# Patient Record
Sex: FEMALE | Race: WHITE | NOT HISPANIC OR LATINO | Employment: FULL TIME | URBAN - METROPOLITAN AREA
[De-identification: names, ages, dates, MRNs, and addresses within clinical notes are randomized per-mention and may not be internally consistent; named-entity substitution may affect disease eponyms.]

---

## 2017-05-04 ENCOUNTER — OFFICE VISIT (OUTPATIENT)
Dept: OBSTETRICS AND GYNECOLOGY | Facility: CLINIC | Age: 37
End: 2017-05-04
Payer: COMMERCIAL

## 2017-05-04 VITALS
BODY MASS INDEX: 19.49 KG/M2 | HEIGHT: 66 IN | DIASTOLIC BLOOD PRESSURE: 70 MMHG | SYSTOLIC BLOOD PRESSURE: 118 MMHG | WEIGHT: 121.25 LBS

## 2017-05-04 DIAGNOSIS — E28.39 PREMATURE OVARIAN FAILURE: ICD-10-CM

## 2017-05-04 DIAGNOSIS — Z01.419 WELL WOMAN EXAM WITH ROUTINE GYNECOLOGICAL EXAM: Primary | ICD-10-CM

## 2017-05-04 PROCEDURE — 99999 PR PBB SHADOW E&M-EST. PATIENT-LVL II: CPT | Mod: PBBFAC,,, | Performed by: OBSTETRICS & GYNECOLOGY

## 2017-05-04 PROCEDURE — 99385 PREV VISIT NEW AGE 18-39: CPT | Mod: S$GLB,,, | Performed by: OBSTETRICS & GYNECOLOGY

## 2017-05-04 RX ORDER — MEDROXYPROGESTERONE ACETATE 10 MG/1
10 TABLET ORAL DAILY
Qty: 14 TABLET | Refills: 11 | Status: SHIPPED | OUTPATIENT
Start: 2017-05-04 | End: 2017-05-15 | Stop reason: CLARIF

## 2017-05-04 RX ORDER — ESTRADIOL 0.1 MG/D
FILM, EXTENDED RELEASE TRANSDERMAL
Refills: 2 | COMMUNITY
Start: 2017-04-25 | End: 2017-05-04 | Stop reason: SDUPTHER

## 2017-05-04 RX ORDER — ESTRADIOL 0.1 MG/D
1 FILM, EXTENDED RELEASE TRANSDERMAL
Qty: 8 PATCH | Refills: 11 | Status: SHIPPED | OUTPATIENT
Start: 2017-05-04 | End: 2018-05-29 | Stop reason: SDUPTHER

## 2017-05-04 NOTE — MR AVS SNAPSHOT
"    [facility] [address]  [address] [facility]  [address]  [facility] [address]  Phone: [phone]  Fax: [phone]                  Ashli Michaels   2017 3:00 PM   Office Visit    Description:  Female : 1980   Provider:  Shanon Romo MD   Department:  [facility] [address]           Reason for Visit     Well Woman                To Do List           Goals (5 Years of Data)     None      [facility]     [facility] -  Assistance  Unless otherwise directed by your provider, please contact [facility], our nurse care line that is available for  assistance.     Registered nurses in the [facility] provide: appointment scheduling, clinical advisement, health education, and other advisory services.  Call: [phone] (toll free)               Medications           Message regarding Medications     Verify the changes and/or additions to your medication regime listed below are the same as discussed with your clinician today.  If any of these changes or additions are incorrect, please notify your healthcare provider.        STOP taking these medications     estradiol (VIVELLE-DOT) 0.025 mg/24 hr Place 1 patch onto the skin twice a week.           Verify that the below list of medications is an accurate representation of the medications you are currently taking.  If none reported, the list may be blank. If incorrect, please contact your healthcare provider. Carry this list with you in case of emergency.           Current Medications     estradiol (VIVELLE-DOT) 0.1 mg/24 hr PTSW APLY ONE PATCH TOPICALLY TWICE A WEEK           Clinical Reference Information           Your Vitals Were     BP Height Weight Last Period BMI    118/70 5' 6" (1.676 m) 55 kg (121 lb 4.1 oz) 2017 19.57 kg/m2      Blood Pressure          Most Recent Value    BP  118/70      Allergies as of 2017     No Known Allergies      Immunizations Administered on Date of " Encounter - 5/4/2017     None      Language Assistance Services     ATTENTION: Language assistance services are available, free of charge. Please call [phone].      ATENCIÓN: Si habla bernie, tiene a mckinnon disposición servicios gratuitos de asistencia lingüística. Llame al [phone].     CHÚ Ý: N?u b?n nói Ti?ng Vi?t, có các d?ch v? h? tr? ngôn ng? mi?n phí dành cho b?n. G?i s? [phone].         [facility] [address] complies with applicable Federal civil rights laws and does not discriminate on the basis of race, color, national origin, age, disability, or sex.

## 2017-05-04 NOTE — PROGRESS NOTES
History & Physical  Gynecology      SUBJECTIVE:     Chief Complaint: Well Woman       History of Present Illness:  Annual Exam-Premenopausal  Patient presents for annual exam. The patient has no complaints today. Menstrual cycles are once a month with hormones- patient has POF and is taking estogen and progesterone.  The patient is sexually active. GYN screening history: last pap: approximate date  and was normal. The patient participates in regular exercise: yes.  The patient does not smoke.      Contraception: none    FH:  Breast cancer: none  Colon cancer: grandmother (death at 66)and great grandmother  Ovarian cancer: none    Review of patient's allergies indicates:  No Known Allergies    Past Medical History:   Diagnosis Date    Premature ovarian failure      No past surgical history on file.  OB History      Para Term  AB TAB SAB Ectopic Multiple Living    0 0 0 0 0 0 0 0 0 0        Family History   Problem Relation Age of Onset    Colon cancer Neg Hx     Hypertension Neg Hx      labor Neg Hx      Social History   Substance Use Topics    Smoking status: Never Smoker    Smokeless tobacco: None    Alcohol use Yes       Current Outpatient Prescriptions   Medication Sig    estradiol (VIVELLE-DOT) 0.1 mg/24 hr PTSW Place 1 patch onto the skin twice a week.    medroxyPROGESTERone (PROVERA) 10 MG tablet Take 1 tablet (10 mg total) by mouth once daily.     No current facility-administered medications for this visit.          Review of Systems:  Review of Systems   Constitutional: Negative for activity change, appetite change and fever.   Respiratory: Negative for shortness of breath.    Cardiovascular: Negative for chest pain.   Gastrointestinal: Negative for abdominal pain, constipation, diarrhea, nausea and vomiting.   Genitourinary: Negative for menorrhagia, menstrual problem, pelvic pain, vaginal bleeding, vaginal discharge and vaginal pain.   Neurological: Negative for numbness  and headaches.   Breast: Negative for breast pain and nipple discharge       OBJECTIVE:     Physical Exam:  Physical Exam   Constitutional: She is oriented to person, place, and time. She appears well-developed and well-nourished.   Neck: Normal range of motion. Neck supple. No tracheal deviation present. No thyromegaly present.   Cardiovascular: Normal rate, regular rhythm and normal heart sounds.    Pulmonary/Chest: Effort normal and breath sounds normal. Right breast exhibits no inverted nipple, no mass, no nipple discharge, no skin change and no tenderness. Left breast exhibits no inverted nipple, no mass, no nipple discharge, no skin change and no tenderness. Breasts are symmetrical.   Abdominal: Soft.   Genitourinary: Vagina normal and uterus normal. No labial fusion. There is no rash, tenderness, lesion or injury on the right labia. There is no rash, tenderness, lesion or injury on the left labia. Uterus is not deviated, not enlarged, not fixed and not tender. Cervix exhibits no motion tenderness, no discharge and no friability. Right adnexum displays no mass, no tenderness and no fullness. Left adnexum displays no mass, no tenderness and no fullness. No erythema, tenderness or bleeding in the vagina. No foreign body in the vagina. No signs of injury around the vagina. No vaginal discharge found.   Neurological: She is alert and oriented to person, place, and time.   Psychiatric: She has a normal mood and affect. Her behavior is normal. Judgment and thought content normal.   Nursing note and vitals reviewed.        ASSESSMENT:       ICD-10-CM ICD-9-CM    1. Well woman exam with routine gynecological exam Z01.419 V72.31    2. Premature ovarian failure E28.8 256.8 estradiol (VIVELLE-DOT) 0.1 mg/24 hr PTSW      medroxyPROGESTERone (PROVERA) 10 MG tablet          Plan:      Ashli was seen today for well woman.    Diagnoses and all orders for this visit:    Well woman exam with routine gynecological  exam    Premature ovarian failure  -     estradiol (VIVELLE-DOT) 0.1 mg/24 hr PTSW; Place 1 patch onto the skin twice a week.  -     medroxyPROGESTERone (PROVERA) 10 MG tablet; Take 1 tablet (10 mg total) by mouth once daily.        No orders of the defined types were placed in this encounter.      Well Woman:  - Pap smear due one year  - Birth control: patient declines; estradiol and provera refilled  - GC/CT:not required  - Mammogram: due age 39  - Smoking cessation: n/a  - Labs: none required   - Vaccines: none required  - Calcium and Vitamin D counseling; Exercise counseling    POF:  - referral to [facility] for fertility counseling    Return in about 1 year (around 5/4/2018) for annual or prn.    Shanon Romo

## 2017-05-08 ENCOUNTER — PATIENT MESSAGE (OUTPATIENT)
Dept: OBSTETRICS AND GYNECOLOGY | Facility: CLINIC | Age: 37
End: 2017-05-08

## 2017-05-14 ENCOUNTER — PATIENT MESSAGE (OUTPATIENT)
Dept: OBSTETRICS AND GYNECOLOGY | Facility: CLINIC | Age: 37
End: 2017-05-14

## 2017-05-14 DIAGNOSIS — E28.39 PREMATURE OVARIAN FAILURE: Primary | ICD-10-CM

## 2017-05-15 ENCOUNTER — TELEPHONE (OUTPATIENT)
Dept: OBSTETRICS AND GYNECOLOGY | Facility: CLINIC | Age: 37
End: 2017-05-15

## 2017-05-15 NOTE — TELEPHONE ENCOUNTER
Called and scheduled patient for her mammogram for Friday morning. Patient verbalized understanding.

## 2017-05-16 RX ORDER — PROGESTERONE 200 MG/1
200 CAPSULE ORAL NIGHTLY
Qty: 12 CAPSULE | Refills: 11 | Status: SHIPPED | OUTPATIENT
Start: 2017-05-16 | End: 2019-01-17

## 2017-05-19 ENCOUNTER — HOSPITAL ENCOUNTER (OUTPATIENT)
Dept: RADIOLOGY | Facility: OTHER | Age: 37
Discharge: HOME | End: 2017-05-19
Attending: OBSTETRICS & GYNECOLOGY
Payer: COMMERCIAL

## 2017-05-19 DIAGNOSIS — E28.39 PREMATURE OVARIAN FAILURE: ICD-10-CM

## 2017-05-19 DIAGNOSIS — Z12.31 VISIT FOR SCREENING MAMMOGRAM: ICD-10-CM

## 2017-05-19 PROCEDURE — 77067 SCR MAMMO BI INCL CAD: CPT | Mod: 26,,, | Performed by: RADIOLOGY

## 2017-05-19 PROCEDURE — 77067 SCR MAMMO BI INCL CAD: CPT | Mod: TC

## 2017-05-19 PROCEDURE — 77063 BREAST TOMOSYNTHESIS BI: CPT | Mod: 26,,, | Performed by: RADIOLOGY

## 2018-02-15 ENCOUNTER — OFFICE VISIT (OUTPATIENT)
Dept: URGENT CARE | Facility: CLINIC | Age: 38
End: 2018-02-15
Payer: COMMERCIAL

## 2018-02-15 VITALS
TEMPERATURE: 97 F | WEIGHT: 125 LBS | HEIGHT: 66 IN | SYSTOLIC BLOOD PRESSURE: 123 MMHG | BODY MASS INDEX: 20.09 KG/M2 | HEART RATE: 81 BPM | OXYGEN SATURATION: 98 % | DIASTOLIC BLOOD PRESSURE: 72 MMHG | RESPIRATION RATE: 19 BRPM

## 2018-02-15 DIAGNOSIS — J06.9 UPPER RESPIRATORY TRACT INFECTION, UNSPECIFIED TYPE: Primary | ICD-10-CM

## 2018-02-15 PROCEDURE — 3008F BODY MASS INDEX DOCD: CPT | Mod: S$GLB,,, | Performed by: FAMILY MEDICINE

## 2018-02-15 PROCEDURE — 99214 OFFICE O/P EST MOD 30 MIN: CPT | Mod: S$GLB,,, | Performed by: FAMILY MEDICINE

## 2018-02-15 RX ORDER — BENZONATATE 100 MG/1
100 CAPSULE ORAL EVERY 6 HOURS PRN
Qty: 30 CAPSULE | Refills: 1 | Status: SHIPPED | OUTPATIENT
Start: 2018-02-15 | End: 2019-01-17

## 2018-02-15 RX ORDER — CODEINE PHOSPHATE AND GUAIFENESIN 10; 100 MG/5ML; MG/5ML
5 SOLUTION ORAL 3 TIMES DAILY PRN
Qty: 60 ML | Refills: 0 | Status: SHIPPED | OUTPATIENT
Start: 2018-02-15 | End: 2018-02-25

## 2018-02-15 NOTE — PROGRESS NOTES
"Subjective:       Patient ID: Ashli Michaels is a 37 y.o. female.    Vitals:  height is 5' 6" (1.676 m) and weight is 56.7 kg (125 lb). Her tympanic temperature is 97.1 °F (36.2 °C). Her blood pressure is 123/72 and her pulse is 81. Her respiration is 19 and oxygen saturation is 98%.     Chief Complaint: Cough    Patient with cough x 5 days. Cough started on Saturday. Patient stating she developed a lot of chest congestion. Patient with difficulty breathing. Cough has not been productive until today.       Cough   This is a new problem. Episode onset: 5 days. The problem has been unchanged. The problem occurs every few minutes. Associated symptoms include headaches and shortness of breath. Pertinent negatives include no chest pain, chills, ear pain, eye redness, fever, myalgias, sore throat or wheezing. The symptoms are aggravated by lying down. Treatments tried: Mucinex/Nyquil. The treatment provided moderate relief.     Review of Systems   Constitution: Positive for malaise/fatigue. Negative for chills and fever.   HENT: Positive for hoarse voice. Negative for congestion, ear pain and sore throat.    Eyes: Negative for discharge and redness.   Cardiovascular: Negative for chest pain, dyspnea on exertion and leg swelling.   Respiratory: Positive for cough and shortness of breath. Negative for sputum production and wheezing.    Musculoskeletal: Negative for myalgias.   Gastrointestinal: Negative for abdominal pain and nausea.   Neurological: Positive for headaches.       Objective:      Physical Exam   Constitutional: She appears well-developed and well-nourished.   HENT:   Head: Normocephalic and atraumatic.   Mouth/Throat: Posterior oropharyngeal erythema present. No oropharyngeal exudate.   Eyes: EOM are normal. Pupils are equal, round, and reactive to light.   Neck: Normal range of motion. Neck supple.   Cardiovascular: Normal rate, regular rhythm and normal heart sounds.    Pulmonary/Chest: Effort normal and " breath sounds normal.   Nursing note and vitals reviewed.      Assessment:       1. Upper respiratory tract infection, unspecified type        Plan:         Upper respiratory tract infection, unspecified type  -     benzonatate (TESSALON PERLES) 100 MG capsule; Take 1 capsule (100 mg total) by mouth every 6 (six) hours as needed for Cough.  Dispense: 30 capsule; Refill: 1  -     guaifenesin-codeine 100-10 mg/5 ml (CHERATUSSIN AC)  mg/5 mL syrup; Take 5 mLs by mouth 3 (three) times daily as needed for Cough (MAINLY FOR NIGHT-TIME USE).  Dispense: 60 mL; Refill: 0

## 2018-02-15 NOTE — PATIENT INSTRUCTIONS
Viral Upper Respiratory Illness (Adult)  You have a viral upper respiratory illness (URI), which is another term for the common cold. This illness is contagious during the first few days. It is spread through the air by coughing and sneezing. It may also be spread by direct contact (touching the sick person and then touching your own eyes, nose, or mouth). Frequent handwashing will decrease risk of spread. Most viral illnesses go away within 7 to 10 days with rest and simple home remedies. Sometimes the illness may last for several weeks. Antibiotics will not kill a virus, and they are generally not prescribed for this condition.    Home care  · If symptoms are severe, rest at home for the first 2 to 3 days. When you resume activity, don't let yourself get too tired.  · Avoid being exposed to cigarette smoke (yours or others).  · You may use acetaminophen or ibuprofen to control pain and fever, unless another medicine was prescribed. (Note: If you have chronic liver or kidney disease, have ever had a stomach ulcer or gastrointestinal bleeding, or are taking blood-thinning medicines, talk with your healthcare provider before using these medicines.) Aspirin should never be given to anyone under 18 years of age who is ill with a viral infection or fever. It may cause severe liver or brain damage.  · Your appetite may be poor, so a light diet is fine. Avoid dehydration by drinking 6 to 8 glasses of fluids per day (water, soft drinks, juices, tea, or soup). Extra fluids will help loosen secretions in the nose and lungs.  · Over-the-counter cold medicines will not shorten the length of time youre sick, but they may be helpful for the following symptoms: cough, sore throat, and nasal and sinus congestion. (Note: Do not use decongestants if you have high blood pressure.)  Follow-up care  Follow up with your healthcare provider, or as advised.  When to seek medical advice  Call your healthcare provider right away if any  of these occur:  · Cough with lots of colored sputum (mucus)  · Severe headache; face, neck, or ear pain  · Difficulty swallowing due to throat pain  · Fever of 100.4°F (38°C)  Call 911, or get immediate medical care  Call emergency services right away if any of these occur:  · Chest pain, shortness of breath, wheezing, or difficulty breathing  · Coughing up blood  · Inability to swallow due to throat pain  Date Last Reviewed: 9/13/2015  © 2820-6649 [employer]. [address] [place] [address]. All rights reserved. This information is not intended as a substitute for professional medical care. Always follow your healthcare professional's instructions.

## 2018-05-29 ENCOUNTER — PATIENT MESSAGE (OUTPATIENT)
Dept: OBSTETRICS AND GYNECOLOGY | Facility: CLINIC | Age: 38
End: 2018-05-29

## 2018-05-29 DIAGNOSIS — E28.39 PREMATURE OVARIAN FAILURE: ICD-10-CM

## 2018-05-29 NOTE — TELEPHONE ENCOUNTER
----- Message from Aliyah Caballero sent at 5/29/2018  4:25 PM CDT -----  Contact: marcella  from [facility]   Pt is requesting a refill on the prescription estradiol (VIVELLE-DOT). Pt can be reached at [phone]. Marcella from [facility] can be reached at [phone].

## 2018-05-30 RX ORDER — ESTRADIOL 0.1 MG/D
1 FILM, EXTENDED RELEASE TRANSDERMAL
Qty: 8 PATCH | Refills: 0 | Status: SHIPPED | OUTPATIENT
Start: 2018-05-31 | End: 2019-01-17

## 2018-11-14 DIAGNOSIS — E28.39 PREMATURE OVARIAN INSUFFICIENCY: Primary | ICD-10-CM

## 2018-11-14 DIAGNOSIS — Z00.00 ROUTINE HEALTH MAINTENANCE: ICD-10-CM

## 2018-12-06 ENCOUNTER — TELEPHONE (OUTPATIENT)
Dept: OBSTETRICS AND GYNECOLOGY | Facility: CLINIC | Age: 38
End: 2018-12-06

## 2018-12-06 DIAGNOSIS — Z30.9 ENCOUNTER FOR CONTRACEPTIVE MANAGEMENT, UNSPECIFIED TYPE: Primary | ICD-10-CM

## 2018-12-06 NOTE — TELEPHONE ENCOUNTER
----- Message from Shanon Romo MD sent at 12/5/2018  9:57 PM CST -----  Hey,   This patient is coming in January and it looks like the fertility doctor wants her to have a mirena placed.  Would you call her and find out if this is what she wants so that we can get paperwork done/mirena ordered before her appointment?  Thanks!  Kiley    ----- Message -----  From: Lindsay Calloway MD  Sent: 11/14/2018   2:40 PM  To: Shanon Romo MD    Hi Shanon, I saw one of our patients today. She is 38 with POI. She reported struggling with HRT because she forgets to take her P4 and then ends up with irregular bleeding. I advised her to consider a Mirena with continuous vivelle patches. Her current dose is 0.1mg/day. She is seeing you in January. Would you be comfortable placing it for her? I also ordered her mammogram and a baseline DEXA.   Thanks!  Lindsay

## 2018-12-12 ENCOUNTER — TELEPHONE (OUTPATIENT)
Dept: PHARMACY | Facility: CLINIC | Age: 38
End: 2018-12-12

## 2018-12-12 NOTE — TELEPHONE ENCOUNTER
LVM to notify pt MIRENA is covered with $0.00 copay on pharmacy benefit. Need to offer pharmacist consultation and obtain consent to deliver to [facility]. Will follow up if no return call is received.

## 2018-12-17 NOTE — TELEPHONE ENCOUNTER
Pt returned call regarding IUD. HIPAA verified. Explained that she has a $0.00 copay for MIRENA on her pharmacy benefit but she may have a copay for her office visit. She declined pharmacist consultation. She has an appointment 01/10/18 at 8:30 am for IUD placement. Will reach out to staff to confirm delivery with [facility].

## 2019-01-17 ENCOUNTER — OFFICE VISIT (OUTPATIENT)
Dept: OBSTETRICS AND GYNECOLOGY | Facility: CLINIC | Age: 39
End: 2019-01-17
Payer: COMMERCIAL

## 2019-01-17 ENCOUNTER — PATIENT MESSAGE (OUTPATIENT)
Dept: OBSTETRICS AND GYNECOLOGY | Facility: CLINIC | Age: 39
End: 2019-01-17

## 2019-01-17 VITALS
DIASTOLIC BLOOD PRESSURE: 80 MMHG | SYSTOLIC BLOOD PRESSURE: 120 MMHG | BODY MASS INDEX: 21.11 KG/M2 | WEIGHT: 131.38 LBS | HEIGHT: 66 IN

## 2019-01-17 DIAGNOSIS — Z01.419 WELL WOMAN EXAM WITH ROUTINE GYNECOLOGICAL EXAM: Primary | ICD-10-CM

## 2019-01-17 DIAGNOSIS — E28.39 PREMATURE OVARIAN FAILURE: ICD-10-CM

## 2019-01-17 PROCEDURE — 58300 INSERTION OF IUD: ICD-10-PCS | Mod: S$GLB,,, | Performed by: OBSTETRICS & GYNECOLOGY

## 2019-01-17 PROCEDURE — 87624 HPV HI-RISK TYP POOLED RSLT: CPT

## 2019-01-17 PROCEDURE — 88175 CYTOPATH C/V AUTO FLUID REDO: CPT

## 2019-01-17 PROCEDURE — 99395 PR PREVENTIVE VISIT,EST,18-39: ICD-10-PCS | Mod: 25,S$GLB,, | Performed by: OBSTETRICS & GYNECOLOGY

## 2019-01-17 PROCEDURE — 99395 PREV VISIT EST AGE 18-39: CPT | Mod: 25,S$GLB,, | Performed by: OBSTETRICS & GYNECOLOGY

## 2019-01-17 PROCEDURE — 58300 INSERT INTRAUTERINE DEVICE: CPT | Mod: S$GLB,,, | Performed by: OBSTETRICS & GYNECOLOGY

## 2019-01-17 RX ORDER — ESTRADIOL 0.1 MG/D
1 FILM, EXTENDED RELEASE TRANSDERMAL
Qty: 8 PATCH | Refills: 11 | Status: SHIPPED | OUTPATIENT
Start: 2019-01-17 | End: 2020-01-28 | Stop reason: SDUPTHER

## 2019-01-17 NOTE — PROCEDURES
Insertion of IUD  Date/Time: 1/17/2019 11:09 AM  Performed by: Shanon Romo MD  Authorized by: Shanon Romo MD     Consent:     Consent obtained:  Written    Consent given by:  Patient    Procedure risks and benefits discussed: yes      Patient questions answered: yes      Patient agrees, verbalizes understanding, and wants to proceed: yes      Educational handouts given: yes      Instructions and paperwork completed: yes    Procedure:     Pelvic exam performed: yes      Negative urine pregnancy test: yes      Cervix cleaned and prepped: yes      Speculum placed in vagina: yes      Tenaculum applied to cervix: yes      Uterus sounded: yes      Uterus sound depth (cm):  6    IUD inserted with no complications: yes      IUD type:  Mirena    Strings trimmed: yes    Post-procedure:     Patient tolerated procedure well: yes      Patient will follow up after next period: yes

## 2019-01-17 NOTE — PROGRESS NOTES
History & Physical  Gynecology      SUBJECTIVE:     Chief Complaint: Well Woman       History of Present Illness:  Annual Exam-Premenopausal  Patient presents for annual exam. The patient has no complaints today. Menstrual cycles are absent- premature ovarian failure.  The patient is sexually active. GYN screening history: last pap: approximate date  and was normal. The patient participates in regular exercise: yes.  The patient does not smoke.      Contraception: none required    FH:  Breast cancer: none  Colon cancer: grandmother/great grandmother  Ovarian cancer: none    Review of patient's allergies indicates:   Allergen Reactions    Erythromycin Other (See Comments)     As a child; hives       Past Medical History:   Diagnosis Date    Premature ovarian failure      History reviewed. No pertinent surgical history.  OB History      Para Term  AB Living    0 0 0 0 0 0    SAB TAB Ectopic Multiple Live Births    0 0 0 0          Family History   Problem Relation Age of Onset    Colon cancer Neg Hx     Hypertension Neg Hx      labor Neg Hx      Social History     Tobacco Use    Smoking status: Never Smoker    Smokeless tobacco: Never Used   Substance Use Topics    Alcohol use: Yes    Drug use: No       Current Outpatient Medications   Medication Sig    estradiol (VIVELLE-DOT) 0.1 mg/24 hr PTSW Place 1 patch onto the skin twice a week.    levonorgestrel (MIRENA) 20 mcg/24 hr (5 years) IUD 1 Intra Uterine Device by Intrauterine route once. for 1 dose     No current facility-administered medications for this visit.          Review of Systems:  Review of Systems   Constitutional: Negative for activity change, appetite change and fever.   Respiratory: Negative for shortness of breath.    Cardiovascular: Negative for chest pain.   Gastrointestinal: Negative for abdominal pain, constipation, diarrhea, nausea and vomiting.   Genitourinary: Negative for menorrhagia, menstrual problem,  pelvic pain, vaginal bleeding, vaginal discharge and vaginal pain.   Neurological: Negative for numbness and headaches.   Breast: Negative for mastodynia and nipple discharge       OBJECTIVE:     Physical Exam:  Physical Exam   Constitutional: She is oriented to person, place, and time. She appears well-developed and well-nourished.   Neck: Normal range of motion. Neck supple. No tracheal deviation present. No thyromegaly present.   Cardiovascular: Normal rate, regular rhythm and normal heart sounds.   Pulmonary/Chest: Effort normal and breath sounds normal. Right breast exhibits no inverted nipple, no mass, no nipple discharge, no skin change and no tenderness. Left breast exhibits no inverted nipple, no mass, no nipple discharge, no skin change and no tenderness. Breasts are symmetrical.   Abdominal: Soft.   Genitourinary: Vagina normal and uterus normal. No labial fusion. There is no rash, tenderness, lesion or injury on the right labia. There is no rash, tenderness, lesion or injury on the left labia. Uterus is not deviated, not enlarged, not fixed and not tender. Cervix exhibits no motion tenderness, no discharge and no friability. Right adnexum displays no mass, no tenderness and no fullness. Left adnexum displays no mass, no tenderness and no fullness. No erythema, tenderness or bleeding in the vagina. No foreign body in the vagina. No signs of injury around the vagina. No vaginal discharge found.   Genitourinary Comments: Urethra: normal appearing urethra with no masses, tenderness or lesions  Urethral meatus: normal size, anterior vaginal wall with no prolapse, no lesions   Neurological: She is alert and oriented to person, place, and time.   Psychiatric: She has a normal mood and affect. Her behavior is normal. Judgment and thought content normal.   Nursing note and vitals reviewed.      Chaperoned by: Samia    ASSESSMENT:       ICD-10-CM ICD-9-CM    1. Well woman exam with routine gynecological exam  Z01.419 V72.31 Liquid-based pap smear, screening      HPV High Risk Genotypes, PCR      Mammo Digital Screening Bilat w/ Bernabe   2. Premature ovarian failure E28.8 256.8 estradiol (VIVELLE-DOT) 0.1 mg/24 hr PTSW          Plan:      Ashli was seen today for well woman.    Diagnoses and all orders for this visit:    Well woman exam with routine gynecological exam  -     Liquid-based pap smear, screening  -     HPV High Risk Genotypes, PCR  -     Mammo Digital Screening Bilat w/ Bernabe; Future    Premature ovarian failure  -     estradiol (VIVELLE-DOT) 0.1 mg/24 hr PTSW; Place 1 patch onto the skin twice a week.        Orders Placed This Encounter   Procedures    HPV High Risk Genotypes, PCR    Mammo Digital Screening Bilat w/ Bernabe       Well Woman:  - Pap smear done today with hpv  - Birth control: none required, POI; mirena placed today and vivelle dot refilled  - GC/CT:n/a  - Mammogram: ordered for March  - Smoking cessation: n/a  - Labs: none required   - Vaccines: none required  - Exercise counseling    POI:  - mirena today, refilled vivelle dot  - patient will be having DXA scan    Follow up in one year for annual or prn.    Shanon Romo

## 2019-01-22 LAB
HPV HR 12 DNA CVX QL NAA+PROBE: NEGATIVE
HPV16 AG SPEC QL: NEGATIVE
HPV18 DNA SPEC QL NAA+PROBE: NEGATIVE

## 2019-02-14 ENCOUNTER — OFFICE VISIT (OUTPATIENT)
Dept: OBSTETRICS AND GYNECOLOGY | Facility: CLINIC | Age: 39
End: 2019-02-14
Payer: COMMERCIAL

## 2019-02-14 VITALS
WEIGHT: 130 LBS | DIASTOLIC BLOOD PRESSURE: 70 MMHG | SYSTOLIC BLOOD PRESSURE: 120 MMHG | BODY MASS INDEX: 20.89 KG/M2 | HEIGHT: 66 IN

## 2019-02-14 DIAGNOSIS — Z30.431 IUD CHECK UP: Primary | ICD-10-CM

## 2019-02-14 PROCEDURE — 3008F BODY MASS INDEX DOCD: CPT | Mod: CPTII,S$GLB,, | Performed by: OBSTETRICS & GYNECOLOGY

## 2019-02-14 PROCEDURE — 99213 OFFICE O/P EST LOW 20 MIN: CPT | Mod: S$GLB,,, | Performed by: OBSTETRICS & GYNECOLOGY

## 2019-02-14 PROCEDURE — 99213 PR OFFICE/OUTPT VISIT, EST, LEVL III, 20-29 MIN: ICD-10-PCS | Mod: S$GLB,,, | Performed by: OBSTETRICS & GYNECOLOGY

## 2019-02-14 PROCEDURE — 3008F PR BODY MASS INDEX (BMI) DOCUMENTED: ICD-10-PCS | Mod: CPTII,S$GLB,, | Performed by: OBSTETRICS & GYNECOLOGY

## 2019-06-17 ENCOUNTER — PATIENT MESSAGE (OUTPATIENT)
Dept: OBSTETRICS AND GYNECOLOGY | Facility: CLINIC | Age: 39
End: 2019-06-17

## 2019-07-10 ENCOUNTER — HOSPITAL ENCOUNTER (OUTPATIENT)
Dept: RADIOLOGY | Facility: OTHER | Age: 39
Discharge: HOME | End: 2019-07-10
Attending: OBSTETRICS & GYNECOLOGY

## 2019-07-16 DIAGNOSIS — Z12.39 SCREENING BREAST EXAMINATION: Primary | ICD-10-CM

## 2019-08-13 ENCOUNTER — OFFICE VISIT (OUTPATIENT)
Dept: OBSTETRICS AND GYNECOLOGY | Facility: CLINIC | Age: 39
End: 2019-08-13
Payer: COMMERCIAL

## 2019-08-13 VITALS
DIASTOLIC BLOOD PRESSURE: 64 MMHG | BODY MASS INDEX: 22.18 KG/M2 | SYSTOLIC BLOOD PRESSURE: 112 MMHG | HEIGHT: 66 IN | WEIGHT: 138 LBS

## 2019-08-13 DIAGNOSIS — T83.32XA INTRAUTERINE CONTRACEPTIVE DEVICE THREADS LOST, INITIAL ENCOUNTER: Primary | ICD-10-CM

## 2019-08-13 PROCEDURE — 99213 PR OFFICE/OUTPT VISIT, EST, LEVL III, 20-29 MIN: ICD-10-PCS | Mod: S$GLB,,, | Performed by: OBSTETRICS & GYNECOLOGY

## 2019-08-13 PROCEDURE — 99999 PR PBB SHADOW E&M-EST. PATIENT-LVL III: CPT | Mod: PBBFAC,,, | Performed by: OBSTETRICS & GYNECOLOGY

## 2019-08-13 PROCEDURE — 3008F BODY MASS INDEX DOCD: CPT | Mod: CPTII,S$GLB,, | Performed by: OBSTETRICS & GYNECOLOGY

## 2019-08-13 PROCEDURE — 3008F PR BODY MASS INDEX (BMI) DOCUMENTED: ICD-10-PCS | Mod: CPTII,S$GLB,, | Performed by: OBSTETRICS & GYNECOLOGY

## 2019-08-13 PROCEDURE — 99213 OFFICE O/P EST LOW 20 MIN: CPT | Mod: S$GLB,,, | Performed by: OBSTETRICS & GYNECOLOGY

## 2019-08-13 PROCEDURE — 99999 PR PBB SHADOW E&M-EST. PATIENT-LVL III: ICD-10-PCS | Mod: PBBFAC,,, | Performed by: OBSTETRICS & GYNECOLOGY

## 2019-08-13 NOTE — PROGRESS NOTES
Gynecology    SUBJECTIVE:     Chief Complaint: IUD Check (cramping in lower back)       History of Present Illness:  39 year old who presents for IUD check up.  Reports that she has been having some new onset left sided cramping as well as some back cramping.  It has slowly improved over time, but is still present.  Describes it as a pinching sensation.  She has no bleeding, but does not have bleeding even when the IUD is not present.    Review of Systems:  Review of Systems   Genitourinary: Positive for pelvic pain. Negative for menorrhagia, menstrual problem, vaginal bleeding, vaginal discharge, vaginal pain and vaginal odor.        OBJECTIVE:     Physical Exam:  Physical Exam   Constitutional: She is oriented to person, place, and time. She appears well-developed and well-nourished.   Pulmonary/Chest: Effort normal.   Abdominal: Soft.   Genitourinary: Vagina normal and uterus normal. No labial fusion. There is no rash, tenderness, lesion or injury on the right labia. There is no rash, tenderness, lesion or injury on the left labia. Uterus is not deviated, not enlarged, not fixed and not tender. Cervix exhibits no motion tenderness, no discharge and no friability. Right adnexum displays no mass, no tenderness and no fullness. Left adnexum displays no mass, no tenderness and no fullness. No erythema, tenderness or bleeding in the vagina. No foreign body in the vagina. No signs of injury around the vagina. No vaginal discharge found.   Genitourinary Comments: Urethra: normal appearing urethra with no masses, tenderness or lesions  Urethral meatus: normal size, anterior vaginal wall with no prolapse, no lesions  Strings NOT present on exam   Neurological: She is alert and oriented to person, place, and time.   Psychiatric: She has a normal mood and affect. Her behavior is normal. Judgment and thought content normal.   Nursing note and vitals reviewed.         ASSESSMENT:       ICD-10-CM ICD-9-CM    1. Intrauterine  contraceptive device threads lost, initial encounter T83.32XA 996.32 US Pelvis Limited_IUD Placement or Check          Plan:      Ashli was seen today for iud check.    Diagnoses and all orders for this visit:    Intrauterine contraceptive device threads lost, initial encounter  -     US Pelvis Limited_IUD Placement or Check; Future    - Pelvic ultrasound ordered for IUD placement    Orders Placed This Encounter   Procedures    US Pelvis Limited_IUD Placement or Check       Follow up for annual or prn.    Shanon Romo

## 2019-08-15 ENCOUNTER — HOSPITAL ENCOUNTER (OUTPATIENT)
Dept: RADIOLOGY | Facility: OTHER | Age: 39
Discharge: HOME | End: 2019-08-15
Attending: OBSTETRICS & GYNECOLOGY
Payer: COMMERCIAL

## 2019-08-15 DIAGNOSIS — T83.32XA INTRAUTERINE CONTRACEPTIVE DEVICE THREADS LOST, INITIAL ENCOUNTER: ICD-10-CM

## 2019-08-15 PROCEDURE — 76830 TRANSVAGINAL US NON-OB: CPT | Mod: TC

## 2019-08-15 PROCEDURE — 76830 TRANSVAGINAL US NON-OB: CPT | Mod: 26,,, | Performed by: RADIOLOGY

## 2019-08-15 PROCEDURE — 76856 US EXAM PELVIC COMPLETE: CPT | Mod: 26,,, | Performed by: RADIOLOGY

## 2019-08-15 PROCEDURE — 76830 US PELVIS COMP WITH TRANSVAG NON-OB (XPD): ICD-10-PCS | Mod: 26,,, | Performed by: RADIOLOGY

## 2019-08-15 PROCEDURE — 76856 US PELVIS COMP WITH TRANSVAG NON-OB (XPD): ICD-10-PCS | Mod: 26,,, | Performed by: RADIOLOGY

## 2019-08-16 ENCOUNTER — HOSPITAL ENCOUNTER (OUTPATIENT)
Dept: RADIOLOGY | Facility: OTHER | Age: 39
Discharge: HOME | End: 2019-08-16
Attending: OBSTETRICS & GYNECOLOGY
Payer: COMMERCIAL

## 2019-08-16 VITALS — WEIGHT: 138 LBS | BODY MASS INDEX: 22.18 KG/M2 | HEIGHT: 66 IN

## 2019-08-16 DIAGNOSIS — Z12.39 SCREENING BREAST EXAMINATION: ICD-10-CM

## 2019-08-16 PROCEDURE — 77067 SCR MAMMO BI INCL CAD: CPT | Mod: TC

## 2019-08-16 PROCEDURE — 77067 MAMMO DIGITAL SCREENING BILAT WITH TOMOSYNTHESIS_CAD: ICD-10-PCS | Mod: 26,,, | Performed by: INTERNAL MEDICINE

## 2019-08-16 PROCEDURE — 77067 SCR MAMMO BI INCL CAD: CPT | Mod: 26,,, | Performed by: INTERNAL MEDICINE

## 2019-08-16 PROCEDURE — 77063 MAMMO DIGITAL SCREENING BILAT WITH TOMOSYNTHESIS_CAD: ICD-10-PCS | Mod: 26,,, | Performed by: INTERNAL MEDICINE

## 2019-08-16 PROCEDURE — 77063 BREAST TOMOSYNTHESIS BI: CPT | Mod: 26,,, | Performed by: INTERNAL MEDICINE

## 2019-09-23 ENCOUNTER — HOSPITAL ENCOUNTER (OUTPATIENT)
Dept: RADIOLOGY | Facility: OTHER | Age: 39
Discharge: HOME | End: 2019-09-23
Attending: OBSTETRICS & GYNECOLOGY
Payer: COMMERCIAL

## 2019-09-23 DIAGNOSIS — E28.39 PREMATURE OVARIAN INSUFFICIENCY: ICD-10-CM

## 2019-09-23 PROCEDURE — 77080 DEXA BONE DENSITY SPINE HIP: ICD-10-PCS | Mod: 26,,, | Performed by: RADIOLOGY

## 2019-09-23 PROCEDURE — 77080 DXA BONE DENSITY AXIAL: CPT | Mod: 26,,, | Performed by: RADIOLOGY

## 2019-09-23 PROCEDURE — 77080 DXA BONE DENSITY AXIAL: CPT | Mod: TC

## 2020-01-28 DIAGNOSIS — E28.39 PREMATURE OVARIAN FAILURE: ICD-10-CM

## 2020-01-28 RX ORDER — ESTRADIOL 0.1 MG/D
1 FILM, EXTENDED RELEASE TRANSDERMAL
Qty: 8 PATCH | Refills: 6 | Status: SHIPPED | OUTPATIENT
Start: 2020-01-30 | End: 2020-12-04 | Stop reason: SDUPTHER

## 2020-02-05 ENCOUNTER — OFFICE VISIT (OUTPATIENT)
Dept: OBSTETRICS AND GYNECOLOGY | Facility: CLINIC | Age: 40
End: 2020-02-05
Payer: COMMERCIAL

## 2020-02-05 VITALS
WEIGHT: 130.06 LBS | HEIGHT: 66 IN | SYSTOLIC BLOOD PRESSURE: 100 MMHG | BODY MASS INDEX: 20.9 KG/M2 | DIASTOLIC BLOOD PRESSURE: 70 MMHG

## 2020-02-05 DIAGNOSIS — Z01.419 WELL WOMAN EXAM WITH ROUTINE GYNECOLOGICAL EXAM: Primary | ICD-10-CM

## 2020-02-05 DIAGNOSIS — Z12.39 SCREENING FOR BREAST CANCER: ICD-10-CM

## 2020-02-05 PROCEDURE — 99999 PR PBB SHADOW E&M-EST. PATIENT-LVL III: CPT | Mod: PBBFAC,,, | Performed by: OBSTETRICS & GYNECOLOGY

## 2020-02-05 PROCEDURE — 99396 PR PREVENTIVE VISIT,EST,40-64: ICD-10-PCS | Mod: S$GLB,,, | Performed by: OBSTETRICS & GYNECOLOGY

## 2020-02-05 PROCEDURE — 99396 PREV VISIT EST AGE 40-64: CPT | Mod: S$GLB,,, | Performed by: OBSTETRICS & GYNECOLOGY

## 2020-02-05 PROCEDURE — 99999 PR PBB SHADOW E&M-EST. PATIENT-LVL III: ICD-10-PCS | Mod: PBBFAC,,, | Performed by: OBSTETRICS & GYNECOLOGY

## 2020-02-05 NOTE — PROGRESS NOTES
History & Physical  Gynecology      SUBJECTIVE:     Chief Complaint: Gynecologic Exam (annual exam)       History of Present Illness:  Annual Exam-Premenopausal  Patient presents for annual exam. The patient has no complaints today. Menstrual cycles are absent- mirena.  The patient is sexually active. GYN screening history: last pap: approximate date 2019 pap/hpv and was normal. The patient participates in regular exercise: yes.  Smoking status:  not asked    Contraception: IUD    FH:  Breast cancer: none  Colon cancer: grandmother/great grandomther  Ovarian cancer: none  Primary peritoneal: maternal aunt    Review of patient's allergies indicates:   Allergen Reactions    Erythromycin Other (See Comments) and Rash     As a child; hives       Past Medical History:   Diagnosis Date    Premature ovarian failure      History reviewed. No pertinent surgical history.  OB History        0    Para   0    Term   0       0    AB   0    Living   0       SAB   0    TAB   0    Ectopic   0    Multiple   0    Live Births                   Family History   Problem Relation Age of Onset    Colon cancer Maternal Grandmother     Hypertension Neg Hx      labor Neg Hx      Social History     Tobacco Use    Smoking status: Never Smoker    Smokeless tobacco: Never Used   Substance Use Topics    Alcohol use: Yes    Drug use: No       Current Outpatient Medications   Medication Sig    estradiol (VIVELLE-DOT) 0.1 mg/24 hr PTSW Place 1 patch onto the skin twice a week.     Current Facility-Administered Medications   Medication    levonorgestrel 20 mcg/24 hr (5 years) IUD 1 Intra Uterine Device         Review of Systems:  Review of Systems   Constitutional: Negative for activity change, appetite change and fever.   Respiratory: Negative for shortness of breath.    Cardiovascular: Negative for chest pain.   Gastrointestinal: Negative for abdominal pain, constipation, diarrhea, nausea and vomiting.    Genitourinary: Negative for menorrhagia, menstrual problem, pelvic pain, vaginal bleeding, vaginal discharge and vaginal pain.   Integumentary:  Negative for nipple discharge.   Neurological: Negative for numbness and headaches.   Breast: Negative for mastodynia and nipple discharge       OBJECTIVE:     Physical Exam:  Physical Exam   Constitutional: She is oriented to person, place, and time. She appears well-developed and well-nourished.   Neck: Normal range of motion. Neck supple. No tracheal deviation present. No thyromegaly present.   Cardiovascular: Normal rate, regular rhythm and normal heart sounds.   Pulmonary/Chest: Effort normal and breath sounds normal. Right breast exhibits no inverted nipple, no mass, no nipple discharge, no skin change and no tenderness. Left breast exhibits no inverted nipple, no mass, no nipple discharge, no skin change and no tenderness. Breasts are symmetrical.   Abdominal: Soft.   Genitourinary: Vagina normal and uterus normal. No labial fusion. There is no rash, tenderness, lesion or injury on the right labia. There is no rash, tenderness, lesion or injury on the left labia. Uterus is not deviated, not enlarged, not fixed and not tender. Cervix exhibits no motion tenderness, no discharge and no friability. Right adnexum displays no mass, no tenderness and no fullness. Left adnexum displays no mass, no tenderness and no fullness. No erythema, tenderness or bleeding in the vagina. No foreign body in the vagina. No signs of injury around the vagina. No vaginal discharge found.   Genitourinary Comments: Urethra: normal appearing urethra with no masses, tenderness or lesions  Urethral meatus: normal size, anterior vaginal wall with no prolapse, no lesions   Neurological: She is alert and oriented to person, place, and time.   Psychiatric: She has a normal mood and affect. Her behavior is normal. Judgment and thought content normal.   Nursing note and vitals  reviewed.      Chaperoned by: Leonides    ASSESSMENT:       ICD-10-CM ICD-9-CM    1. Well woman exam with routine gynecological exam Z01.419 V72.31    2. Screening for breast cancer Z12.39 V76.10 Mammo Digital Screening Bilat w/ Bernabe          Plan:      Ashli was seen today for gynecologic exam.    Diagnoses and all orders for this visit:    Well woman exam with routine gynecological exam    Screening for breast cancer  -     Mammo Digital Screening Bilat w/ Bernabe; Future        Orders Placed This Encounter   Procedures    Mammo Digital Screening Bilat w/ Bernabe       Well Woman:  - Pap smear up to date  - Birth control: iud  - GC/CT:n/a  - Mammogram: ordered/due August  - Smoking cessation: n/a  - Labs: none required   - Vaccines: none required  - Exercise counseling      Follow up in one year for annual or prn.    Shanon Romo

## 2020-05-14 ENCOUNTER — OFFICE VISIT (OUTPATIENT)
Dept: ORTHOPEDICS | Facility: CLINIC | Age: 40
End: 2020-05-14
Payer: COMMERCIAL

## 2020-05-14 ENCOUNTER — HOSPITAL ENCOUNTER (OUTPATIENT)
Dept: RADIOLOGY | Facility: HOSPITAL | Age: 40
Discharge: HOME | End: 2020-05-14
Attending: ORTHOPAEDIC SURGERY
Payer: COMMERCIAL

## 2020-05-14 VITALS
RESPIRATION RATE: 18 BRPM | WEIGHT: 130 LBS | HEART RATE: 81 BPM | SYSTOLIC BLOOD PRESSURE: 95 MMHG | HEIGHT: 66 IN | DIASTOLIC BLOOD PRESSURE: 66 MMHG | BODY MASS INDEX: 20.89 KG/M2 | TEMPERATURE: 99 F

## 2020-05-14 DIAGNOSIS — M25.561 ACUTE PAIN OF RIGHT KNEE: ICD-10-CM

## 2020-05-14 DIAGNOSIS — M25.561 RIGHT KNEE PAIN, UNSPECIFIED CHRONICITY: Primary | ICD-10-CM

## 2020-05-14 DIAGNOSIS — M25.561 RIGHT KNEE PAIN, UNSPECIFIED CHRONICITY: ICD-10-CM

## 2020-05-14 PROCEDURE — 73560 X-RAY EXAM OF KNEE 1 OR 2: CPT | Mod: TC,LT

## 2020-05-14 PROCEDURE — 73560 XR KNEE ORTHO RIGHT: ICD-10-PCS | Mod: 26,LT,, | Performed by: RADIOLOGY

## 2020-05-14 PROCEDURE — 3008F BODY MASS INDEX DOCD: CPT | Mod: CPTII,S$GLB,, | Performed by: ORTHOPAEDIC SURGERY

## 2020-05-14 PROCEDURE — 99999 PR PBB SHADOW E&M-EST. PATIENT-LVL III: ICD-10-PCS | Mod: PBBFAC,,, | Performed by: ORTHOPAEDIC SURGERY

## 2020-05-14 PROCEDURE — 99203 OFFICE O/P NEW LOW 30 MIN: CPT | Mod: S$GLB,,, | Performed by: ORTHOPAEDIC SURGERY

## 2020-05-14 PROCEDURE — 99999 PR PBB SHADOW E&M-EST. PATIENT-LVL III: CPT | Mod: PBBFAC,,, | Performed by: ORTHOPAEDIC SURGERY

## 2020-05-14 PROCEDURE — 73560 X-RAY EXAM OF KNEE 1 OR 2: CPT | Mod: 26,LT,, | Performed by: RADIOLOGY

## 2020-05-14 PROCEDURE — 3008F PR BODY MASS INDEX (BMI) DOCUMENTED: ICD-10-PCS | Mod: CPTII,S$GLB,, | Performed by: ORTHOPAEDIC SURGERY

## 2020-05-14 PROCEDURE — 99203 PR OFFICE/OUTPT VISIT, NEW, LEVL III, 30-44 MIN: ICD-10-PCS | Mod: S$GLB,,, | Performed by: ORTHOPAEDIC SURGERY

## 2020-05-14 PROCEDURE — 73562 XR KNEE ORTHO RIGHT: ICD-10-PCS | Mod: 26,RT,, | Performed by: RADIOLOGY

## 2020-05-14 PROCEDURE — 73562 X-RAY EXAM OF KNEE 3: CPT | Mod: 26,RT,, | Performed by: RADIOLOGY

## 2020-05-14 NOTE — PROGRESS NOTES
CC:  Right knee pain - trampoline injury      HISTORY       HPI:  Ashli Michaels is a 40 y.o. female right knee injury 2 days ago while jumping on trampoline    2 day history of right knee pain.   4/10, dull achy throbbing all throughout knee.    Patient states she was jumping on trampoline with the kids when she landed awkwardly twisted her right knee and felt a pop.    Patient had immediate pain  Noted early swelling, initial difficulty bearing weight and with knee motion.  Patient states that over the next 2 days she had more difficulty flexing and extending the knee and developed some increased swelling.      Patient has been out ambulate, but feels the knee is unstable and has given out several times.  Patient taking Tylenol and ibuprofen for pain.      Patient denies previous injuries to this knee.  Patient states she runs and baseline for exercise.  Active with children.    No tobacco, diabetes, heart attack, stroke, blood clot, cancer, prior knee surgeries    ROS:  Constitutional: Denies fever/chills  Neurological: Denies numbness/tingling (any exceptions noted in orthopaedic exam)   Psychiatric/Behavioral: Denies change in normal mood  Eyes: Denies change in vision  Cardiovascular: Denies chest pain  Respiratory: Denies shortness of breath  Hematologic/Lymphatic: Denies easy bleeding/bruising   Skin: Denies new rash or skin lesions   Gastrointestinal: Denies nausea/vomitting/diarrhea, change in bowel habits, abdominal pain   Allergic/Immunologic: Denies adverse reactions to current medications  Musculoskeletal: see HPI    PAST MEDICAL HISTORY:   Past Medical History:   Diagnosis Date    Premature ovarian failure      PAST SURGICAL HISTORY: No past surgical history on file.  FAMILY HISTORY:   Family History   Problem Relation Age of Onset    Colon cancer Maternal Grandmother     Hypertension Neg Hx      labor Neg Hx      SOCIAL HISTORY:   Social History     Socioeconomic History    Marital  "status:      Spouse name: Not on file    Number of children: Not on file    Years of education: Not on file    Highest education level: Not on file   Occupational History    Not on file   Social Needs    Financial resource strain: Not on file    Food insecurity:     Worry: Not on file     Inability: Not on file    Transportation needs:     Medical: Not on file     Non-medical: Not on file   Tobacco Use    Smoking status: Never Smoker    Smokeless tobacco: Never Used   Substance and Sexual Activity    Alcohol use: Yes    Drug use: No    Sexual activity: Yes     Partners: Male     Birth control/protection: None   Lifestyle    Physical activity:     Days per week: Not on file     Minutes per session: Not on file    Stress: Not on file   Relationships    Social connections:     Talks on phone: Not on file     Gets together: Not on file     Attends Confucianism service: Not on file     Active member of club or organization: Not on file     Attends meetings of clubs or organizations: Not on file     Relationship status: Not on file   Other Topics Concern    Not on file   Social History Narrative    Not on file     MEDICATIONS:   Current Outpatient Medications:     estradiol (VIVELLE-DOT) 0.1 mg/24 hr PTSW, Place 1 patch onto the skin twice a week., Disp: 8 patch, Rfl: 6    Current Facility-Administered Medications:     levonorgestrel 20 mcg/24 hr (5 years) IUD 1 Intra Uterine Device, 1 Intra Uterine Device, Intrauterine, , Shanon Romo MD, 1 Intra Uterine Device at 01/17/19 1109  ALLERGIES:   Review of patient's allergies indicates:   Allergen Reactions    Erythromycin Other (See Comments) and Rash     As a child; hives         EXAM      VITAL SIGNS:   BP 95/66   Pulse 81   Temp 99 °F (37.2 °C) (Oral)   Resp 18   Ht 5' 6" (1.676 m)   Wt 59 kg (130 lb)   BMI 20.98 kg/m²       PE:  General:  no acute distress, appears stated age    Neuro: alert and oriented x3  Psych: normal " mood  Head: normocephalic, atraumatic.   Eyes: no scleral icterus  Mouth: moist mucous membranes  Cardiovascular: extremities warm and well perfused  Lungs: breathing comfortably, equal chest rise bilat  Skin: clean, dry, intact (any exceptions noted in below musculoskeletal exam)    Musculoskeletal:  RLE:  No gross deformities, wounds  Minimal knee effusion  No crepitus with knee range of motion  Knee range of motion 0-110 degrees flexion.  Some pain with end-stage flexion  No varus or valgus instability at full extension or 30° of flexion.  Positive anterior drawer, positive Lachman's  Positive pivot shift  Positive Kelton for pain  Patella tracks midline with no instability  Hip range of motion to 100° of flexion, internal rotation to 20° external rotation of 45°  Motor intact 5/5 hip flex, quad, Tib Ant, gastroc, EHL, FHL.  Sensation intact saphenous, sural, deep/superficial peroneal, tibial nerves  Palp DP/PT pulse, BCR    LLE:  No gross deformities, wounds  No crepitus, No TTP  Motor intact hip flex, quad, Tib Ant, gastroc, EHL, FHL.  Sensation intact saphenous, sural, deep/superficial peroneal, tibial nerves  Palp DP/PT pulse, BCR        XRAYS:  Right knee x-ray showing no fracture dislocation  (I independently reviewed and interpreted the above imaging)    MEDICAL DECISION MAKING       Encounter Diagnosis   Name Primary?    Right knee pain, unspecified chronicity Yes       Ashli Michaels is a 40 y.o. female 2 day history of right knee pain.  Exam concerning for possible lateral meniscus tear and possible ACL tear.    Counseled patient and medical diagnosis and treatment options    --MRI right knee to evaluate for possible soft tissue pathology of the right knee      --RICE, NSAIDs p.r.n.  --weight bearing:  As tolerated  --followup:  [facility]ral [facility]  --XR at next visit:  None  --discussed possibility of surgical and nonsurgical management pending MRI  results      =====================  Tonny Goodson MD  Orthopaedic Surgery

## 2020-05-15 ENCOUNTER — HOSPITAL ENCOUNTER (OUTPATIENT)
Dept: RADIOLOGY | Facility: HOSPITAL | Age: 40
Discharge: HOME | End: 2020-05-15
Attending: STUDENT IN AN ORGANIZED HEALTH CARE EDUCATION/TRAINING PROGRAM
Payer: COMMERCIAL

## 2020-05-15 DIAGNOSIS — M25.561 RIGHT KNEE PAIN, UNSPECIFIED CHRONICITY: ICD-10-CM

## 2020-05-15 PROCEDURE — 73721 MRI JNT OF LWR EXTRE W/O DYE: CPT | Mod: 26,RT,, | Performed by: RADIOLOGY

## 2020-05-15 PROCEDURE — 73721 MRI KNEE WITHOUT CONTRAST RIGHT: ICD-10-PCS | Mod: 26,RT,, | Performed by: RADIOLOGY

## 2020-05-15 PROCEDURE — 73721 MRI JNT OF LWR EXTRE W/O DYE: CPT | Mod: TC,RT

## 2020-05-18 ENCOUNTER — HOSPITAL ENCOUNTER (OUTPATIENT)
Dept: RADIOLOGY | Facility: HOSPITAL | Age: 40
Discharge: HOME | End: 2020-05-18
Attending: ORTHOPAEDIC SURGERY
Payer: COMMERCIAL

## 2020-05-18 ENCOUNTER — OFFICE VISIT (OUTPATIENT)
Dept: SPORTS MEDICINE | Facility: CLINIC | Age: 40
End: 2020-05-18
Payer: COMMERCIAL

## 2020-05-18 ENCOUNTER — PATIENT MESSAGE (OUTPATIENT)
Dept: SPORTS MEDICINE | Facility: CLINIC | Age: 40
End: 2020-05-18

## 2020-05-18 VITALS
HEART RATE: 91 BPM | HEIGHT: 66 IN | WEIGHT: 130 LBS | SYSTOLIC BLOOD PRESSURE: 117 MMHG | BODY MASS INDEX: 20.89 KG/M2 | DIASTOLIC BLOOD PRESSURE: 78 MMHG

## 2020-05-18 DIAGNOSIS — S83.241A ACUTE MEDIAL MENISCUS TEAR OF RIGHT KNEE, INITIAL ENCOUNTER: ICD-10-CM

## 2020-05-18 DIAGNOSIS — S83.511A RUPTURE OF ANTERIOR CRUCIATE LIGAMENT OF RIGHT KNEE, INITIAL ENCOUNTER: Primary | ICD-10-CM

## 2020-05-18 DIAGNOSIS — M25.561 RIGHT KNEE PAIN, UNSPECIFIED CHRONICITY: ICD-10-CM

## 2020-05-18 PROCEDURE — 73562 X-RAY EXAM OF KNEE 3: CPT | Mod: TC,LT

## 2020-05-18 PROCEDURE — 99999 PR PBB SHADOW E&M-EST. PATIENT-LVL III: CPT | Mod: PBBFAC,,, | Performed by: ORTHOPAEDIC SURGERY

## 2020-05-18 PROCEDURE — 3008F BODY MASS INDEX DOCD: CPT | Mod: CPTII,S$GLB,, | Performed by: ORTHOPAEDIC SURGERY

## 2020-05-18 PROCEDURE — 99999 PR PBB SHADOW E&M-EST. PATIENT-LVL III: ICD-10-PCS | Mod: PBBFAC,,, | Performed by: ORTHOPAEDIC SURGERY

## 2020-05-18 PROCEDURE — 3008F PR BODY MASS INDEX (BMI) DOCUMENTED: ICD-10-PCS | Mod: CPTII,S$GLB,, | Performed by: ORTHOPAEDIC SURGERY

## 2020-05-18 PROCEDURE — 99214 PR OFFICE/OUTPT VISIT, EST, LEVL IV, 30-39 MIN: ICD-10-PCS | Mod: S$GLB,,, | Performed by: ORTHOPAEDIC SURGERY

## 2020-05-18 PROCEDURE — 99214 OFFICE O/P EST MOD 30 MIN: CPT | Mod: S$GLB,,, | Performed by: ORTHOPAEDIC SURGERY

## 2020-05-18 PROCEDURE — 73564 XR KNEE ORTHO RIGHT WITH FLEXION: ICD-10-PCS | Mod: 26,RT,, | Performed by: RADIOLOGY

## 2020-05-18 PROCEDURE — 73562 XR KNEE ORTHO RIGHT WITH FLEXION: ICD-10-PCS | Mod: 26,LT,, | Performed by: RADIOLOGY

## 2020-05-18 PROCEDURE — 73564 X-RAY EXAM KNEE 4 OR MORE: CPT | Mod: 26,RT,, | Performed by: RADIOLOGY

## 2020-05-18 PROCEDURE — 73562 X-RAY EXAM OF KNEE 3: CPT | Mod: 26,LT,, | Performed by: RADIOLOGY

## 2020-05-18 NOTE — LETTER
May 18, 2020      Tonny Goodson MD  [address]  [facility] [address]           [facility]  [address]  [facility] [address]  Phone: [phone]          Patient: Ashli Michaels   MR Number: [id number]   YOB: 1980   Date of Visit: 5/18/2020       Dear Dr. Tonny Goodson:    Thank you for referring Ashli Michaels to me for evaluation. Attached you will find relevant portions of my assessment and plan of care.    If you have questions, please do not hesitate to call me. I look forward to following Ashli Michaels along with you.    Sincerely,    IRLANDA Corral MD    Enclosure  CC:  No Recipients    If you would like to receive this communication electronically, please contact [email or web address] or [phone] to request more information on [employer] Link access.    For providers and/or their staff who would like to refer a patient to [facility], please contact us through our one-stop-shop provider referral line, [facility], at [phone].    If you feel you have received this communication in error or would no longer like to receive these types of communications, please e-mail [email or web address]

## 2020-05-18 NOTE — PROGRESS NOTES
CC: Right knee pain    40 y.o. Female who presents as a new patient to me. 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 Complaint is right knee pain x 1 week. Reports feeling a pop and immediate pain along the lateral aspect of the knee after an awkward landing while jumping on the trampoline with her daughter. Pain localizes laterally. Presents with an antalgic gait. Reports swelling 24 hours after initial injury. Reports recurring instability and mechanical symptoms. Worse with weight bearing activities, difficulty with end range flexion and extension. Better with rest. Treatment thus far has included rest, activity modifications, oral medications. Saw Dr. Corey Goodson on Thursday following initial injury who then referred her to me. Here today to discuss diagnosis and treatment options.      Denies any pre-existing knee pain.    Does not play any recreational sports or have any future plans to do so.  Exercise activity include straight line running.    PMHx:  She takes hormone replacement therapy but no history of prior DVT/PE.  Negative for tobacco.   Negative for diabetes.     REVIEW OF SYSTEMS:   Constitution: Negative. Negative for chills, fever and night sweats.    Hematologic/Lymphatic: Negative for bleeding problem. Does not bruise/bleed easily.   Skin: Negative for dry skin, itching and rash.   Musculoskeletal: Negative for falls. Positive for right knee pain and muscle weakness.     All other review of symptoms were reviewed and found to be noncontributory.     PAST MEDICAL HISTORY:   Past Medical History:   Diagnosis Date    Premature ovarian failure      PAST SURGICAL HISTORY:   History reviewed. No pertinent surgical history.    FAMILY HISTORY:   Family History   Problem Relation Age of Onset    Colon cancer Maternal Grandmother     Hypertension Neg Hx      labor Neg Hx      SOCIAL HISTORY:   Social History  "    Socioeconomic History    Marital status:      Spouse name: Not on file    Number of children: Not on file    Years of education: Not on file    Highest education level: Not on file   Occupational History    Not on file   Social Needs    Financial resource strain: Not on file    Food insecurity:     Worry: Not on file     Inability: Not on file    Transportation needs:     Medical: Not on file     Non-medical: Not on file   Tobacco Use    Smoking status: Never Smoker    Smokeless tobacco: Never Used   Substance and Sexual Activity    Alcohol use: Yes    Drug use: No    Sexual activity: Yes     Partners: Male     Birth control/protection: None   Lifestyle    Physical activity:     Days per week: Not on file     Minutes per session: Not on file    Stress: Not on file   Relationships    Social connections:     Talks on phone: Not on file     Gets together: Not on file     Attends Evangelical service: Not on file     Active member of club or organization: Not on file     Attends meetings of clubs or organizations: Not on file     Relationship status: Not on file   Other Topics Concern    Not on file   Social History Narrative    Not on file     MEDICATIONS:     Current Outpatient Medications:     estradiol (VIVELLE-DOT) 0.1 mg/24 hr PTSW, Place 1 patch onto the skin twice a week., Disp: 8 patch, Rfl: 6    Current Facility-Administered Medications:     levonorgestrel 20 mcg/24 hr (5 years) IUD 1 Intra Uterine Device, 1 Intra Uterine Device, Intrauterine, , Shanon Romo MD, 1 Intra Uterine Device at 01/17/19 1109    ALLERGIES:   Review of patient's allergies indicates:   Allergen Reactions    Erythromycin Other (See Comments) and Rash     As a child; hives      PHYSICAL EXAMINATION:  /78   Pulse 91   Ht 5' 6" (1.676 m)   Wt 59 kg (130 lb)   BMI 20.98 kg/m²   General: Well-developed well-nourished 40 y.o. femalein no acute distress   Cardiovascular: Regular rhythm by " palpation of distal pulse, normal color and temperature, no concerning varicosities on symptomatic side   Lungs: No labored breathing or wheezing appreciated   Neuro: Alert and oriented ×3   Psychiatric: well oriented to person, place and time, demonstrates normal mood and affect   Skin: No rashes, lesions or ulcers, normal temperature, turgor, and texture on involved extremity    Ortho/SPM Exam  Examination of the right knee demonstrates intact extensor mechanism.  Trace swelling and effusion.  Able to contract her quad.  Guards with pain on attempted terminal hyperextension passively.  Hyperextends approximately 5° on the other side.  No hyperlaxity otherwise.  Flexion to 120° with ease.  Negative posterior drawer.  Stable to varus and valgus stress.  Able to bring the knee into the figure 4 position with a palpable, intact LCL.  Positive Lachman, grade 2B.  Guards with pivot shift testing.  No significant joint line tenderness, medial and lateral.  Physiologic neutral valgus standing alignment.    IMAGING:  X-rays including standing, weight bearing AP and flexion bilateral knees, RIGHT knee lateral and sunrise views ordered and images reviewed by me show:    Negative.     MRI reviewed by me and discussed with patient. Study shows:   1. Complete tear of ACL.  2. Vertical tear of posterior horn medial meniscus.  3. Possible grade 1 sprain of MCL.  4. Multifocal osseous contusion.  5. Moderate joint effusion    On my read:  The posterior horn medial meniscus tear does not clearly communicate with the femoral articular surface of the meniscus    ASSESSMENT:      ICD-10-CM ICD-9-CM   1. Rupture of anterior cruciate ligament of right knee, initial encounter S83.511A 844.2   2. Acute medial meniscus tear of right knee, initial encounter S83.241A 836.0   3. Right knee pain, unspecified chronicity M25.561 719.46     PLAN:     Findings discussed with the patient.  She has a complete ACL tear along with a medial meniscus  tear as well.  She reports subjective instability with day-to-day activities.  Exam findings correlate.  Treatment options discussed.  Surgical reconstruction is recommended given her subjective instability and concurrent meniscal pathology.  The details of such were discussed to include the expected postop rehab and recovery course.  In addition we had a discussion regarding graft options.  I do think allograft is an option in this case.  She would like to think things over with her family and will let us know how she would like to proceed.    Surgical plan is for right knee arthroscopic all inside ACL reconstruction with possible medial meniscus repair    RTC for preoperative appointment     All questions answered    Informed Consent:    The details of the surgical procedure were explained, including the location of probable incisions and a description of possible hardware and/or grafts to be used. Alternatives to both operative and non-operative options with associated risks and benefits were discussed. The patient understands the likely convalescence after surgery and, in particular, the expected postop rehab and recovery course. The outlined risks and potential complications of the proposed procedure include but are not limited to: infection, poor wound healing, scarring, deformity, stiffness, swelling, continued or recurrent pain, instability, hardware or prosthetic failure if implanted, symptomatic hardware requiring removal, weakness, neurovascular injury, numbness, chronic regional pain disorder, tissue nonhealing/irreparability/retear, subsequent contralateral limb injury or pathology, chondral injury, arthritis, fracture, blood clot formation, inability to return to previous level of activity, anesthetic or regional block complication up to death, need for additional procedure as indicated intraoperatively, and potential need for further surgery.    The patient was also informed and understands that the  risks of surgery are greater for patients with a current condition or history of heart disease, obesity, clotting disorders, recurrent infections, steroid use, current or past smoking, and factors such as sedentary lifestyle and noncompliance with medications, therapy or follow-up. The degree of the increased risk is hard to estimate with any degree of precision. If applicable, smoking cessation was discussed.     All questions were answered. The patient has verbalized understanding of these issues and wishes to proceed with the surgery as discussed.    Procedures

## 2020-05-19 ENCOUNTER — PATIENT MESSAGE (OUTPATIENT)
Dept: SPORTS MEDICINE | Facility: CLINIC | Age: 40
End: 2020-05-19

## 2020-05-20 ENCOUNTER — TELEPHONE (OUTPATIENT)
Dept: SPORTS MEDICINE | Facility: CLINIC | Age: 40
End: 2020-05-20

## 2020-05-20 DIAGNOSIS — S83.511A RUPTURE OF ANTERIOR CRUCIATE LIGAMENT OF RIGHT KNEE, INITIAL ENCOUNTER: Primary | ICD-10-CM

## 2020-05-20 DIAGNOSIS — Z01.818 PREOPERATIVE TESTING: Primary | ICD-10-CM

## 2020-05-20 DIAGNOSIS — S83.241A ACUTE MEDIAL MENISCUS TEAR OF RIGHT KNEE, INITIAL ENCOUNTER: ICD-10-CM

## 2020-05-20 NOTE — TELEPHONE ENCOUNTER
Spoke with the patient. Scheduled preop appointment with Darrian Hess 5/21 830 am. Will reach out to confirm surgery date by end of day today. Provided information for central pricing office.

## 2020-05-21 ENCOUNTER — OFFICE VISIT (OUTPATIENT)
Dept: SPORTS MEDICINE | Facility: CLINIC | Age: 40
End: 2020-05-21
Payer: COMMERCIAL

## 2020-05-21 ENCOUNTER — TELEPHONE (OUTPATIENT)
Dept: SPORTS MEDICINE | Facility: CLINIC | Age: 40
End: 2020-05-21

## 2020-05-21 VITALS
WEIGHT: 130 LBS | HEART RATE: 107 BPM | HEIGHT: 66 IN | BODY MASS INDEX: 20.89 KG/M2 | SYSTOLIC BLOOD PRESSURE: 102 MMHG | DIASTOLIC BLOOD PRESSURE: 63 MMHG

## 2020-05-21 DIAGNOSIS — S83.511A RUPTURE OF ANTERIOR CRUCIATE LIGAMENT OF RIGHT KNEE, INITIAL ENCOUNTER: Primary | ICD-10-CM

## 2020-05-21 DIAGNOSIS — S83.241A ACUTE MEDIAL MENISCUS TEAR OF RIGHT KNEE, INITIAL ENCOUNTER: ICD-10-CM

## 2020-05-21 DIAGNOSIS — S83.511A RIGHT ACL TEAR: ICD-10-CM

## 2020-05-21 PROCEDURE — 99999 PR PBB SHADOW E&M-EST. PATIENT-LVL III: ICD-10-PCS | Mod: PBBFAC,,, | Performed by: PHYSICIAN ASSISTANT

## 2020-05-21 PROCEDURE — 99999 PR PBB SHADOW E&M-EST. PATIENT-LVL III: CPT | Mod: PBBFAC,,, | Performed by: PHYSICIAN ASSISTANT

## 2020-05-21 PROCEDURE — 99499 NO LOS: ICD-10-PCS | Mod: S$GLB,,, | Performed by: PHYSICIAN ASSISTANT

## 2020-05-21 PROCEDURE — 99499 UNLISTED E&M SERVICE: CPT | Mod: S$GLB,,, | Performed by: PHYSICIAN ASSISTANT

## 2020-05-21 RX ORDER — ASPIRIN 81 MG/1
81 TABLET ORAL 2 TIMES DAILY
Qty: 28 TABLET | Refills: 0 | Status: SHIPPED | OUTPATIENT
Start: 2020-05-21 | End: 2021-01-27

## 2020-05-21 RX ORDER — SODIUM CHLORIDE 9 MG/ML
INJECTION, SOLUTION INTRAVENOUS CONTINUOUS
Status: CANCELLED | OUTPATIENT
Start: 2020-05-21

## 2020-05-21 RX ORDER — ONDANSETRON 4 MG/1
4 TABLET, FILM COATED ORAL EVERY 8 HOURS PRN
Qty: 21 TABLET | Refills: 0 | Status: SHIPPED | OUTPATIENT
Start: 2020-05-21 | End: 2020-05-29

## 2020-05-21 RX ORDER — OXYCODONE HYDROCHLORIDE 5 MG/1
5 TABLET ORAL EVERY 6 HOURS PRN
Qty: 28 TABLET | Refills: 0 | Status: SHIPPED | OUTPATIENT
Start: 2020-05-21 | End: 2020-05-28 | Stop reason: SDUPTHER

## 2020-05-21 RX ORDER — ROPIVACAINE/EPI/CLONIDINE/KET 2.46-0.005
SYRINGE (ML) INJECTION ONCE
Status: CANCELLED | OUTPATIENT
Start: 2020-05-21 | End: 2020-05-21

## 2020-05-21 NOTE — H&P
Ashli Michaels  is here for a completion of her perioperative paperwork. she  Is scheduled to undergo right knee arthroscopic all inside ACL reconstruction with possible medial meniscus repair on 2020.  She is a healthy individual and does not need clearance for this procedure.     Risks, indications and benefits of the surgical procedure were discussed with the patient. All questions with regard to surgery, rehab, expected return to functional activities, activities of daily living and recreational endeavors were answered to her satisfaction.    Discussed COVID-19 with the patient, they are aware of our current policies and procedures, were given the option of delaying surgery, and they elect to proceed.    Patient was informed and understands the risks of surgery are greater for patients with a current condition or hx of heart disease, obesity, clotting disorders, recurrent infections, steroid use, current or past smoking, and factors such as sedentary lifestyle and noncompliance with medications, therapy or f/u. The degree of the increased risk is hard to estimate w/ any degree of precision.    Once no other questions were asked, a brief history and physical exam was then performed.    PAST MEDICAL HISTORY:   Past Medical History:   Diagnosis Date    Premature ovarian failure      PAST SURGICAL HISTORY: History reviewed. No pertinent surgical history.  FAMILY HISTORY:   Family History   Problem Relation Age of Onset    Colon cancer Maternal Grandmother     Hypertension Neg Hx      labor Neg Hx      SOCIAL HISTORY:   Social History     Socioeconomic History    Marital status:      Spouse name: Not on file    Number of children: Not on file    Years of education: Not on file    Highest education level: Not on file   Occupational History    Not on file   Social Needs    Financial resource strain: Not on file    Food insecurity:     Worry: Not on file     Inability: Not on file     Transportation needs:     Medical: Not on file     Non-medical: Not on file   Tobacco Use    Smoking status: Never Smoker    Smokeless tobacco: Never Used   Substance and Sexual Activity    Alcohol use: Yes    Drug use: No    Sexual activity: Yes     Partners: Male     Birth control/protection: None   Lifestyle    Physical activity:     Days per week: Not on file     Minutes per session: Not on file    Stress: Not on file   Relationships    Social connections:     Talks on phone: Not on file     Gets together: Not on file     Attends Christian service: Not on file     Active member of club or organization: Not on file     Attends meetings of clubs or organizations: Not on file     Relationship status: Not on file   Other Topics Concern    Not on file   Social History Narrative    Not on file       MEDICATIONS:   Current Outpatient Medications:     estradiol (VIVELLE-DOT) 0.1 mg/24 hr PTSW, Place 1 patch onto the skin twice a week., Disp: 8 patch, Rfl: 6    aspirin (ECOTRIN) 81 MG EC tablet, Take 1 tablet (81 mg total) by mouth 2 (two) times daily. for 14 days, Disp: 28 tablet, Rfl: 0    ondansetron (ZOFRAN) 4 MG tablet, Take 1 tablet (4 mg total) by mouth every 8 (eight) hours as needed., Disp: 21 tablet, Rfl: 0    oxyCODONE (ROXICODONE) 5 MG immediate release tablet, Take 1 tablet (5 mg total) by mouth every 6 (six) hours as needed., Disp: 28 tablet, Rfl: 0    Current Facility-Administered Medications:     levonorgestrel 20 mcg/24 hr (5 years) IUD 1 Intra Uterine Device, 1 Intra Uterine Device, Intrauterine, , Shanon Romo MD, 1 Intra Uterine Device at 01/17/19 1109  ALLERGIES:   Review of patient's allergies indicates:   Allergen Reactions    Erythromycin Other (See Comments) and Rash     As a child; hives       Review of Systems   Constitution: Negative. Negative for chills, fever and night sweats.   HENT: Negative for congestion and headaches.    Eyes: Negative for blurred vision, left  vision loss and right vision loss.   Cardiovascular: Negative for chest pain and syncope.   Respiratory: Negative for cough and shortness of breath.    Endocrine: Negative for polydipsia, polyphagia and polyuria.   Hematologic/Lymphatic: Negative for bleeding problem. Does not bruise/bleed easily.   Skin: Negative for dry skin, itching and rash.   Musculoskeletal: Negative for falls and muscle weakness.   Gastrointestinal: Negative for abdominal pain and bowel incontinence.   Genitourinary: Negative for bladder incontinence and nocturia.   Neurological: Negative for disturbances in coordination, loss of balance and seizures.   Psychiatric/Behavioral: Negative for depression. The patient does not have insomnia.    Allergic/Immunologic: Negative for hives and persistent infections.     PHYSICAL EXAM:  GEN: A&Ox3, WD WN NAD  HEENT: WNL  CHEST: CTAB, no W/R/R  HEART: RRR, no M/R/G   ABD: Soft, NT ND, BS x4 QUADS  MS: Refer to previous note for detailed MS exam  NEURO: CN II-XII intact       The surgical consent was then reviewed with the patient, who agreed with all the contents of the consent form and it was signed.     PHYSICAL THERAPY:  She was also instructed regarding physical therapy and will begin on POD#1-3. She is doing physical therapy at [facility].    POST OP CARE: Instructions were reviewed including care of the wound and dressing after surgery and when she can shower.     PAIN MANAGEMENT: Ashli CARTAGENA Brando was instructed regarding the Polar ice unit that will be in place after surgery and her postoperative pain medications.     MEDICATION:  Roxicodone 5 mg 1-2 q 4 hours PRN for pain  Zofran 4 mg q 8 hours PRN for nausea and vomiting.  Aspirin 81mg BID x 2 weeks for DVT prophylaxis starting on the evening after surgery.      Post op meds to be delivered bedside prior to discharge. Deliver to family if patient is in surgery at 5pm.     Patient was instructed to purchase and take  Colace to counter possible GI side effects of taking opiates.     DVT prophylaxis was discussed with the patient today including risk factors for developing DVTs and history of DVTs. The patient was asked if any specific recommendations were given from the doctor/s that did pre-operative surgical clearance.      If the patient was previously taking 81mg baby aspirin, they were told to not take additional baby aspirin, using the above stated aspirin and to restart the 81mg aspirin daily after completion of the aspirin dose.      Patient was also told to buy over the counter Prilosec medication and take it once daily for GI protection as long as they are taking NSAIDs or Aspirin.     The patient was told that narcotic pain medications may make them drowsy and instructions were given to not sign legal documents, drive or operate heavy machinery, cars, or equipment while under the influence of narcotic medications.     Dr. Corral was present in clinic during this pre-op evaluation. The patient was offered the opportunity to ask Dr. Corral any further questions regarding the procedure which may not have been addressed during their previous informed consent discussion. The patient has declined to see Dr. Corral today.    As there were no other questions to be asked, she was given my business card along with Dr. Corral's business card if she has any questions or concerns prior to surgery or in the postop period.     I explained to following and the patient expressed understanding:  The patient is currently aware of the COVID19 pandemic and that proceeding with their surgical procedure could potentially increase exposure to coronavirus in the community. The patient understands that there is the possibility of delayed or cancelled appts or PT visits in the future. They understand that infection with the coronavirus could complicate their surgery recovery. They are aware of the current policies and procedures of [facility] and  the government regarding the pandemic and they were given the option of delaying my surgery. The patient elects to proceed with surgery at this time.      Patient denies having any symptoms such as cough, SOB, fever, loss of taste/smell.     The patient has been scheduled to undergo coronavirus testing on the day prior to surgery.      The patient was instructed to practice strict social distancing, hand washing/hygiene, respiratory hygiene, and cough etiquette from now until 6 weeks following surgery to reduce the risk of willa coronavirus.

## 2020-05-21 NOTE — H&P (VIEW-ONLY)
Ashli Michaels  is here for a completion of her perioperative paperwork. she  Is scheduled to undergo right knee arthroscopic all inside ACL reconstruction with possible medial meniscus repair on 2020.  She is a healthy individual and does not need clearance for this procedure.     Risks, indications and benefits of the surgical procedure were discussed with the patient. All questions with regard to surgery, rehab, expected return to functional activities, activities of daily living and recreational endeavors were answered to her satisfaction.    Discussed COVID-19 with the patient, they are aware of our current policies and procedures, were given the option of delaying surgery, and they elect to proceed.    Patient was informed and understands the risks of surgery are greater for patients with a current condition or hx of heart disease, obesity, clotting disorders, recurrent infections, steroid use, current or past smoking, and factors such as sedentary lifestyle and noncompliance with medications, therapy or f/u. The degree of the increased risk is hard to estimate w/ any degree of precision.    Once no other questions were asked, a brief history and physical exam was then performed.    PAST MEDICAL HISTORY:   Past Medical History:   Diagnosis Date    Premature ovarian failure      PAST SURGICAL HISTORY: History reviewed. No pertinent surgical history.  FAMILY HISTORY:   Family History   Problem Relation Age of Onset    Colon cancer Maternal Grandmother     Hypertension Neg Hx      labor Neg Hx      SOCIAL HISTORY:   Social History     Socioeconomic History    Marital status:      Spouse name: Not on file    Number of children: Not on file    Years of education: Not on file    Highest education level: Not on file   Occupational History    Not on file   Social Needs    Financial resource strain: Not on file    Food insecurity:     Worry: Not on file     Inability: Not on file     Transportation needs:     Medical: Not on file     Non-medical: Not on file   Tobacco Use    Smoking status: Never Smoker    Smokeless tobacco: Never Used   Substance and Sexual Activity    Alcohol use: Yes    Drug use: No    Sexual activity: Yes     Partners: Male     Birth control/protection: None   Lifestyle    Physical activity:     Days per week: Not on file     Minutes per session: Not on file    Stress: Not on file   Relationships    Social connections:     Talks on phone: Not on file     Gets together: Not on file     Attends Worship service: Not on file     Active member of club or organization: Not on file     Attends meetings of clubs or organizations: Not on file     Relationship status: Not on file   Other Topics Concern    Not on file   Social History Narrative    Not on file       MEDICATIONS:   Current Outpatient Medications:     estradiol (VIVELLE-DOT) 0.1 mg/24 hr PTSW, Place 1 patch onto the skin twice a week., Disp: 8 patch, Rfl: 6    aspirin (ECOTRIN) 81 MG EC tablet, Take 1 tablet (81 mg total) by mouth 2 (two) times daily. for 14 days, Disp: 28 tablet, Rfl: 0    ondansetron (ZOFRAN) 4 MG tablet, Take 1 tablet (4 mg total) by mouth every 8 (eight) hours as needed., Disp: 21 tablet, Rfl: 0    oxyCODONE (ROXICODONE) 5 MG immediate release tablet, Take 1 tablet (5 mg total) by mouth every 6 (six) hours as needed., Disp: 28 tablet, Rfl: 0    Current Facility-Administered Medications:     levonorgestrel 20 mcg/24 hr (5 years) IUD 1 Intra Uterine Device, 1 Intra Uterine Device, Intrauterine, , Shanon Romo MD, 1 Intra Uterine Device at 01/17/19 1109  ALLERGIES:   Review of patient's allergies indicates:   Allergen Reactions    Erythromycin Other (See Comments) and Rash     As a child; hives       Review of Systems   Constitution: Negative. Negative for chills, fever and night sweats.   HENT: Negative for congestion and headaches.    Eyes: Negative for blurred vision, left  vision loss and right vision loss.   Cardiovascular: Negative for chest pain and syncope.   Respiratory: Negative for cough and shortness of breath.    Endocrine: Negative for polydipsia, polyphagia and polyuria.   Hematologic/Lymphatic: Negative for bleeding problem. Does not bruise/bleed easily.   Skin: Negative for dry skin, itching and rash.   Musculoskeletal: Negative for falls and muscle weakness.   Gastrointestinal: Negative for abdominal pain and bowel incontinence.   Genitourinary: Negative for bladder incontinence and nocturia.   Neurological: Negative for disturbances in coordination, loss of balance and seizures.   Psychiatric/Behavioral: Negative for depression. The patient does not have insomnia.    Allergic/Immunologic: Negative for hives and persistent infections.     PHYSICAL EXAM:  GEN: A&Ox3, WD WN NAD  HEENT: WNL  CHEST: CTAB, no W/R/R  HEART: RRR, no M/R/G   ABD: Soft, NT ND, BS x4 QUADS  MS: Refer to previous note for detailed MS exam  NEURO: CN II-XII intact       The surgical consent was then reviewed with the patient, who agreed with all the contents of the consent form and it was signed.     PHYSICAL THERAPY:  She was also instructed regarding physical therapy and will begin on POD#1-3. She is doing physical therapy at [facility].    POST OP CARE: Instructions were reviewed including care of the wound and dressing after surgery and when she can shower.     PAIN MANAGEMENT: Ashli CARTAGENA Brando was instructed regarding the Polar ice unit that will be in place after surgery and her postoperative pain medications.     MEDICATION:  Roxicodone 5 mg 1-2 q 4 hours PRN for pain  Zofran 4 mg q 8 hours PRN for nausea and vomiting.  Aspirin 81mg BID x 2 weeks for DVT prophylaxis starting on the evening after surgery.      Post op meds to be delivered bedside prior to discharge. Deliver to family if patient is in surgery at 5pm.     Patient was instructed to purchase and take  Colace to counter possible GI side effects of taking opiates.     DVT prophylaxis was discussed with the patient today including risk factors for developing DVTs and history of DVTs. The patient was asked if any specific recommendations were given from the doctor/s that did pre-operative surgical clearance.      If the patient was previously taking 81mg baby aspirin, they were told to not take additional baby aspirin, using the above stated aspirin and to restart the 81mg aspirin daily after completion of the aspirin dose.      Patient was also told to buy over the counter Prilosec medication and take it once daily for GI protection as long as they are taking NSAIDs or Aspirin.     The patient was told that narcotic pain medications may make them drowsy and instructions were given to not sign legal documents, drive or operate heavy machinery, cars, or equipment while under the influence of narcotic medications.     Dr. Corral was present in clinic during this pre-op evaluation. The patient was offered the opportunity to ask Dr. Corral any further questions regarding the procedure which may not have been addressed during their previous informed consent discussion. The patient has declined to see Dr. Corral today.    As there were no other questions to be asked, she was given my business card along with Dr. Corral's business card if she has any questions or concerns prior to surgery or in the postop period.     I explained to following and the patient expressed understanding:  The patient is currently aware of the COVID19 pandemic and that proceeding with their surgical procedure could potentially increase exposure to coronavirus in the community. The patient understands that there is the possibility of delayed or cancelled appts or PT visits in the future. They understand that infection with the coronavirus could complicate their surgery recovery. They are aware of the current policies and procedures of [facility] and  the government regarding the pandemic and they were given the option of delaying my surgery. The patient elects to proceed with surgery at this time.      Patient denies having any symptoms such as cough, SOB, fever, loss of taste/smell.     The patient has been scheduled to undergo coronavirus testing on the day prior to surgery.      The patient was instructed to practice strict social distancing, hand washing/hygiene, respiratory hygiene, and cough etiquette from now until 6 weeks following surgery to reduce the risk of willa coronavirus.

## 2020-05-21 NOTE — TELEPHONE ENCOUNTER
Spoke with the patient. I was able to get through to the Authorization department she provided the number for. They have switched the procedure to urgent. Will follow up in 24 hours. Notified her of her COVID19 test appointment. Patient verbalized understanding to all.

## 2020-05-22 ENCOUNTER — ANESTHESIA EVENT (OUTPATIENT)
Dept: SURGERY | Facility: HOSPITAL | Age: 40
End: 2020-05-22
Payer: COMMERCIAL

## 2020-05-22 ENCOUNTER — TELEPHONE (OUTPATIENT)
Dept: SPORTS MEDICINE | Facility: CLINIC | Age: 40
End: 2020-05-22

## 2020-05-22 NOTE — TELEPHONE ENCOUNTER
Spoke with the patient. Notified of her 630AM arrival time on 5/25 to [facility], Jose KAUFMAN. Patient verbalized understanding.

## 2020-05-23 ENCOUNTER — LAB VISIT (OUTPATIENT)
Dept: INTERNAL MEDICINE | Facility: CLINIC | Age: 40
End: 2020-05-23
Payer: COMMERCIAL

## 2020-05-23 DIAGNOSIS — Z01.818 PREOPERATIVE TESTING: ICD-10-CM

## 2020-05-23 LAB — SARS-COV-2 RNA RESP QL NAA+PROBE: NOT DETECTED

## 2020-05-23 PROCEDURE — U0003 INFECTIOUS AGENT DETECTION BY NUCLEIC ACID (DNA OR RNA); SEVERE ACUTE RESPIRATORY SYNDROME CORONAVIRUS 2 (SARS-COV-2) (CORONAVIRUS DISEASE [COVID-19]), AMPLIFIED PROBE TECHNIQUE, MAKING USE OF HIGH THROUGHPUT TECHNOLOGIES AS DESCRIBED BY CMS-2020-01-R: HCPCS

## 2020-05-23 NOTE — ANESTHESIA PAT ROS NOTE
05/23/2020  Ashli Michaels is a 40 y.o., female.      Pre-op Assessment     I have reviewed the Nursing Notes.   I have reviewed the Medications.     Review of Systems  Anesthesia Hx:  No previous Anesthesia    Social:  Non-Smoker, Social Alcohol Use    Hematology/Oncology:  Hematology Normal   Oncology Normal     EENT/Dental:EENT/Dental Normal   Cardiovascular:   Exercise tolerance: good    Pulmonary:  Pulmonary Normal  Denies Recent URI.  Denies Sleep Apnea.    Renal/:  Renal/ Normal     Hepatic/GI:  Hepatic/GI Normal    Musculoskeletal:  Musculoskeletal Normal    Neurological:  Neurology Normal    Endocrine:  Endocrine Normal    Dermatological:  Skin Normal    Psych:  Psychiatric Normal              Anesthesia Assessment: Preoperative EQUATION    Planned Procedure: Procedure(s) (LRB):  RECONSTRUCTION, LIGAMENT, ANTERIOR CRUCIATE LIGAMENT (Right)  REPAIR, MENISCUS, KNEE (Right)  Requested Anesthesia Type:General  Surgeon: IRLANDA Corral MD  Service: Orthopedics  Known or anticipated Date of Surgery:5/25/2020    Surgeon notes: reviewed    Electronic QUestionnaire Assessment completed via nurse interview with patient.        Triage considerations:     The patient has no apparent active cardiac condition (No unstable coronary Syndrome such as severe unstable angina or recent [<1 month] myocardial infarction, decompensated CHF, severe valvular   disease or significant arrhythmia)    Previous anesthesia records:Not available            Instructions given. (See in Nurse's note)  Covid pending     Patient  has previously scheduled Medical Appointment:    Navigation:  Straight Line to surgery.               No tests, anesthesia preop clinic visit, or consult required.

## 2020-05-23 NOTE — PLAN OF CARE
Covid pending    Contacted patient to review the Covid-19 Procedure/Surgery Confirmation questionnaire.  Patient receptive to questions and all information provided on instructions concerning our temporary temperature, hand washing/sanitizing and visitor policy as per CDC recommendations. No visitors will be allowed into the hospital at this time.  Patient will be dropped off and picked up at the same location.  We will receive permission to text that person with updates,(if patient wishes to do so) as well as when the patient is ready for discharge.  Phone number [phone] provided for patients to call if they were to develop fever, cough or SOB prior to surgery. Instructed to take temperature the night before and the morning of surgery. Travel questions as per travel questionnaire reviewed. Our cleaning protocols for every surgery and every procedure were reviewed, and reassurance provided that safety, as well as following CDC guidelines, are our top priority. Voiced understanding.

## 2020-05-24 ENCOUNTER — TELEPHONE (OUTPATIENT)
Dept: SPORTS MEDICINE | Facility: CLINIC | Age: 40
End: 2020-05-24

## 2020-05-25 ENCOUNTER — ANESTHESIA (OUTPATIENT)
Dept: SURGERY | Facility: HOSPITAL | Age: 40
End: 2020-05-25
Payer: COMMERCIAL

## 2020-05-25 ENCOUNTER — HOSPITAL ENCOUNTER (OUTPATIENT)
Facility: HOSPITAL | Age: 40
Discharge: HOME | End: 2020-05-25
Attending: ORTHOPAEDIC SURGERY | Admitting: ORTHOPAEDIC SURGERY
Payer: COMMERCIAL

## 2020-05-25 DIAGNOSIS — S83.511A RUPTURE OF ANTERIOR CRUCIATE LIGAMENT OF RIGHT KNEE, INITIAL ENCOUNTER: ICD-10-CM

## 2020-05-25 DIAGNOSIS — S83.511A RIGHT ACL TEAR: Primary | ICD-10-CM

## 2020-05-25 LAB
B-HCG UR QL: NEGATIVE
CTP QC/QA: YES

## 2020-05-25 PROCEDURE — 25000003 PHARM REV CODE 250: Performed by: ANESTHESIOLOGY

## 2020-05-25 PROCEDURE — 71000033 HC RECOVERY, INTIAL HOUR: Performed by: ORTHOPAEDIC SURGERY

## 2020-05-25 PROCEDURE — C1751 CATH, INF, PER/CENT/MIDLINE: HCPCS | Performed by: STUDENT IN AN ORGANIZED HEALTH CARE EDUCATION/TRAINING PROGRAM

## 2020-05-25 PROCEDURE — 76942 PR U/S GUIDANCE FOR NEEDLE GUIDANCE: ICD-10-PCS | Mod: 26,,, | Performed by: ANESTHESIOLOGY

## 2020-05-25 PROCEDURE — 37000009 HC ANESTHESIA EA ADD 15 MINS: Performed by: ORTHOPAEDIC SURGERY

## 2020-05-25 PROCEDURE — 29888 PR KNEE SCOPE,AID ANT CRUCIATE REPAIR: ICD-10-PCS | Mod: RT,,, | Performed by: ORTHOPAEDIC SURGERY

## 2020-05-25 PROCEDURE — 63600175 PHARM REV CODE 636 W HCPCS: Performed by: ANESTHESIOLOGY

## 2020-05-25 PROCEDURE — D9220A PRA ANESTHESIA: Mod: CRNA,,, | Performed by: NURSE ANESTHETIST, CERTIFIED REGISTERED

## 2020-05-25 PROCEDURE — 99900035 HC TECH TIME PER 15 MIN (STAT)

## 2020-05-25 PROCEDURE — C1762 CONN TISS, HUMAN(INC FASCIA): HCPCS | Performed by: ORTHOPAEDIC SURGERY

## 2020-05-25 PROCEDURE — 63600175 PHARM REV CODE 636 W HCPCS: Performed by: PHYSICIAN ASSISTANT

## 2020-05-25 PROCEDURE — 25000003 PHARM REV CODE 250: Performed by: NURSE ANESTHETIST, CERTIFIED REGISTERED

## 2020-05-25 PROCEDURE — 71000015 HC POSTOP RECOV 1ST HR: Performed by: ORTHOPAEDIC SURGERY

## 2020-05-25 PROCEDURE — 76942 ECHO GUIDE FOR BIOPSY: CPT | Mod: 26,,, | Performed by: ANESTHESIOLOGY

## 2020-05-25 PROCEDURE — 76942 ECHO GUIDE FOR BIOPSY: CPT | Performed by: STUDENT IN AN ORGANIZED HEALTH CARE EDUCATION/TRAINING PROGRAM

## 2020-05-25 PROCEDURE — C1769 GUIDE WIRE: HCPCS | Performed by: ORTHOPAEDIC SURGERY

## 2020-05-25 PROCEDURE — C1713 ANCHOR/SCREW BN/BN,TIS/BN: HCPCS | Performed by: ORTHOPAEDIC SURGERY

## 2020-05-25 PROCEDURE — 71000016 HC POSTOP RECOV ADDL HR: Performed by: ORTHOPAEDIC SURGERY

## 2020-05-25 PROCEDURE — 37000008 HC ANESTHESIA 1ST 15 MINUTES: Performed by: ORTHOPAEDIC SURGERY

## 2020-05-25 PROCEDURE — 63600175 PHARM REV CODE 636 W HCPCS: Performed by: NURSE ANESTHETIST, CERTIFIED REGISTERED

## 2020-05-25 PROCEDURE — 27201423 OPTIME MED/SURG SUP & DEVICES STERILE SUPPLY: Performed by: ORTHOPAEDIC SURGERY

## 2020-05-25 PROCEDURE — 25000003 PHARM REV CODE 250: Performed by: PHYSICIAN ASSISTANT

## 2020-05-25 PROCEDURE — 29888 ARTHRS AID ACL RPR/AGMNTJ: CPT | Mod: RT,,, | Performed by: ORTHOPAEDIC SURGERY

## 2020-05-25 PROCEDURE — 36000710: Performed by: ORTHOPAEDIC SURGERY

## 2020-05-25 PROCEDURE — 63600175 PHARM REV CODE 636 W HCPCS: Performed by: ORTHOPAEDIC SURGERY

## 2020-05-25 PROCEDURE — 81025 URINE PREGNANCY TEST: CPT | Performed by: PHYSICIAN ASSISTANT

## 2020-05-25 PROCEDURE — 64448 PR NERVE BLOCK INJ, ANES/STEROID, FEMORAL, CONT INFUSION, INCL IMAG GUIDANCE: ICD-10-PCS | Mod: 59,RT,, | Performed by: ANESTHESIOLOGY

## 2020-05-25 PROCEDURE — D9220A PRA ANESTHESIA: ICD-10-PCS | Mod: ANES,,, | Performed by: ANESTHESIOLOGY

## 2020-05-25 PROCEDURE — 64448 NJX AA&/STRD FEM NRV NFS IMG: CPT | Mod: 59,RT,, | Performed by: ANESTHESIOLOGY

## 2020-05-25 PROCEDURE — D9220A PRA ANESTHESIA: Mod: ANES,,, | Performed by: ANESTHESIOLOGY

## 2020-05-25 PROCEDURE — 64448 NJX AA&/STRD FEM NRV NFS IMG: CPT | Performed by: STUDENT IN AN ORGANIZED HEALTH CARE EDUCATION/TRAINING PROGRAM

## 2020-05-25 PROCEDURE — 71000039 HC RECOVERY, EACH ADD'L HOUR: Performed by: ORTHOPAEDIC SURGERY

## 2020-05-25 PROCEDURE — 63600175 PHARM REV CODE 636 W HCPCS: Performed by: STUDENT IN AN ORGANIZED HEALTH CARE EDUCATION/TRAINING PROGRAM

## 2020-05-25 PROCEDURE — 94761 N-INVAS EAR/PLS OXIMETRY MLT: CPT

## 2020-05-25 PROCEDURE — 36000711: Performed by: ORTHOPAEDIC SURGERY

## 2020-05-25 PROCEDURE — D9220A PRA ANESTHESIA: ICD-10-PCS | Mod: CRNA,,, | Performed by: NURSE ANESTHETIST, CERTIFIED REGISTERED

## 2020-05-25 DEVICE — TIGHTROPE BTB: Type: IMPLANTABLE DEVICE | Site: KNEE | Status: FUNCTIONAL

## 2020-05-25 DEVICE — BUTTON TIGHTROPE ABS 14MM RND: Type: IMPLANTABLE DEVICE | Site: KNEE | Status: FUNCTIONAL

## 2020-05-25 DEVICE — DEVICE FIXATION TIGHTROPE OPEN: Type: IMPLANTABLE DEVICE | Site: KNEE | Status: FUNCTIONAL

## 2020-05-25 DEVICE — IMPLANTABLE DEVICE: Type: IMPLANTABLE DEVICE | Site: KNEE | Status: FUNCTIONAL

## 2020-05-25 RX ORDER — PHENYLEPHRINE HYDROCHLORIDE 10 MG/ML
INJECTION INTRAVENOUS
Status: DISCONTINUED | OUTPATIENT
Start: 2020-05-25 | End: 2020-05-25

## 2020-05-25 RX ORDER — EPINEPHRINE 1 MG/ML
INJECTION INTRAMUSCULAR; INTRAVENOUS; SUBCUTANEOUS
Status: DISCONTINUED | OUTPATIENT
Start: 2020-05-25 | End: 2020-05-25 | Stop reason: HOSPADM

## 2020-05-25 RX ORDER — SODIUM CHLORIDE 0.9 % (FLUSH) 0.9 %
10 SYRINGE (ML) INJECTION
Status: DISCONTINUED | OUTPATIENT
Start: 2020-05-25 | End: 2020-05-25 | Stop reason: HOSPADM

## 2020-05-25 RX ORDER — KETAMINE HCL IN 0.9 % NACL 50 MG/5 ML
SYRINGE (ML) INTRAVENOUS
Status: DISCONTINUED | OUTPATIENT
Start: 2020-05-25 | End: 2020-05-25

## 2020-05-25 RX ORDER — HYDROMORPHONE HYDROCHLORIDE 1 MG/ML
0.3 INJECTION, SOLUTION INTRAMUSCULAR; INTRAVENOUS; SUBCUTANEOUS
Status: DISCONTINUED | OUTPATIENT
Start: 2020-05-25 | End: 2020-05-25 | Stop reason: HOSPADM

## 2020-05-25 RX ORDER — ONDANSETRON 2 MG/ML
INJECTION INTRAMUSCULAR; INTRAVENOUS
Status: DISCONTINUED | OUTPATIENT
Start: 2020-05-25 | End: 2020-05-25

## 2020-05-25 RX ORDER — ONDANSETRON 2 MG/ML
4 INJECTION INTRAMUSCULAR; INTRAVENOUS DAILY PRN
Status: DISCONTINUED | OUTPATIENT
Start: 2020-05-25 | End: 2020-05-25 | Stop reason: HOSPADM

## 2020-05-25 RX ORDER — FENTANYL CITRATE 50 UG/ML
25 INJECTION, SOLUTION INTRAMUSCULAR; INTRAVENOUS EVERY 5 MIN PRN
Status: DISCONTINUED | OUTPATIENT
Start: 2020-05-25 | End: 2020-05-25 | Stop reason: HOSPADM

## 2020-05-25 RX ORDER — FENTANYL CITRATE 50 UG/ML
25 INJECTION, SOLUTION INTRAMUSCULAR; INTRAVENOUS EVERY 5 MIN PRN
Status: COMPLETED | OUTPATIENT
Start: 2020-05-25 | End: 2020-05-25

## 2020-05-25 RX ORDER — MIDAZOLAM HYDROCHLORIDE 1 MG/ML
0.5 INJECTION INTRAMUSCULAR; INTRAVENOUS
Status: DISCONTINUED | OUTPATIENT
Start: 2020-05-25 | End: 2020-05-25 | Stop reason: HOSPADM

## 2020-05-25 RX ORDER — PROPOFOL 10 MG/ML
VIAL (ML) INTRAVENOUS
Status: DISCONTINUED | OUTPATIENT
Start: 2020-05-25 | End: 2020-05-25

## 2020-05-25 RX ORDER — CEFAZOLIN SODIUM 1 G/3ML
2 INJECTION, POWDER, FOR SOLUTION INTRAMUSCULAR; INTRAVENOUS
Status: DISCONTINUED | OUTPATIENT
Start: 2020-05-25 | End: 2020-05-25 | Stop reason: HOSPADM

## 2020-05-25 RX ORDER — DEXAMETHASONE SODIUM PHOSPHATE 4 MG/ML
INJECTION, SOLUTION INTRA-ARTICULAR; INTRALESIONAL; INTRAMUSCULAR; INTRAVENOUS; SOFT TISSUE
Status: DISCONTINUED | OUTPATIENT
Start: 2020-05-25 | End: 2020-05-25

## 2020-05-25 RX ORDER — SODIUM CHLORIDE 9 MG/ML
INJECTION, SOLUTION INTRAVENOUS CONTINUOUS
Status: DISCONTINUED | OUTPATIENT
Start: 2020-05-25 | End: 2020-05-25 | Stop reason: HOSPADM

## 2020-05-25 RX ORDER — LIDOCAINE HYDROCHLORIDE 20 MG/ML
INJECTION INTRAVENOUS
Status: DISCONTINUED | OUTPATIENT
Start: 2020-05-25 | End: 2020-05-25

## 2020-05-25 RX ORDER — LIDOCAINE HCL/EPINEPHRINE/PF 2%-1:200K
VIAL (ML) INJECTION
Status: DISCONTINUED
Start: 2020-05-25 | End: 2020-05-25 | Stop reason: HOSPADM

## 2020-05-25 RX ORDER — OXYCODONE HYDROCHLORIDE 5 MG/1
5 TABLET ORAL ONCE
Status: COMPLETED | OUTPATIENT
Start: 2020-05-25 | End: 2020-05-25

## 2020-05-25 RX ORDER — CELECOXIB 200 MG/1
400 CAPSULE ORAL
Status: COMPLETED | OUTPATIENT
Start: 2020-05-25 | End: 2020-05-25

## 2020-05-25 RX ORDER — ROPIVACAINE/EPI/CLONIDINE/KET 2.46-0.005
SYRINGE (ML) INJECTION ONCE
Status: DISCONTINUED | OUTPATIENT
Start: 2020-05-25 | End: 2020-05-25 | Stop reason: HOSPADM

## 2020-05-25 RX ORDER — VANCOMYCIN HYDROCHLORIDE 1 G/20ML
INJECTION, POWDER, LYOPHILIZED, FOR SOLUTION INTRAVENOUS
Status: DISCONTINUED | OUTPATIENT
Start: 2020-05-25 | End: 2020-05-25 | Stop reason: HOSPADM

## 2020-05-25 RX ORDER — VANCOMYCIN HYDROCHLORIDE 500 MG/10ML
INJECTION, POWDER, LYOPHILIZED, FOR SOLUTION INTRAVENOUS
Status: DISCONTINUED | OUTPATIENT
Start: 2020-05-25 | End: 2020-05-25 | Stop reason: HOSPADM

## 2020-05-25 RX ORDER — FENTANYL CITRATE 50 UG/ML
INJECTION, SOLUTION INTRAMUSCULAR; INTRAVENOUS
Status: DISCONTINUED | OUTPATIENT
Start: 2020-05-25 | End: 2020-05-25

## 2020-05-25 RX ORDER — EPHEDRINE SULFATE 50 MG/ML
INJECTION, SOLUTION INTRAVENOUS
Status: DISCONTINUED | OUTPATIENT
Start: 2020-05-25 | End: 2020-05-25

## 2020-05-25 RX ORDER — ACETAMINOPHEN 500 MG
1000 TABLET ORAL
Status: COMPLETED | OUTPATIENT
Start: 2020-05-25 | End: 2020-05-25

## 2020-05-25 RX ORDER — ROPIVACAINE HYDROCHLORIDE 5 MG/ML
INJECTION, SOLUTION EPIDURAL; INFILTRATION; PERINEURAL
Status: DISCONTINUED | OUTPATIENT
Start: 2020-05-25 | End: 2020-05-25

## 2020-05-25 RX ORDER — METHOCARBAMOL 500 MG/1
1000 TABLET, FILM COATED ORAL ONCE
Status: COMPLETED | OUTPATIENT
Start: 2020-05-25 | End: 2020-05-25

## 2020-05-25 RX ORDER — OXYCODONE HYDROCHLORIDE 5 MG/1
5 TABLET ORAL
Status: DISCONTINUED | OUTPATIENT
Start: 2020-05-25 | End: 2020-05-25 | Stop reason: HOSPADM

## 2020-05-25 RX ADMIN — LIDOCAINE HYDROCHLORIDE 60 MG: 20 INJECTION, SOLUTION INTRAVENOUS at 11:05

## 2020-05-25 RX ADMIN — FENTANYL CITRATE 25 MCG: 50 INJECTION INTRAMUSCULAR; INTRAVENOUS at 12:05

## 2020-05-25 RX ADMIN — METHOCARBAMOL TABLETS 1000 MG: 500 TABLET, COATED ORAL at 02:05

## 2020-05-25 RX ADMIN — FENTANYL CITRATE 50 MCG: 50 INJECTION, SOLUTION INTRAMUSCULAR; INTRAVENOUS at 09:05

## 2020-05-25 RX ADMIN — OXYCODONE 5 MG: 5 TABLET ORAL at 01:05

## 2020-05-25 RX ADMIN — LIDOCAINE HYDROCHLORIDE 40 MG: 20 INJECTION, SOLUTION INTRAVENOUS at 09:05

## 2020-05-25 RX ADMIN — DEXAMETHASONE SODIUM PHOSPHATE 8 MG: 4 INJECTION, SOLUTION INTRAMUSCULAR; INTRAVENOUS at 10:05

## 2020-05-25 RX ADMIN — SODIUM CHLORIDE: 0.9 INJECTION, SOLUTION INTRAVENOUS at 07:05

## 2020-05-25 RX ADMIN — CELECOXIB 400 MG: 200 CAPSULE ORAL at 07:05

## 2020-05-25 RX ADMIN — ROPIVACAINE HYDROCHLORIDE 15 ML: 5 INJECTION, SOLUTION EPIDURAL; INFILTRATION; PERINEURAL at 09:05

## 2020-05-25 RX ADMIN — ACETAMINOPHEN 1000 MG: 500 TABLET ORAL at 07:05

## 2020-05-25 RX ADMIN — OXYCODONE HYDROCHLORIDE 5 MG: 5 TABLET ORAL at 12:05

## 2020-05-25 RX ADMIN — FENTANYL CITRATE 50 MCG: 50 INJECTION INTRAMUSCULAR; INTRAVENOUS at 09:05

## 2020-05-25 RX ADMIN — HYDROMORPHONE HYDROCHLORIDE 0.3 MG: 1 INJECTION, SOLUTION INTRAMUSCULAR; INTRAVENOUS; SUBCUTANEOUS at 02:05

## 2020-05-25 RX ADMIN — FENTANYL CITRATE 50 MCG: 50 INJECTION INTRAMUSCULAR; INTRAVENOUS at 08:05

## 2020-05-25 RX ADMIN — PROPOFOL 150 MG: 10 INJECTION, EMULSION INTRAVENOUS at 09:05

## 2020-05-25 RX ADMIN — SODIUM CHLORIDE, SODIUM GLUCONATE, SODIUM ACETATE, POTASSIUM CHLORIDE, MAGNESIUM CHLORIDE, SODIUM PHOSPHATE, DIBASIC, AND POTASSIUM PHOSPHATE: .53; .5; .37; .037; .03; .012; .00082 INJECTION, SOLUTION INTRAVENOUS at 11:05

## 2020-05-25 RX ADMIN — ONDANSETRON 4 MG: 2 INJECTION INTRAMUSCULAR; INTRAVENOUS at 10:05

## 2020-05-25 RX ADMIN — Medication 10 MG: at 11:05

## 2020-05-25 RX ADMIN — ROPIVACAINE HYDROCHLORIDE: 2 INJECTION, SOLUTION EPIDURAL; INFILTRATION at 12:05

## 2020-05-25 RX ADMIN — HYDROMORPHONE HYDROCHLORIDE 0.3 MG: 1 INJECTION, SOLUTION INTRAMUSCULAR; INTRAVENOUS; SUBCUTANEOUS at 01:05

## 2020-05-25 RX ADMIN — PHENYLEPHRINE HYDROCHLORIDE 100 MCG: 10 INJECTION INTRAVENOUS at 10:05

## 2020-05-25 RX ADMIN — EPHEDRINE SULFATE 5 MG: 50 INJECTION INTRAVENOUS at 10:05

## 2020-05-25 RX ADMIN — Medication 20 MG: at 09:05

## 2020-05-25 RX ADMIN — MIDAZOLAM HYDROCHLORIDE 2 MG: 1 INJECTION, SOLUTION INTRAMUSCULAR; INTRAVENOUS at 08:05

## 2020-05-25 RX ADMIN — CEFAZOLIN 2 G: 330 INJECTION, POWDER, FOR SOLUTION INTRAMUSCULAR; INTRAVENOUS at 09:05

## 2020-05-25 NOTE — PROGRESS NOTES
Called  to give discharge instructions. Estimated discharge time given. All questions answered. Verbalized understanding.

## 2020-05-25 NOTE — TELEPHONE ENCOUNTER
Attempted to contact patient, she did not answer.  I left a voicemail stating that her COVID test results were negative.    Farhad Hess PA-C

## 2020-05-25 NOTE — ANESTHESIA PREPROCEDURE EVALUATION
05/25/2020  Ashli Michaels is a 40 y.o., female.    Anesthesia Evaluation    I have reviewed the Patient Summary Reports.    I have reviewed the Nursing Notes.   I have reviewed the Medications.     Review of Systems  Anesthesia Hx:  No problems with previous Anesthesia    Social:  Non-Smoker, No Alcohol Use    Hematology/Oncology:  Hematology Normal   Oncology Normal     EENT/Dental:EENT/Dental Normal   Cardiovascular:  Cardiovascular Normal     Pulmonary:  Pulmonary Normal    Renal/:  Renal/ Normal     Hepatic/GI:  Hepatic/GI Normal    Musculoskeletal:  Musculoskeletal Normal    Neurological:  Neurology Normal    Endocrine:  Endocrine Normal    Dermatological:  Skin Normal    Psych:  Psychiatric Normal           Physical Exam  General:  Well nourished    Airway/Jaw/Neck:  Airway Findings: Mouth Opening: Normal Tongue: Normal  General Airway Assessment: Adult  Mallampati: II  Improves to I with phonation.  TM Distance: Normal, at least 6 cm  Jaw/Neck Findings:     Neck ROM: Normal ROM      Dental:  Dental Findings: In tact, Prominent Incisors   Chest/Lungs:  Chest/Lungs Findings: Clear to auscultation, Normal Respiratory Rate     Heart/Vascular:  Heart Findings: Rate: Normal  Rhythm: Regular Rhythm  Sounds: Normal        Mental Status:  Mental Status Findings:  Cooperative, Alert and Oriented         Anesthesia Plan  Type of Anesthesia, risks & benefits discussed:  Anesthesia Type:  regional, general  Patient's Preference:   Intra-op Monitoring Plan: standard ASA monitors  Intra-op Monitoring Plan Comments:   Post Op Pain Control Plan: per primary service following discharge from PACU, IV/PO Opioids PRN, multimodal analgesia and peripheral nerve block  Post Op Pain Control Plan Comments:   Induction:   IV  Beta Blocker:  Patient is not currently on a Beta-Blocker (No further documentation  required).       Informed Consent: Patient understands risks and agrees with Anesthesia plan.  Questions answered. Anesthesia consent signed with patient.  ASA Score: 1     Day of Surgery Review of History & Physical: I have interviewed and examined the patient. I have reviewed the patient's H&P dated:            Ready For Surgery From Anesthesia Perspective.

## 2020-05-25 NOTE — PROGRESS NOTES
Anesthesia back to bedside to check patient pain level 5/10. Emerson at bedside to teach on Q ball

## 2020-05-25 NOTE — TRANSFER OF CARE
"Anesthesia Transfer of Care Note    Patient: Ashli Michaels    Procedure(s) Performed: Procedure(s) (LRB):  RECONSTRUCTION, LIGAMENT, ANTERIOR CRUCIATE LIGAMENT (Right)  REPAIR, MENISCUS, KNEE (Right)    Patient location: PACU    Anesthesia Type: general    Transport from OR: Transported from OR on 6-10 L/min O2 by face mask with adequate spontaneous ventilation    Post pain: adequate analgesia    Post assessment: no apparent anesthetic complications    Post vital signs: stable    Level of consciousness: sedated    Nausea/Vomiting: no nausea/vomiting    Complications: none    Transfer of care protocol was followed      Last vitals:   Visit Vitals  BP (!) 96/55 (BP Location: Right arm, Patient Position: Lying)   Pulse 63   Temp 36.7 °C (98.1 °F) (Temporal)   Resp 13   Ht 5' 6" (1.676 m)   Wt 59 kg (130 lb)   LMP  (LMP Unknown)   SpO2 100%   Breastfeeding? No   BMI 20.98 kg/m²     "

## 2020-05-25 NOTE — BRIEF OP NOTE
[facility]  Brief Operative Note    Surgery Date: 5/25/2020     Surgeon(s) and Role:     * IRLANDA Corral MD - Primary    Assisting Surgeon: None    Pre-op Diagnosis:  Rupture of anterior cruciate ligament of right knee, initial encounter [S83.511A]  Acute medial meniscus tear of right knee, initial encounter [S83.241A]    Post-op Diagnosis:  Post-Op Diagnosis Codes:     * Rupture of anterior cruciate ligament of right knee, initial encounter [S83.511A]     * Acute medial meniscus tear of right knee, initial encounter [S83.241A]    Procedure(s) (LRB):  RECONSTRUCTION, LIGAMENT, ANTERIOR CRUCIATE LIGAMENT (Right)  REPAIR, MENISCUS, KNEE (Right)  BONE GRAFT- AUTOGRAFT (Right)    Anesthesia: General    Description of the findings of the procedure(s): as above    Estimated Blood Loss: * No values recorded between 5/25/2020 10:13 AM and 5/25/2020 11:47 AM *         Specimens:   Specimen (12h ago, onward)    None            Discharge Note    OUTCOME: Patient tolerated treatment/procedure well without complication and is now ready for discharge.    DISPOSITION: Home or Self Care    FINAL DIAGNOSIS:  Right ACL tear    FOLLOWUP: In clinic    DISCHARGE INSTRUCTIONS:    Discharge Procedure Orders   Call MD for:  temperature >100.4     Call MD for:  persistent nausea and vomiting or diarrhea     Call MD for:  severe uncontrolled pain     Call MD for:  redness, tenderness, or signs of infection (pain, swelling, redness, odor or green/yellow discharge around incision site)     Call MD for:  difficulty breathing or increased cough     Call MD for:  severe persistent headache     Call MD for:  worsening rash     Call MD for:  persistent dizziness, light-headedness, or visual disturbances     Call MD for:  increased confusion or weakness     Leave dressing on - Keep it clean, dry, and intact until clinic visit     Weight bearing restrictions (specify):   Order Comments: WBAT with knee locked in extension  while weight bearing can do ROM as tolerated when not ambulating

## 2020-05-25 NOTE — PROGRESS NOTES
Spoke with Dr. Herzog regarding pt's c/o pain 8/10. Per Dr. Herzog give pt one time dose of 5mg oxycodone IR po.

## 2020-05-25 NOTE — PLAN OF CARE
Patient is AAO and VSS.  Tolerating PO and states pain is tolerable.  Dressing CDI.  Patient states they are ready for d/c.  IV removed.  Catheter tip intact.  Caregiver at bedside.  Discharge instructions reviewed and copy given to the patient and caregiver.  Questions answered.  Both verbalized understanding.  Medication delivered to bedside.   DME provided prior to D/c, adult standard crutches  Patient wheeled to car by RN/PCT.

## 2020-05-25 NOTE — PROGRESS NOTES
5/25/2020 1400    On-Q teaching done with patients , Jone, over the phone. Verbalizes understanding. He agrees to stay with patient for the next 48 hours while medication is infusing. All questions answered. On-Q contract reviewed, home care instruction pamphlet and extra home care supplies provided to patient upon discharge. Encouraged to contact anesthesia with any questions/concerns.

## 2020-05-25 NOTE — PROGRESS NOTES
Spoke with Dr. Herzog regarding pt's order for Celebrex 400mg. Pt has no recent labs. Per Dr. Herzog, ok for pt to take ordered dose.

## 2020-05-25 NOTE — ANESTHESIA PROCEDURE NOTES
Adductor catheter    Patient location during procedure: pre-op   Block not for primary anesthetic.  Reason for block: at surgeon's request and post-op pain management   Post-op Pain Location: right knee  Start time: 5/25/2020 9:51 AM  Timeout: 5/25/2020 8:49 AM   End time: 5/25/2020 9:30 AM    Staffing  Authorizing Provider: Raj Herzog MD  Performing Provider: Dell Castro MD    Preanesthetic Checklist  Completed: patient identified, site marked, surgical consent, pre-op evaluation, timeout performed, IV checked, risks and benefits discussed and monitors and equipment checked  Peripheral Block  Patient position: supine  Prep: ChloraPrep and site prepped and draped  Patient monitoring: heart rate, cardiac monitor, continuous pulse ox, continuous capnometry and frequent blood pressure checks  Block type: adductor canal  Laterality: right  Injection technique: continuous  Needle  Needle type: Tuohy   Needle gauge: 17 G  Needle length: 3.5 in  Needle localization: anatomical landmarks and ultrasound guidance  Catheter type: spring wound  Catheter size: 19 G  Test dose: lidocaine 1.5% with Epi 1-to-200,000 and negative   -ultrasound image captured on disc.  Assessment  Injection assessment: negative parasthesia, local visualized surrounding nerve and negative aspiration  Paresthesia pain: none  Heart rate change: no  Slow fractionated injection: yes  Additional Notes  Cathter placed x2. Initial catheter with equivocal aspiration and test dose x2; no paresthesias or signs of IV injection otherwise. Regardless, catheter removed and replaced. Unequivocally negative aspiration and test dose with second catheter.     VSS.  [facility] RN monitoring vitals throughout procedure.  Patient tolerated procedure well.

## 2020-05-25 NOTE — OP NOTE
??[facility]   OPERATIVE REPORT   ORTHOPAEDIC SURGERY   PROVIDER: DR. FILOMENA OH    PATIENT INFORMATION   Ashli Michaels 40 y.o. female 1980   MRN: [id number]   LOCATION: [facility]     DATE OF PROCEDURE: 5/25/2020     PREOPERATIVE DIAGNOSES:   Right knee ACL tear, complete  Right knee possible medial meniscus tear    POSTOPERATIVE DIAGNOSES:   Right knee ACL tear, complete  Right knee partial peripheral capsular meniscus tear, stable    PROCEDURES PERFORMED:   Right knee arthroscopic all inside aseptic allograft ACL reconstruction (CPT 31035)    Surgeon(s) and Role:     * IRLANDA Oh MD - Primary     * Patrick Garcia MD - Resident - Assisting    ANESTHESIA: General with adductor block and local anesthetic injection (ZACHARY)    ESTIMATED BLOOD LOSS: 30 cc    IMPLANTS:   Implant Name Type Inv. Item Serial No.  Lot No. LRB No. Used   TIGHTROPE BTB - [id number]  TIGHTROPE BTB  [employer] [id number] Right 1   SUT OPEN TIGHTROPE ABS - [id number]  SUT OPEN TIGHTROPE ABS  [employer] [id number] Right 1   BUTTON TIGHTROPE ABS 14MM RND - [id number]  BUTTON TIGHTROPE ABS 14MM RND  [employer] [id number] Right 1   [id number]   [id number]   Right 1   SEWIVELOCK     [id number] Right 1      FINDINGS:  Complete tear of the ACL.  Peripheral capsular tear of the posterior horn medial meniscus which was partial.  No extension into the undersurface of the meniscus.  Stable to probing.  Less than 1 cm in length.  The remainder of the knee meniscus and articular cartilage structures were intact.      SPECIMENS:   Specimen (12h ago, onward)    None        COMPLICATIONS: None.     INTRAOPERATIVE COUNTS: Correct.     PROPHYLACTIC IV ANTIBIOTICS: Given per [facility] Protocol.    INDICATIONS FOR PROCEDURE: Ashli Eng is a very nice 40-year-old who sustained an acute injury to her right knee recently resulting in an ACL tear.  Exam and imaging correlated.  The patient has been indicated for surgery.   Graft options discussed at length beforehand.  Patient is not a recreational athlete.  No cutting or pivoting sports.  She has requested an allograft. The details of the above stated procedure were discussed at length to include the expected postop rehab and recovery course. Outlined risks and potential complications include but are not limited to infection, stiffness, graft tear, contralateral knee ligament tear, fracture, neurovascular injury, blood clot formation, hardware failure, instability, and inability to return to previous level of activity. Prior to proceeding with surgery, the patient participated in physical therapy to regain quadriceps function, allow time to decrease swelling, and reconstitute range of motion.  Of note the patient had no upper respiratory symptoms prior to proceeding with surgery.  She has tested negative for COVID-19.     DETAILS OF THE PROCEDURE: After permit was obtained for the above procedure and regional block was performed, the patient received prophylactic IV antibiotic in preop holding and was brought back to the operating room and placed under general anesthesia.      Examination under anesthesia of the right knee demonstrated: passive range of motion 5° short of physiologic hyperextension to 130 degrees, Lachman grade 2B, negative posterior drawer, 2+ pivot shift, stable to varus and valgus stress at 0 and 30, negative supine dial test at 30 and 90.  Hyperextends approximately 5° on the left knee.     The operative knee and leg were prescrubbed, sterilely prepped and draped in routine fashion.      Verbal timeout was performed to confirm the patient's identity, correct operative site and planned operative procedure.     Diagnostic arthroscopy was performed through standard anterolateral and anteromedial scope portals.  The patellofemoral compartment was intact.  No significant chondral wear seen.  Medially, the patient was found to have a partial peripheral capsular tear on  the femoral surface which did not appear to communicate with the tibial surface.  Stable to probing.  Tear length measured less than 1 cm.  The articular cartilage in that compartment was intact as well.  Given the stability of the partial meniscus tear, no repair was performed.  Within the intercondylar space, the ACL was found to be completely torn.  The PCL was intact.  Laterally, the meniscus and articular cartilage were intact.    On the back table, the aseptic MTF presutured allograft graftlink was prepared and placed under 15 lb of tension to eliminate creep.  It was also soaked in vancomycin infused saline.  Graft diameter was 10.5 on the femoral side, 10 on the tibial side and length measuring 66 mm.      Next, the ACL reconstruction was undertaken. The ACL stump was removed with thermal ablation and shaver and anatomic bony landmarks were marked for the placement of the femoral and tibial sockets. Arthrex retroguides and Flipcutters were used to create the sockets and perform the procedure by an all-inside GraftLink technique. The femoral socket was created at the inferior portion of the bifurcate ridge of the lateral femoral wall with a size 10.5 mm FlipCutter to a depth of 25 mm while the tibial socket was created at the center of the ACL footprint from front to back and toward the base of the medial tibial eminence from medial to lateral, to a depth of 25 mm with a 10 mm FlipCutter. Bony debris was removed and the edges of socket apertures were smoothened. Suture shuttles were used to deliver the graft into the femoral socket first and the tibial socket second. The graft was then secured within the sockets, cinching the self-locking sutures overtop of the proximal button and then over the distal ABS cortical button with the knee in a reduced position maintained at 10 degrees flexion while a moderate force posterior drawer was applied.  After this preliminary tensioning, the knee was placed through  several flexion-extension cycles to eliminate any graft settling or excursion and the graft was re-tensioned in the same manner and the sutures were tied over top of the buttons proximally and distally, and the four tibial sided suture arms were secondarily secured at the proximal tibia with 1 SwiveLock anchor. Final images of the ACL graft were obtained, revealing no lateral wall or roof impingement of the graft at the notch through range of motion. Stability of the ACL graft was assessed and found to be normalized at grade 0 Lachman and grade 0 pivot shift.     The wounds were all closed in layers per usual. Dressings were applied and a brace placed locked in 10° of hyperextension.  A knee cooling sleeve also was used.  There were no apparent complications. The patient was awakened and taken to recovery room in satisfactory condition.?     Soft muscle compartments and a 2+ DP pulse was confirmed at the conclusion of the case.     The patient was extubated and taken to the PACU in stable condition.     POSTOPERATIVE PLAN: Patient will weight bear as tolerates with the brace locked in extension. Range of motion may be full. Plan to follow an ACL allograft rehab protocol. Initial goals include maintenance of full knee extension, regaining flexion, swelling control, and quadriceps activation exercises.  May wean the brace and crutches as soon as she demonstrates good quadriceps control over the knee.  Expected release for sports no earlier than 12 months following [place] sport cord testing for the allograft. DVT prophylaxis with ASA 81 mg twice daily x 2 weeks.

## 2020-05-25 NOTE — ANESTHESIA POSTPROCEDURE EVALUATION
Anesthesia Post Evaluation    Patient: Ashli Michaels    Procedure(s) Performed: Procedure(s) (LRB):  RECONSTRUCTION, LIGAMENT, ANTERIOR CRUCIATE LIGAMENT (Right)  REPAIR, MENISCUS, KNEE (Right)  BONE GRAFT- AUTOGRAFT (Right)    Final Anesthesia Type: general    Patient location during evaluation: PACU  Patient participation: Yes- Able to Participate  Level of consciousness: awake and alert and oriented  Post-procedure vital signs: reviewed and stable  Pain management: adequate  Airway patency: patent    PONV status at discharge: No PONV  Anesthetic complications: no      Cardiovascular status: blood pressure returned to baseline and hemodynamically stable  Respiratory status: unassisted, spontaneous ventilation and room air  Hydration status: euvolemic  Follow-up not needed.          Vitals Value Taken Time   /68 5/25/2020  3:01 PM   Temp 36.5 °C (97.7 °F) 5/25/2020 11:46 AM   Pulse 68 5/25/2020  3:01 PM   Resp 17 5/25/2020  3:01 PM   SpO2 100 % 5/25/2020  3:01 PM   Vitals shown include unvalidated device data.      Event Time     Out of Recovery 13:30:00          Pain/Lopez Score: Pain Rating Prior to Med Admin: 5 (5/25/2020  2:36 PM)  Pain Rating Post Med Admin: 5 (5/25/2020  2:37 PM)  Lopez Score: 9 (5/25/2020 11:46 AM)

## 2020-05-26 ENCOUNTER — PATIENT MESSAGE (OUTPATIENT)
Dept: ADMINISTRATIVE | Facility: OTHER | Age: 40
End: 2020-05-26

## 2020-05-26 NOTE — PROGRESS NOTES
5/26/2020 1140    Patient's  called at home. Reports patients pain well controlled this morning with the On-Q as well as the utilization of PRN pain medications to manage her pain behind the knee. Patient denies signs of local anesthetic toxicity. PNC dressing remains clean, dry, and intact. All questions answered. Encouraged to call if any issues arise.

## 2020-05-26 NOTE — ANESTHESIA POST-OP PAIN MANAGEMENT
Received a call from patient and her  regarding an increase in her pain in the past 15 min to the point of tearing.  We discussed the OnQ ball and made sure that the clamp was unclamped, the dial was set appropriately at 6mL/hr, and that there are no kinks or issues with the tubing.  She describes the pain as more posterior and lateral of her knee.  Discussed with patient, explained that the OnQ ball is likely working appropriately.  She received a pericapsular injection during surgery as well as a bolus from Dr. Herzog at approximately 1400 according to the note.  This may also be a sign of those wearing off.  She is using cold packs and brace appropriately.  She is taking her oxycodone and acetaminophen and I suggested she may need to call the surgery clinic for an escalation in dose if necessary.    Rusty Cardenas MD PGY4/CA3  Anesthesiology  [facility]  Pager: [phone]  Acute Pain Service / [facility]ioperative [facility]  Spectra: [phone] on weekdays, [phone] on nights and weekends

## 2020-05-27 ENCOUNTER — PATIENT MESSAGE (OUTPATIENT)
Dept: SPORTS MEDICINE | Facility: CLINIC | Age: 40
End: 2020-05-27

## 2020-05-27 ENCOUNTER — CLINICAL SUPPORT (OUTPATIENT)
Dept: REHABILITATION | Facility: HOSPITAL | Age: 40
End: 2020-05-27
Payer: COMMERCIAL

## 2020-05-27 VITALS
HEIGHT: 66 IN | TEMPERATURE: 98 F | HEART RATE: 69 BPM | SYSTOLIC BLOOD PRESSURE: 106 MMHG | RESPIRATION RATE: 18 BRPM | DIASTOLIC BLOOD PRESSURE: 68 MMHG | WEIGHT: 130 LBS | BODY MASS INDEX: 20.89 KG/M2 | OXYGEN SATURATION: 100 %

## 2020-05-27 DIAGNOSIS — M25.561 ACUTE PAIN OF RIGHT KNEE: ICD-10-CM

## 2020-05-27 DIAGNOSIS — S83.511A RUPTURE OF ANTERIOR CRUCIATE LIGAMENT OF RIGHT KNEE, INITIAL ENCOUNTER: ICD-10-CM

## 2020-05-27 DIAGNOSIS — S83.511A RUPTURE OF ANTERIOR CRUCIATE LIGAMENT OF RIGHT KNEE, INITIAL ENCOUNTER: Primary | ICD-10-CM

## 2020-05-27 DIAGNOSIS — M62.551 ATROPHY OF MUSCLE OF RIGHT THIGH: ICD-10-CM

## 2020-05-27 DIAGNOSIS — R26.9 ABNORMAL GAIT: ICD-10-CM

## 2020-05-27 PROCEDURE — 97161 PT EVAL LOW COMPLEX 20 MIN: CPT | Performed by: PHYSICAL THERAPIST

## 2020-05-27 PROCEDURE — 97110 THERAPEUTIC EXERCISES: CPT | Performed by: PHYSICAL THERAPIST

## 2020-05-27 NOTE — PLAN OF CARE
[facility]  Physical Therapy Initial Evaluation    Name: Ashli Michaels  Clinic Number: [id number]    Therapy Diagnosis:   Encounter Diagnoses   Name Primary?    Rupture of anterior cruciate ligament of right knee, initial encounter     Acute pain of right knee     Abnormal gait     Atrophy of muscle of right thigh      Physician: Farhad Hess, PA*  Physician Orders: PT Eval and Treat     Patient will weight bear as tolerates with the brace locked in extension. Range of motion may be full. Plan to follow an ACL allograft rehab protocol. Initial goals include maintenance of full knee extension, regaining flexion, swelling control, and quadriceps activation exercises.  May wean the brace and crutches as soon as she demonstrates good quadriceps control over the knee.  Expected release for sports no earlier than 12 months following [place] sport cord testing for the allograft. DVT prophylaxis with ASA 81 mg twice daily x 2 weeks.     Medical Diagnosis from Referral:   Evaluation Date: 5/27/2020 S83.511A (ICD-10-CM) - Rupture of anterior cruciate ligament of right knee, initial encounter  Authorization Period Expiration: 12-31-20  Plan of Care Expiration: 11-30-20  Visit # / Visits authorized: 1/ 20    Time In: 0900  Time Out: 1000  Total Billable Time: 60 minutes    Precautions: Standard    Subjective   Date of onset: 5-25-20  History of current condition - Ashli Eng reports: surgery performed on the above date. Allograft used. Pt injury caused by jumping on trampoline. Pt reports since sx she is doing well. Has generalized R knee pain. Still has pain pump which she will remove today. She has not been moving her knee. Has kept knee locked in brace. Currently using crutches for amb. Reports she has difficulty with ADLS, IADLS. Family is able to assist as needed. Pt is a runner and would like to return to running.     Medical History:   Past Medical History:   Diagnosis Date     Premature ovarian failure        Surgical History:   Ashli Michaels  has a past surgical history that includes Reconstruction of ligament (Right, 5/25/2020); Repair of meniscus of knee (Right, 5/25/2020); and Bone graft (Right, 5/25/2020).    Medications:   Ashli has a current medication list which includes the following prescription(s): aspirin, estradiol, ondansetron, and oxycodone, and the following Facility-Administered Medications: levonorgestrel.    Allergies:   Review of patient's allergies indicates:   Allergen Reactions    Erythromycin Other (See Comments) and Rash     As a child; hives        Imaging, see chart:     Prior Therapy: none  Social History:  lives with their family  Occupation: manager  Prior Level of Function: independent in all activities  Current Level of Function: limited in all activities    Pain:  Current 4/10, worst 10/10, best 4/10   Location: right knee   Description: Aching and Dull  Aggravating Factors: motion  Easing Factors: pain medication, ice, rest and elevation    Pts goals: return to 100%    Objective   Observation: incision sites are closed with suture. Pt has redness an bruising along lateral portal incision and proximal lateral incision. No s/s of infection noted at this time. Instructed pt in s/s of infection and told her to call my or Dr. Corral's office with concerns.    Gait:  MI with use of crutches. She is WBAT with brace locked in full ext. Education on mod 3 point pattern    Functional tests(*=pain):   DL squat: n/t  SL squat: n/t  SLS EO: n/t  SLS EC: n/t  Eccentric step down: n/t  DL jump: n/t  SL hop: n/t    Range of Motion(*=pain):   Knee AROM PROM   Right -5 to 30 1-0-90   Left 5-0-151 5-0-151     Lower Extremity Strength  Not formally tested secondary to post op day 2  Fair quad set noted in full extension, it improves with reps    Joint Mobility:   Good patella mobility in all ranges at this time. Swelling limits some motion inferiorly      Palpation: tenderness along incision sites    Sensation: intact RLE distally. Pt reports some numbness anterior R knee    Edema: generalized swelling noted R knee      CMS Impairment/Limitation/Restriction for FOTO  Survey    Therapist reviewed FOTO scores for Ashli Michaels on 5/27/2020.   FOTO documents entered into [employer] - see Media section.         TREATMENT   Treatment Time In: 0900  Treatment Time Out: 1000  Total Treatment time separate from Evaluation: 30 minutes    Ashli Eng received therapeutic exercises to develop strength, endurance, ROM and flexibility for 30 minutes including:  Instructed and issued hep, time includes education  Heel prop  Quad set  Heels slide  Ankle pumps  Calf stretch  SAQ with strapMary Albertina received cold pack for 10 minutes to R knee.    Home Exercises and Patient Education Provided    Education provided:   - issued hep    Written Home Exercises Provided: yes.  Exercises were reviewed and Ashli Eng was able to demonstrate them prior to the end of the session.  Ashli Eng demonstrated good  understanding of the education provided.     See EMR under Patient Instructions for exercises provided 5/27/2020.    Assessment   Ashli is a 40 y.o. female referred to outpatient Physical Therapy with a medical diagnosis of R ACL reconstruction. Pt presents with decreased ROM, swelling, pain, weakness, abnormal gait, decreased functional movement patterns secondary to the above dx. She shows good understanding of hep, s/s of infection and DVT, and progrnosis with time frames. She would benefit from skilled PT in order to return to plof and running.    Pt prognosis is Excellent.   Pt will benefit from skilled outpatient Physical Therapy to address the deficits stated above and in the chart below, provide pt/family education, and to maximize pt's level of independence.     Plan of care discussed with patient: Yes  Pt's spiritual, cultural and educational needs considered  and patient is agreeable to the plan of care and goals as stated below:     Anticipated Barriers for therapy: none    Medical Necessity is demonstrated by the following  History  Co-morbidities and personal factors that may impact the plan of care Co-morbidities:   none    Personal Factors:   no deficits     low   Examination  Body Structures and Functions, activity limitations and participation restrictions that may impact the plan of care Body Regions:   lower extremities    Body Systems:    ROM  strength  gross coordinated movement  balance  gait  transfers  motor control    Participation Restrictions:   none    Activity limitations:   Learning and applying knowledge  no deficits    General Tasks and Commands  no deficits    Communication  no deficits    Mobility  lifting and carrying objects  walking  driving (bike, car, motorcycle)    Self care  washing oneself (bathing, drying, washing hands)  toileting  dressing  eating    Domestic Life  shopping  cooking  doing house work (cleaning house, washing dishes, laundry)  assisting others    Interactions/Relationships  no deficits    Life Areas  employment    Community and Social Life  recreation and leisure         moderate   Clinical Presentation stable and uncomplicated low   Decision Making/ Complexity Score: low     Goals:  Short Term Goals: 10 weeks   1. Pt independent in initial hep  2. Decreased pain 0-3/10 with activities  3. ROM 5-0-135 or better  4. Pt amb independently without significant deviations  5. Pt strength  4/5 or better throughout RLE  6. Pt able to perform proper body weight squat without significant deviation  7. No appreciable swelling    Long Term Goals: 24 weeks   1. Pt independent in d/c hep  2. Decreased pain 0/10  3. ROM 5-0-150 or better  4. Biodex strength test 85% or better  5. Pt able to perform straight line jogging without significant deviations.    Plan   Plan of care Certification: 5/27/2020 to 11-30-20.    Outpatient Physical  Therapy 1-2 times weekly for 24 weeks to include the following interventions: Manual Therapy, Moist Heat/ Ice, Neuromuscular Re-ed, Patient Education, Therapeutic Activites and Therapeutic Exercise.     Willy Novak, PT

## 2020-05-27 NOTE — PROGRESS NOTES
5/27/2020 1336    Called and spoke with patient. Reported On-Q pump plans to be removed this afternoon once medication is completed. Removal procedure reiterated over the phone, instructed to check for a blue tip to end the of the catheter upon removal. Verbalizes understanding. Reports pain remains controlled with On-Q in addition to pain medications as needed. Denies any s/s of local toxicity. PNC dressing clean, dry and intact. Denies any other concerns at this time. Encouraged to call anesthesia with any questions/concerns.

## 2020-05-27 NOTE — ADDENDUM NOTE
Addendum  created 05/27/20 1340 by Emerson Marin RN    Intraprocedure Event edited, Sign clinical note

## 2020-05-27 NOTE — PROGRESS NOTES
[facility]  Physical Therapy Initial Evaluation    Name: Ashli Michaels  Clinic Number: [id number]    Therapy Diagnosis:   Encounter Diagnoses   Name Primary?    Rupture of anterior cruciate ligament of right knee, initial encounter     Acute pain of right knee     Abnormal gait     Atrophy of muscle of right thigh      Physician: Farhad Hess, PA*  Physician Orders: PT Eval and Treat     Patient will weight bear as tolerates with the brace locked in extension. Range of motion may be full. Plan to follow an ACL allograft rehab protocol. Initial goals include maintenance of full knee extension, regaining flexion, swelling control, and quadriceps activation exercises.  May wean the brace and crutches as soon as she demonstrates good quadriceps control over the knee.  Expected release for sports no earlier than 12 months following [place] sport cord testing for the allograft. DVT prophylaxis with ASA 81 mg twice daily x 2 weeks.     Medical Diagnosis from Referral:   Evaluation Date: 5/27/2020 S83.511A (ICD-10-CM) - Rupture of anterior cruciate ligament of right knee, initial encounter  Authorization Period Expiration: 12-31-20  Plan of Care Expiration: 11-30-20  Visit # / Visits authorized: 1/ 20    Time In: 0900  Time Out: 1000  Total Billable Time: 60 minutes    Precautions: Standard    Subjective   Date of onset: 5-25-20  History of current condition - Ashli Eng reports: surgery performed on the above date. Allograft used. Pt injury caused by jumping on trampoline. Pt reports since sx she is doing well. Has generalized R knee pain. Still has pain pump which she will remove today. She has not been moving her knee. Has kept knee locked in brace. Currently using crutches for amb. Reports she has difficulty with ADLS, IADLS. Family is able to assist as needed. Pt is a runner and would like to return to running.     Medical History:   Past Medical History:   Diagnosis Date     Premature ovarian failure        Surgical History:   Ashli Michaels  has a past surgical history that includes Reconstruction of ligament (Right, 5/25/2020); Repair of meniscus of knee (Right, 5/25/2020); and Bone graft (Right, 5/25/2020).    Medications:   Ashli has a current medication list which includes the following prescription(s): aspirin, estradiol, ondansetron, and oxycodone, and the following Facility-Administered Medications: levonorgestrel.    Allergies:   Review of patient's allergies indicates:   Allergen Reactions    Erythromycin Other (See Comments) and Rash     As a child; hives        Imaging, see chart:     Prior Therapy: none  Social History:  lives with their family  Occupation: manager  Prior Level of Function: independent in all activities  Current Level of Function: limited in all activities    Pain:  Current 4/10, worst 10/10, best 4/10   Location: right knee   Description: Aching and Dull  Aggravating Factors: motion  Easing Factors: pain medication, ice, rest and elevation    Pts goals: return to 100%    Objective   Observation: incision sites are closed with suture. Pt has redness an bruising along lateral portal incision and proximal lateral incision. No s/s of infection noted at this time. Instructed pt in s/s of infection and told her to call my or Dr. Corral's office with concerns.    Gait:  MI with use of crutches. She is WBAT with brace locked in full ext. Education on mod 3 point pattern    Functional tests(*=pain):   DL squat: n/t  SL squat: n/t  SLS EO: n/t  SLS EC: n/t  Eccentric step down: n/t  DL jump: n/t  SL hop: n/t    Range of Motion(*=pain):   Knee AROM PROM   Right -5 to 30 1-0-90   Left 5-0-151 5-0-151     Lower Extremity Strength  Not formally tested secondary to post op day 2  Fair quad set noted in full extension, it improves with reps    Joint Mobility:   Good patella mobility in all ranges at this time. Swelling limits some motion inferiorly      Palpation: tenderness along incision sites    Sensation: intact RLE distally. Pt reports some numbness anterior R knee    Edema: generalized swelling noted R knee      CMS Impairment/Limitation/Restriction for FOTO  Survey    Therapist reviewed FOTO scores for Ashli Michaels on 5/27/2020.   FOTO documents entered into [employer] - see Media section.         TREATMENT   Treatment Time In: 0900  Treatment Time Out: 1000  Total Treatment time separate from Evaluation: 30 minutes    Ashli Eng received therapeutic exercises to develop strength, endurance, ROM and flexibility for 30 minutes including:  Instructed and issued hep, time includes education  Heel prop  Quad set  Heels slide  Ankle pumps  Calf stretch  SAQ with strapMary Albertina received cold pack for 10 minutes to R knee.    Home Exercises and Patient Education Provided    Education provided:   - issued hep    Written Home Exercises Provided: yes.  Exercises were reviewed and Ashli Eng was able to demonstrate them prior to the end of the session.  Ashli Eng demonstrated good  understanding of the education provided.     See EMR under Patient Instructions for exercises provided 5/27/2020.    Assessment   Ashli is a 40 y.o. female referred to outpatient Physical Therapy with a medical diagnosis of R ACL reconstruction. Pt presents with decreased ROM, swelling, pain, weakness, abnormal gait, decreased functional movement patterns secondary to the above dx. She shows good understanding of hep, s/s of infection and DVT, and progrnosis with time frames. She would benefit from skilled PT in order to return to plof and running.    Pt prognosis is Excellent.   Pt will benefit from skilled outpatient Physical Therapy to address the deficits stated above and in the chart below, provide pt/family education, and to maximize pt's level of independence.     Plan of care discussed with patient: Yes  Pt's spiritual, cultural and educational needs considered  and patient is agreeable to the plan of care and goals as stated below:     Anticipated Barriers for therapy: none    Medical Necessity is demonstrated by the following  History  Co-morbidities and personal factors that may impact the plan of care Co-morbidities:   none    Personal Factors:   no deficits     low   Examination  Body Structures and Functions, activity limitations and participation restrictions that may impact the plan of care Body Regions:   lower extremities    Body Systems:    ROM  strength  gross coordinated movement  balance  gait  transfers  motor control    Participation Restrictions:   none    Activity limitations:   Learning and applying knowledge  no deficits    General Tasks and Commands  no deficits    Communication  no deficits    Mobility  lifting and carrying objects  walking  driving (bike, car, motorcycle)    Self care  washing oneself (bathing, drying, washing hands)  toileting  dressing  eating    Domestic Life  shopping  cooking  doing house work (cleaning house, washing dishes, laundry)  assisting others    Interactions/Relationships  no deficits    Life Areas  employment    Community and Social Life  recreation and leisure         moderate   Clinical Presentation stable and uncomplicated low   Decision Making/ Complexity Score: low     Goals:  Short Term Goals: 10 weeks   1. Pt independent in initial hep  2. Decreased pain 0-3/10 with activities  3. ROM 5-0-135 or better  4. Pt amb independently without significant deviations  5. Pt strength  4/5 or better throughout RLE  6. Pt able to perform proper body weight squat without significant deviation  7. No appreciable swelling    Long Term Goals: 24 weeks   1. Pt independent in d/c hep  2. Decreased pain 0/10  3. ROM 5-0-150 or better  4. Biodex strength test 85% or better  5. Pt able to perform straight line jogging without significant deviations.    Plan   Plan of care Certification: 5/27/2020 to 11-30-20.    Outpatient Physical  Therapy 1-2 times weekly for 24 weeks to include the following interventions: Manual Therapy, Moist Heat/ Ice, Neuromuscular Re-ed, Patient Education, Therapeutic Activites and Therapeutic Exercise.     Willy Novak, PT

## 2020-05-28 RX ORDER — OXYCODONE HYDROCHLORIDE 5 MG/1
5 TABLET ORAL EVERY 6 HOURS PRN
Qty: 28 TABLET | Refills: 0 | Status: SHIPPED | OUTPATIENT
Start: 2020-05-28 | End: 2020-06-04

## 2020-05-28 NOTE — TELEPHONE ENCOUNTER
Contacted patient due to excessive pain after surgery.  She had surgery on 05/25/2020.  She states she has been in excruciating pain mostly with physical therapy.  At times she is taken to oxycodone with a Tylenol to help with this pain.  She also states she has a little bit of swelling around the knee which I told her that this is not uncommon immediately after surgery.  I advised that she continue to wear her Andrew hose compression stockings along with elevation of her leg and continued use of polar Care unit.  We went to a lengthy discussion about how to properly take her narcotic pain medication.  I informed her that for the next 2 physical therapy appointments if her knee is really bothering her she can take 2 oxycodone prior to these appointments, however after these to appointment she is to try in wean down to 1 oxycodone with a Tylenol if needed.  I will send refill in for oxycodone 5 mg.  We also discussed the weaning of this narcotic medication on days without physical therapy.  An example of this was to take 1 oxycodone with a Tylenol every 6 hr for the next few days, followed by 1 oxycodone with a Tylenol every 8-10 hours.  At the end of next week I would like to see that she is only taking 1 oxycodone every 12 hr or so, however mostly relying on Tylenol for pain medicine if needed.  Lastly, I acts that if she notices her knee having excessive swelling she is to contact us to be scheduled to be seen in clinic.  Patient verbalized understanding.    Farhad Hess PA-C

## 2020-05-29 ENCOUNTER — PATIENT MESSAGE (OUTPATIENT)
Dept: ADMINISTRATIVE | Facility: OTHER | Age: 40
End: 2020-05-29

## 2020-05-29 ENCOUNTER — CLINICAL SUPPORT (OUTPATIENT)
Dept: REHABILITATION | Facility: HOSPITAL | Age: 40
End: 2020-05-29
Payer: COMMERCIAL

## 2020-05-29 DIAGNOSIS — M25.561 ACUTE PAIN OF RIGHT KNEE: ICD-10-CM

## 2020-05-29 DIAGNOSIS — R26.9 ABNORMAL GAIT: ICD-10-CM

## 2020-05-29 DIAGNOSIS — M62.551 ATROPHY OF MUSCLE OF RIGHT THIGH: ICD-10-CM

## 2020-05-29 PROCEDURE — 97110 THERAPEUTIC EXERCISES: CPT | Performed by: PHYSICAL THERAPIST

## 2020-05-29 PROCEDURE — 97014 ELECTRIC STIMULATION THERAPY: CPT | Performed by: PHYSICAL THERAPIST

## 2020-05-29 NOTE — PROGRESS NOTES
Physical Therapy Daily Treatment Note     Name: Ashli Michaels  Clinic Number: [id number]    Therapy Diagnosis:   Encounter Diagnoses   Name Primary?    Acute pain of right knee     Abnormal gait     Atrophy of muscle of right thigh      Physician: Farhad Hess PA*    Visit Date: 5/29/2020     Patient will weight bear as tolerates with the brace locked in extension. Range of motion may be full. Plan to follow an ACL allograft rehab protocol. Initial goals include maintenance of full knee extension, regaining flexion, swelling control, and quadriceps activation exercises.  May wean the brace and crutches as soon as she demonstrates good quadriceps control over the knee.  Expected release for sports no earlier than 12 months following [place] sport cord testing for the allograft. DVT prophylaxis with ASA 81 mg twice daily x 2 weeks.      Medical Diagnosis from Referral:   Evaluation Date: 5/27/2020 S83.511A (ICD-10-CM) - Rupture of anterior cruciate ligament of right knee, initial encounter  Authorization Period Expiration: 12-31-20  Plan of Care Expiration: 11-30-20  Visit # / Visits authorized: 1/ 20    Time In: 1030  Time Out: 1145  Total Billable Time: 60 minutes    Precautions: Standard    Subjective     Pt reports: Pt states that she is performing hep regularly. Pain fluctuating. Reports no drainage or fever.  She was compliant with home exercise program.  Response to previous treatment: improved knee flexion  Functional change: improved ROM    Pain: 4/10  Location: right knee      Objective     R knee ROM   2-0-93    Ashli Eng received therapeutic exercises to develop strength, endurance, ROM and flexibility for 45 minutes including:  Heel slides  Heel prop  Quad sets  SAQ with Strap  SLR flexion abd, ext  Standing heel raises  Standing knee/hip flexion  Standing step overs  Weight shifts to tolerance    Ashli Eng received NMES to quad for re education x 10 mins in full ext and hyper  extension    Ashli Eng received hot pack for 0 minutes to 0.    Ashli Eng received cold pack for 0 minutes to 0.      Home Exercises Provided and Patient Education Provided     Education provided:   - issued hep    Written Home Exercises Provided: yes.  Exercises were reviewed and Ashli Eng was able to demonstrate them prior to the end of the session.  Ashli Eng demonstrated good  understanding of the education provided.     See EMR under Patient Instructions for exercises provided 5/29/2020.    Assessment     ROM progressing well, quad slowly starting to return. Pt shows good understanding of gait progression and hep    Ashli Eng is progressing well towards her goals.   Pt prognosis is Excellent.     Pt will continue to benefit from skilled outpatient physical therapy to address the deficits listed in the problem list box on initial evaluation, provide pt/family education and to maximize pt's level of independence in the home and community environment.     Pt's spiritual, cultural and educational needs considered and pt agreeable to plan of care and goals.     Anticipated barriers to physical therapy: none    Goals: see eval    Plan     Cont per POC    Willy Novak, PT

## 2020-06-03 ENCOUNTER — CLINICAL SUPPORT (OUTPATIENT)
Dept: REHABILITATION | Facility: HOSPITAL | Age: 40
End: 2020-06-03
Payer: COMMERCIAL

## 2020-06-03 DIAGNOSIS — M25.561 ACUTE PAIN OF RIGHT KNEE: ICD-10-CM

## 2020-06-03 DIAGNOSIS — R26.9 ABNORMAL GAIT: ICD-10-CM

## 2020-06-03 DIAGNOSIS — M62.551 ATROPHY OF MUSCLE OF RIGHT THIGH: ICD-10-CM

## 2020-06-03 PROCEDURE — 97110 THERAPEUTIC EXERCISES: CPT | Performed by: PHYSICAL THERAPIST

## 2020-06-03 PROCEDURE — 97014 ELECTRIC STIMULATION THERAPY: CPT | Performed by: PHYSICAL THERAPIST

## 2020-06-03 NOTE — PROGRESS NOTES
Physical Therapy Daily Treatment Note     Name: Ashli Michaels  Clinic Number: [id number]    Therapy Diagnosis:   Encounter Diagnoses   Name Primary?    Acute pain of right knee     Abnormal gait     Atrophy of muscle of right thigh      Physician: Farhad Hess PA*    Visit Date: 6/3/2020     Patient will weight bear as tolerates with the brace locked in extension. Range of motion may be full. Plan to follow an ACL allograft rehab protocol. Initial goals include maintenance of full knee extension, regaining flexion, swelling control, and quadriceps activation exercises.  May wean the brace and crutches as soon as she demonstrates good quadriceps control over the knee.  Expected release for sports no earlier than 12 months following [place] sport cord testing for the allograft. DVT prophylaxis with ASA 81 mg twice daily x 2 weeks.      Medical Diagnosis from Referral:   Evaluation Date: 5/27/2020 S83.511A (ICD-10-CM) - Rupture of anterior cruciate ligament of right knee, initial encounter  Authorization Period Expiration: 12-31-20  Plan of Care Expiration: 11-30-20  Visit # / Visits authorized:3/ 20    Time In: 0815  Time Out: 0930  Total Billable Time: 60 minutes    Precautions: Standard    Subjective     Pt reports: Pt reports changing bandage and having some skin peel off the blister. States she has been keeping it clean and dry  She was compliant with home exercise program.  Response to previous treatment: improved knee flexion  Functional change: improved ROM    Pain: 4/10  Location: right knee      Objective     R knee ROM   3-0-100    Ashli Eng received therapeutic exercises to develop strength, endurance, ROM and flexibility for 45 minutes including:  Heel slides  Heel prop  Quad sets   SAQ with Strap  LAQ with strap  Standing heel raises with weight shift to RLE 75/25%  Standing knee/hip flexionBLE 20 x  Mini squat holds 1/4 depth 3 x 30 seconds    Ashli Eng received NMES to quad for  re education x 10 mins in full ext and hyper extension    Ashli Eng received hot pack for 0 minutes to 0.    Ashli Eng received cold pack for 0 minutes to 0.      Home Exercises Provided and Patient Education Provided     Education provided:   - issued hep    Written Home Exercises Provided: yes.  Exercises were reviewed and Ashli Eng was able to demonstrate them prior to the end of the session.  Ashli Eng demonstrated good  understanding of the education provided.     See EMR under Patient Instructions for exercises provided 5/29/2020.    Assessment     ROM progressing well. Quad control slowly improving. Pt to cont walking with mal crutches. Will open brace to 30 degrees during amb. Stressed importance of cont to be able to get knee in full ext and to lock in full ext when needed.    Ashli Eng is progressing well towards her goals.   Pt prognosis is Excellent.     Pt will continue to benefit from skilled outpatient physical therapy to address the deficits listed in the problem list box on initial evaluation, provide pt/family education and to maximize pt's level of independence in the home and community environment.     Pt's spiritual, cultural and educational needs considered and pt agreeable to plan of care and goals.     Anticipated barriers to physical therapy: none    Goals: see eval    Plan     Cont per POC    Willy Novak, PT

## 2020-06-04 DIAGNOSIS — Z98.890 S/P ACL RECONSTRUCTION: Primary | ICD-10-CM

## 2020-06-04 RX ORDER — HYDROCODONE BITARTRATE AND ACETAMINOPHEN 5; 325 MG/1; MG/1
1 TABLET ORAL EVERY 12 HOURS PRN
Qty: 20 TABLET | Refills: 0 | Status: SHIPPED | OUTPATIENT
Start: 2020-06-04 | End: 2021-01-27

## 2020-06-05 ENCOUNTER — CLINICAL SUPPORT (OUTPATIENT)
Dept: REHABILITATION | Facility: HOSPITAL | Age: 40
End: 2020-06-05
Payer: COMMERCIAL

## 2020-06-05 DIAGNOSIS — M25.561 ACUTE PAIN OF RIGHT KNEE: ICD-10-CM

## 2020-06-05 DIAGNOSIS — R26.9 ABNORMAL GAIT: ICD-10-CM

## 2020-06-05 DIAGNOSIS — M62.551 ATROPHY OF MUSCLE OF RIGHT THIGH: ICD-10-CM

## 2020-06-05 PROCEDURE — 97110 THERAPEUTIC EXERCISES: CPT | Performed by: PHYSICAL THERAPIST

## 2020-06-05 PROCEDURE — 97014 ELECTRIC STIMULATION THERAPY: CPT | Performed by: PHYSICAL THERAPIST

## 2020-06-05 NOTE — PROGRESS NOTES
Physical Therapy Daily Treatment Note     Name: Ashli Michaels  Clinic Number: [id number]    Therapy Diagnosis:   Encounter Diagnoses   Name Primary?    Acute pain of right knee     Abnormal gait     Atrophy of muscle of right thigh      Physician: Farhad Hess PA*    Visit Date: 6/5/2020     Patient will weight bear as tolerates with the brace locked in extension. Range of motion may be full. Plan to follow an ACL allograft rehab protocol. Initial goals include maintenance of full knee extension, regaining flexion, swelling control, and quadriceps activation exercises.  May wean the brace and crutches as soon as she demonstrates good quadriceps control over the knee.  Expected release for sports no earlier than 12 months following [place] sport cord testing for the allograft. DVT prophylaxis with ASA 81 mg twice daily x 2 weeks.      Medical Diagnosis from Referral:   Evaluation Date: 5/27/2020 S83.511A (ICD-10-CM) - Rupture of anterior cruciate ligament of right knee, initial encounter  Authorization Period Expiration: 12-31-20  Plan of Care Expiration: 11-30-20  Visit # / Visits authorized:4/ 20    Time In: 1630  Time Out: 5425  Total Billable Time: 60 minutes    Precautions: Standard    Subjective     Pt reports: Pt state that her knee is starting to feel better  She was compliant with home exercise program.  Response to previous treatment: improved knee flexion  Functional change: improved ROM    Pain: 1-3/10  Location: right knee      Objective     R knee ROM   3-0-100    Ashli Eng received therapeutic exercises to develop strength, endurance, ROM and flexibility for 45 minutes including:  Heel slides  Heel prop  Quad sets   SAQ with Strap  LAQ with strap  Standing heel raises with weight shift to RLE 75/25%  Standing knee/hip flexionBLE 20 x  Mini squat holds 1/4 depth 3 x 30 seconds  Standing TKE yellow 3 x 10  End range flexion quad isometrics 2 x 10 5 second holds    Ashli Eng  received NMES to quad for re education x 10 mins in full ext and hyper extension    Ashli Eng received hot pack for 0 minutes to 0.    Ashli Eng received cold pack for 0 minutes to 0.      Home Exercises Provided and Patient Education Provided     Education provided:   - issued hep    Written Home Exercises Provided: yes.  Exercises were reviewed and Ashli Eng was able to demonstrate them prior to the end of the session.  Ashli Eng demonstrated good  understanding of the education provided.     See EMR under Patient Instructions for exercises provided 5/29/2020.    Assessment     Pt continues to progress well. Stressed importance of maintaining full extension. She shows VU    Ashli Eng is progressing well towards her goals.   Pt prognosis is Excellent.     Pt will continue to benefit from skilled outpatient physical therapy to address the deficits listed in the problem list box on initial evaluation, provide pt/family education and to maximize pt's level of independence in the home and community environment.     Pt's spiritual, cultural and educational needs considered and pt agreeable to plan of care and goals.     Anticipated barriers to physical therapy: none    Goals: see eval    Plan     Cont per POC    Willy Novak, PT

## 2020-06-09 ENCOUNTER — CLINICAL SUPPORT (OUTPATIENT)
Dept: REHABILITATION | Facility: HOSPITAL | Age: 40
End: 2020-06-09
Payer: COMMERCIAL

## 2020-06-09 ENCOUNTER — OFFICE VISIT (OUTPATIENT)
Dept: SPORTS MEDICINE | Facility: CLINIC | Age: 40
End: 2020-06-09
Payer: COMMERCIAL

## 2020-06-09 ENCOUNTER — HOSPITAL ENCOUNTER (OUTPATIENT)
Dept: RADIOLOGY | Facility: HOSPITAL | Age: 40
Discharge: HOME | End: 2020-06-09
Attending: PHYSICIAN ASSISTANT
Payer: COMMERCIAL

## 2020-06-09 VITALS
WEIGHT: 130 LBS | DIASTOLIC BLOOD PRESSURE: 67 MMHG | HEART RATE: 92 BPM | SYSTOLIC BLOOD PRESSURE: 116 MMHG | HEIGHT: 66 IN | BODY MASS INDEX: 20.89 KG/M2

## 2020-06-09 DIAGNOSIS — M25.561 ACUTE PAIN OF RIGHT KNEE: ICD-10-CM

## 2020-06-09 DIAGNOSIS — R26.9 ABNORMAL GAIT: ICD-10-CM

## 2020-06-09 DIAGNOSIS — S83.511D RUPTURE OF ANTERIOR CRUCIATE LIGAMENT OF RIGHT KNEE, SUBSEQUENT ENCOUNTER: Primary | ICD-10-CM

## 2020-06-09 DIAGNOSIS — M62.551 ATROPHY OF MUSCLE OF RIGHT THIGH: ICD-10-CM

## 2020-06-09 DIAGNOSIS — S83.511D RUPTURE OF ANTERIOR CRUCIATE LIGAMENT OF RIGHT KNEE, SUBSEQUENT ENCOUNTER: ICD-10-CM

## 2020-06-09 PROCEDURE — 97014 ELECTRIC STIMULATION THERAPY: CPT | Performed by: PHYSICAL THERAPIST

## 2020-06-09 PROCEDURE — 73560 XR KNEE 1 OR 2 VIEW RIGHT: ICD-10-PCS | Mod: 26,RT,, | Performed by: RADIOLOGY

## 2020-06-09 PROCEDURE — 73560 X-RAY EXAM OF KNEE 1 OR 2: CPT | Mod: 26,RT,, | Performed by: RADIOLOGY

## 2020-06-09 PROCEDURE — 99024 PR POST-OP FOLLOW-UP VISIT: ICD-10-PCS | Mod: S$GLB,,, | Performed by: PHYSICIAN ASSISTANT

## 2020-06-09 PROCEDURE — 99024 POSTOP FOLLOW-UP VISIT: CPT | Mod: S$GLB,,, | Performed by: PHYSICIAN ASSISTANT

## 2020-06-09 PROCEDURE — 99999 PR PBB SHADOW E&M-EST. PATIENT-LVL III: ICD-10-PCS | Mod: PBBFAC,,, | Performed by: PHYSICIAN ASSISTANT

## 2020-06-09 PROCEDURE — 99999 PR PBB SHADOW E&M-EST. PATIENT-LVL III: CPT | Mod: PBBFAC,,, | Performed by: PHYSICIAN ASSISTANT

## 2020-06-09 PROCEDURE — 73560 X-RAY EXAM OF KNEE 1 OR 2: CPT | Mod: TC,RT

## 2020-06-09 PROCEDURE — 97110 THERAPEUTIC EXERCISES: CPT | Performed by: PHYSICAL THERAPIST

## 2020-06-09 NOTE — PROGRESS NOTES
Physical Therapy Daily Treatment Note     Name: Ashli Michaels  Clinic Number: [id number]    Therapy Diagnosis:   Encounter Diagnoses   Name Primary?    Acute pain of right knee     Abnormal gait     Atrophy of muscle of right thigh      Physician: Farhad Hess PA*    Visit Date: 2020     Patient will weight bear as tolerates with the brace locked in extension. Range of motion may be full. Plan to follow an ACL allograft rehab protocol. Initial goals include maintenance of full knee extension, regaining flexion, swelling control, and quadriceps activation exercises.  May wean the brace and crutches as soon as she demonstrates good quadriceps control over the knee.  Expected release for sports no earlier than 12 months following [place] sport cord testing for the allograft. DVT prophylaxis with ASA 81 mg twice daily x 2 weeks.      Medical Diagnosis from Referral:   Evaluation Date: 2020 S83.511A (ICD-10-CM) - Rupture of anterior cruciate ligament of right knee, initial encounter  Authorization Period Expiration: 20  Plan of Care Expiration: 20  Visit # / Visits authorized:    Time In: 1345  Time Out: 1500  Total Billable Time: 75 minutes    Precautions: Standard    Subjective     Pt reports: Pt state she has focused on knee extension. Feels she is making good progress. Walking some with one crutch, no crutch  She was compliant with home exercise program.  Response to previous treatment: improved knee flexion  Functional change: improved ROM    Pain: 1-3/10  Location: right knee      Objective     R knee ROM   4-0-105    Ashli Eng received therapeutic exercises to develop strength, endurance, ROM and flexibility for 55 minutes includin/20 heel raises 3 x 10  Tempo mini squat 3 x 10, 2/2/2/x  Single leg stance 3 x 1 min max effort  Quad set towel underknee with SLR 2 x 10  SLR abd 2 x 10 with 10/10 hip circles  SLR EXT 2 x10 with 10/10 hip circles  Prone hamstring  curls to tolerance  Heel slides 40 x  Seated 90 degree isometric quad 10 x 5 seconds x2 rounds    Ashli Eng received NMES to quad for re education x 10 mins in full ext and hyper extension    Ashli Eng received hot pack for 0 minutes to 0.    Ashli Eng received cold pack for 0 minutes to 0.      Home Exercises Provided and Patient Education Provided     Education provided:   - issued hep    Written Home Exercises Provided: yes.  Exercises were reviewed and Ashli Eng was able to demonstrate them prior to the end of the session.  Ashli Eng demonstrated good  understanding of the education provided.     See EMR under Patient Instructions for exercises provided 5/29/2020.    Assessment     Pt to f/u with ortho after tx. She is doing well. ROM progressing well. Has good end range knee extension but still has some mild quad lag that is not uncommon for per time out from sx.     Ashli Eng is progressing well towards her goals.   Pt prognosis is Excellent.     Pt will continue to benefit from skilled outpatient physical therapy to address the deficits listed in the problem list box on initial evaluation, provide pt/family education and to maximize pt's level of independence in the home and community environment.     Pt's spiritual, cultural and educational needs considered and pt agreeable to plan of care and goals.     Anticipated barriers to physical therapy: none    Goals: see eval    Plan     Cont per POC    Willy Novak, PT

## 2020-06-09 NOTE — PROGRESS NOTES
S:Ashli Michaels presents for post-operative evaluation.     DATE OF PROCEDURE: 5/25/2020       PROCEDURES PERFORMED:   Right knee arthroscopic all inside aseptic allograft ACL reconstruction (CPT 06189)     Surgeon(s) and Role:     * IRLANDA Corral MD - Primary     * Patrick Garcia MD - Resident - Assisting    Ashli Michaels reports to be doing well 2wk s/p the above mentioned procedure. Denies fevers, chills, night sweats, chest pain, difficulty breathing, calf pain or tenderness. Going to PT 1-2xWeek at the [facility]. Seeing good progress daily. Pain levels are improving.  She states that she is still taking hydrocodone for pain right now.  She takes about 2 per day.  She notices most of her soreness after physical therapy.  She has trouble staying asleep at night as well due to pain.  She states this is getting better since last week.  She is still using her crutches and knee brace.  She is toe-touch weight-bearing at this time as she is trying to gradually put more more weight on her knee.    O: The incisions are healing well.  No signs of infection.  Suture tags were removed. No significant pain or unusual tenderness.  Active range of motion in clinic today was full extension with 105° flexion.    RADIOGRAPHS:  X-ray of right knee taken in clinic today, images reviewed by me, demonstrates postoperative hardware in proper positioning.  No evidence of fracture or dislocation.    A/P:  Steri-Strips applied.  Discussed that she can wean off crutches as soon as she feels strong enough to ambulate without these.  She is to continue using the brace until strong enough to not have to have the support any more.  She finished her aspirin.  We had a discussion about weaning off of pain medication by the end of this week.  Plan to follow the rehab plan as previously outlined. RTC in 4 weeks.     POSTOPERATIVE PLAN: Patient will weight bear as tolerates with the brace locked in extension. Range of  motion may be full. Plan to follow an ACL allograft rehab protocol. Initial goals include maintenance of full knee extension, regaining flexion, swelling control, and quadriceps activation exercises.  May wean the brace and crutches as soon as she demonstrates good quadriceps control over the knee.  Expected release for sports no earlier than 12 months following [place] sport cord testing for the allograft. DVT prophylaxis with ASA 81 mg twice daily x 2 weeks.

## 2020-06-12 ENCOUNTER — CLINICAL SUPPORT (OUTPATIENT)
Dept: REHABILITATION | Facility: HOSPITAL | Age: 40
End: 2020-06-12
Payer: COMMERCIAL

## 2020-06-12 ENCOUNTER — LAB VISIT (OUTPATIENT)
Dept: PRIMARY CARE CLINIC | Facility: OTHER | Age: 40
End: 2020-06-12
Payer: COMMERCIAL

## 2020-06-12 DIAGNOSIS — R26.9 ABNORMAL GAIT: ICD-10-CM

## 2020-06-12 DIAGNOSIS — M25.561 ACUTE PAIN OF RIGHT KNEE: ICD-10-CM

## 2020-06-12 DIAGNOSIS — S83.511D RUPTURE OF ANTERIOR CRUCIATE LIGAMENT OF RIGHT KNEE, SUBSEQUENT ENCOUNTER: Primary | ICD-10-CM

## 2020-06-12 DIAGNOSIS — M62.551 ATROPHY OF MUSCLE OF RIGHT THIGH: ICD-10-CM

## 2020-06-12 DIAGNOSIS — Z11.59 ENCOUNTER FOR SCREENING FOR OTHER VIRAL DISEASES: ICD-10-CM

## 2020-06-12 PROCEDURE — U0003 INFECTIOUS AGENT DETECTION BY NUCLEIC ACID (DNA OR RNA); SEVERE ACUTE RESPIRATORY SYNDROME CORONAVIRUS 2 (SARS-COV-2) (CORONAVIRUS DISEASE [COVID-19]), AMPLIFIED PROBE TECHNIQUE, MAKING USE OF HIGH THROUGHPUT TECHNOLOGIES AS DESCRIBED BY CMS-2020-01-R: HCPCS

## 2020-06-12 PROCEDURE — 97110 THERAPEUTIC EXERCISES: CPT | Performed by: PHYSICAL THERAPIST

## 2020-06-12 RX ORDER — TRAMADOL HYDROCHLORIDE 50 MG/1
TABLET ORAL
Qty: 20 TABLET | Refills: 0 | Status: SHIPPED | OUTPATIENT
Start: 2020-06-12 | End: 2021-01-27

## 2020-06-12 NOTE — PROGRESS NOTES
Physical Therapy Daily Treatment Note     Name: Ashli Michaels  Clinic Number: [id number]    Therapy Diagnosis:   No diagnosis found.  Physician: Farhad Hess PA*    Visit Date: 2020     Patient will weight bear as tolerates with the brace locked in extension. Range of motion may be full. Plan to follow an ACL allograft rehab protocol. Initial goals include maintenance of full knee extension, regaining flexion, swelling control, and quadriceps activation exercises.  May wean the brace and crutches as soon as she demonstrates good quadriceps control over the knee.  Expected release for sports no earlier than 12 months following [place] sport cord testing for the allograft. DVT prophylaxis with ASA 81 mg twice daily x 2 weeks.      Medical Diagnosis from Referral:   Evaluation Date: 2020 S83.511A (ICD-10-CM) - Rupture of anterior cruciate ligament of right knee, initial encounter  Authorization Period Expiration: 20  Plan of Care Expiration: 20  Visit # / Visits authorized:    Time In: 1645  Time Out: 1745  Total Billable Time: 60 minutes    Precautions: Standard    Subjective     Pt reports: Pt state she is doing well. Feels extension is doing well. States she has been performing hep well. Working to wean of pain meds. Uses them mostly at night to sleep.  She was compliant with home exercise program.  Response to previous treatment: improved knee flexion  Functional change: improved ROM    Pain: 1-3/10  Location: right knee      Objective     R knee ROM   4-0-115    Ashli Eng received therapeutic exercises to develop strength, endurance, ROM and flexibility for 55 minutes includin/20 heel raises 3 x 10  Tempo mini squat 3 x 10, 2/2/2/x  Single leg stance 3 x 1 min max effort  Quad set towel underknee with SLR 2 x 10  SLR abd 2 x 10 with 10/10 hip circles  SLR EXT 2 x10 with 10/10 hip circles  Prone hamstring curls to tolerance  Heel slides 40 x  Seated 90 degree  isometric quad 10 x 5 seconds x2 rounds  Lunge isometrics 4 x 1 mins  LAQ 3 x 10 3 second hold  SAQ with quad set prep 2  X 10    Ashli Eng received NMES to quad for re education x 0 mins in full ext and hyper extension    Ashli Eng received hot pack for 0 minutes to 0.    Ashli Eng received cold pack for 0 minutes to 0.      Home Exercises Provided and Patient Education Provided     Education provided:   - issued hep    Written Home Exercises Provided: yes.  Exercises were reviewed and Ashli Eng was able to demonstrate them prior to the end of the session.  Ashli Eng demonstrated good  understanding of the education provided.     See EMR under Patient Instructions for exercises provided 5/29/2020.    Assessment     Extension lag is much improved. Pt is more confident with hep and walking. She is to start walking without crutches as tolerated.    Ashli Eng is progressing well towards her goals.   Pt prognosis is Excellent.     Pt will continue to benefit from skilled outpatient physical therapy to address the deficits listed in the problem list box on initial evaluation, provide pt/family education and to maximize pt's level of independence in the home and community environment.     Pt's spiritual, cultural and educational needs considered and pt agreeable to plan of care and goals.     Anticipated barriers to physical therapy: none    Goals: see eval    Plan     Cont per POC    Willy Novak, PT

## 2020-06-13 LAB — SARS-COV-2 RNA RESP QL NAA+PROBE: NOT DETECTED

## 2020-06-16 ENCOUNTER — CLINICAL SUPPORT (OUTPATIENT)
Dept: REHABILITATION | Facility: HOSPITAL | Age: 40
End: 2020-06-16
Payer: COMMERCIAL

## 2020-06-16 DIAGNOSIS — M25.561 ACUTE PAIN OF RIGHT KNEE: ICD-10-CM

## 2020-06-16 DIAGNOSIS — M62.551 ATROPHY OF MUSCLE OF RIGHT THIGH: ICD-10-CM

## 2020-06-16 DIAGNOSIS — R26.9 ABNORMAL GAIT: ICD-10-CM

## 2020-06-16 PROCEDURE — 97110 THERAPEUTIC EXERCISES: CPT | Performed by: PHYSICAL THERAPIST

## 2020-07-02 ENCOUNTER — CLINICAL SUPPORT (OUTPATIENT)
Dept: REHABILITATION | Facility: HOSPITAL | Age: 40
End: 2020-07-02
Payer: COMMERCIAL

## 2020-07-02 DIAGNOSIS — M62.551 ATROPHY OF MUSCLE OF RIGHT THIGH: ICD-10-CM

## 2020-07-02 DIAGNOSIS — M25.561 ACUTE PAIN OF RIGHT KNEE: ICD-10-CM

## 2020-07-02 DIAGNOSIS — R26.9 ABNORMAL GAIT: ICD-10-CM

## 2020-07-02 PROCEDURE — 97530 THERAPEUTIC ACTIVITIES: CPT | Performed by: PHYSICAL THERAPIST

## 2020-07-02 PROCEDURE — 97110 THERAPEUTIC EXERCISES: CPT | Performed by: PHYSICAL THERAPIST

## 2020-07-02 NOTE — PLAN OF CARE
Physical Therapy Progress Note     Name: Ashli Michaels  Clinic Number: [id number]    Therapy Diagnosis:   Encounter Diagnoses   Name Primary?    Acute pain of right knee     Abnormal gait     Atrophy of muscle of right thigh      Physician: Farhad Hess PA*    Visit Date: 7/2/2020     Patient will weight bear as tolerates with the brace locked in extension. Range of motion may be full. Plan to follow an ACL allograft rehab protocol. Initial goals include maintenance of full knee extension, regaining flexion, swelling control, and quadriceps activation exercises.  May wean the brace and crutches as soon as she demonstrates good quadriceps control over the knee.  Expected release for sports no earlier than 12 months following [place] sport cord testing for the allograft. DVT prophylaxis with ASA 81 mg twice daily x 2 weeks.      Medical Diagnosis from Referral:   Evaluation Date: 5/27/2020 S83.511A (ICD-10-CM) - Rupture of anterior cruciate ligament of right knee, initial encounter  Authorization Period Expiration: 12-31-20  Plan of Care Expiration: 11-30-20  Visit # / Visits authorized:7/ 20    Time In: 1000  Time Out: 1100  Total Billable Time: 60 minutes    Precautions: Standard     She is 5 weeks post op at this time.    Subjective     Pt reports: pt presents today walking independently with no brace. States she feels she is doing well. Not taking pain meds anymore. Perform hep regularly.Reports she feels about 35-40% improvement.  She was compliant with home exercise program.  Response to previous treatment: improved knee flexion  Functional change: improved ROM    Pain: 0-1/10  Location: right knee      Objective     Range of Motion(*=pain):   Knee AROM PROM   Right* 3-0-122 5-0-140   Left 5-0-135 7-0-155     Gait: independent, non antalgic, good knee extension at heel strike and mid stance    Ashli Eng received therapeutic exercises to develop strength, endurance, ROM and flexibility for  30 minutes including:  Heel raises on step  Step ups  Quad set towel underknee with SLR 2 x 10  Prone hamstring curls to tolerance  Heel slides 40 x    LAQ 3 x 10 3 second hold  Isometric knee ext at 90 flexion 20 reps 10 seconds  SAQ with quad set prep 2  X 10  Prone quad stretch  Supine h/s stretch    Ashli Eng received NMES to quad for re education x 0 mins in full ext and hyper extension    Ashli Eng received Dynamic Activities for strength, endurance, ROM, Function 30 mins including  Hip hinge prep  Dowel squat to 90/90 tempo 5/5/5/0 20 reps  Single leg stance 3 x 1 min max effort  Monster walks 2 laps orange  Short box step downs 2 x 10  Lunge holds with pulse 3 x 1 mins or to tolerance    Ashli Eng received cold pack for 0 minutes to R knee.      Home Exercises Provided and Patient Education Provided     Education provided:   - updated and sent in email    Written Home Exercises Provided: yes.  Exercises were reviewed and Ashli Eng was able to demonstrate them prior to the end of the session.  Ashli Eng demonstrated good  understanding of the education provided.     See EMR under Patient Instructions for exercises provided 5/29/2020.    Assessment     Pt continues to progress well. She has decreasing swelling, improving muscle tone, and improved gait. End range extension continues to progress. I can get full passive extension equal to well leg with some overpressure. Gravity assisted it is shy by 2 degrees. Flexion continues to progress well. She has good quad control but still lacks a good lock out. Swelling has decrease but is still a limiting factor. Full AROM expected in the next month along with increased functional activities.    Ashli Eng is progressing well towards her goals.   Pt prognosis is Excellent.     Pt will continue to benefit from skilled outpatient physical therapy to address the deficits listed in the problem list box on initial evaluation, provide pt/family  education and to maximize pt's level of independence in the home and community environment.     Pt's spiritual, cultural and educational needs considered and pt agreeable to plan of care and goals.     Anticipated barriers to physical therapy: none    Goals:   Short Term Goals: 10 weeks   1. Pt independent in initial hep- met  2. Decreased pain 0-3/10 with activities- met  3. ROM 5-0-135 or better-met  4. Pt amb independently without significant deviations- progressing  5. Pt strength  4/5 or better throughout RLE- progressing  6. Pt able to perform proper body weight squat without significant deviation- progressing  7. No appreciable swelling- not met     Long Term Goals: 24 weeks   1. Pt independent in d/c hep  2. Decreased pain 0/10  3. ROM 5-0-150 or better  4. Biodex strength test 85% or better  5. Pt able to perform straight line jogging without significant deviations.    Plan     Continue to work on full AROM, reduction in swelling, isolated and compound strengthening. Will biodex at the 12 week stefanie for baseline strength measurements as long as there are not complications. Will start jogging progressions once criteria has been met ( full knee ext with quad control, single leg bounce 30 seconds or greater without deviation, biodex strength 75% of well leg, MMT hip abd 4/5 or better).    Willy Novak, PT

## 2020-07-02 NOTE — PROGRESS NOTES
.    Physical Therapy Progress Note     Name: Ashli Michaels  Clinic Number: [id number]    Therapy Diagnosis:   Encounter Diagnoses   Name Primary?    Acute pain of right knee     Abnormal gait     Atrophy of muscle of right thigh      Physician: Farhad Hess PA*    Visit Date: 7/2/2020     Patient will weight bear as tolerates with the brace locked in extension. Range of motion may be full. Plan to follow an ACL allograft rehab protocol. Initial goals include maintenance of full knee extension, regaining flexion, swelling control, and quadriceps activation exercises.  May wean the brace and crutches as soon as she demonstrates good quadriceps control over the knee.  Expected release for sports no earlier than 12 months following [place] sport cord testing for the allograft. DVT prophylaxis with ASA 81 mg twice daily x 2 weeks.      Medical Diagnosis from Referral:   Evaluation Date: 5/27/2020 S83.511A (ICD-10-CM) - Rupture of anterior cruciate ligament of right knee, initial encounter  Authorization Period Expiration: 12-31-20  Plan of Care Expiration: 11-30-20  Visit # / Visits authorized:7/ 20    Time In: 1000  Time Out: 1100  Total Billable Time: 60 minutes    Precautions: Standard     She is 5 weeks post op at this time.    Subjective     Pt reports: pt presents today walking independently with no brace. States she feels she is doing well. Not taking pain meds anymore. Perform hep regularly.Reports she feels about 35-40% improvement.  She was compliant with home exercise program.  Response to previous treatment: improved knee flexion  Functional change: improved ROM    Pain: 0-1/10  Location: right knee      Objective     Range of Motion(*=pain):   Knee AROM PROM   Right* 3-0-122 5-0-140   Left 5-0-135 7-0-155     Gait: independent, non antalgic, good knee extension at heel strike and mid stance    Ashli Eng received therapeutic exercises to develop strength, endurance, ROM and flexibility  for 30 minutes including:  Heel raises on step  Step ups  Quad set towel underknee with SLR 2 x 10  Prone hamstring curls to tolerance  Heel slides 40 x    LAQ 3 x 10 3 second hold  Isometric knee ext at 90 flexion 20 reps 10 seconds  SAQ with quad set prep 2  X 10  Prone quad stretch  Supine h/s stretch    Ashli Eng received NMES to quad for re education x 0 mins in full ext and hyper extension    Ashli Eng received Dynamic Activities for strength, endurance, ROM, Function 30 mins including  Hip hinge prep  Dowel squat to 90/90 tempo 5/5/5/0 20 reps  Single leg stance 3 x 1 min max effort  Monster walks 2 laps orange  Short box step downs 2 x 10  Lunge holds with pulse 3 x 1 mins or to tolerance    Ashli Eng received cold pack for 0 minutes to R knee.      Home Exercises Provided and Patient Education Provided     Education provided:   - updated and sent in email    Written Home Exercises Provided: yes.  Exercises were reviewed and Ashli Eng was able to demonstrate them prior to the end of the session.  Ashli Eng demonstrated good  understanding of the education provided.     See EMR under Patient Instructions for exercises provided 5/29/2020.    Assessment     Pt continues to progress well. She has decreasing swelling, improving muscle tone, and improved gait. End range extension continues to progress. I can get full passive extension equal to well leg with some overpressure. Gravity assisted it is shy by 2 degrees. Flexion continues to progress well. She has good quad control but still lacks a good lock out. Swelling has decrease but is still a limiting factor. Full AROM expected in the next month along with increased functional activities.    Ashli Eng is progressing well towards her goals.   Pt prognosis is Excellent.     Pt will continue to benefit from skilled outpatient physical therapy to address the deficits listed in the problem list box on initial evaluation, provide  pt/family education and to maximize pt's level of independence in the home and community environment.     Pt's spiritual, cultural and educational needs considered and pt agreeable to plan of care and goals.     Anticipated barriers to physical therapy: none    Goals:   Short Term Goals: 10 weeks   1. Pt independent in initial hep- met  2. Decreased pain 0-3/10 with activities- met  3. ROM 5-0-135 or better-met  4. Pt amb independently without significant deviations- progressing  5. Pt strength  4/5 or better throughout RLE- progressing  6. Pt able to perform proper body weight squat without significant deviation- progressing  7. No appreciable swelling- not met     Long Term Goals: 24 weeks   1. Pt independent in d/c hep  2. Decreased pain 0/10  3. ROM 5-0-150 or better  4. Biodex strength test 85% or better  5. Pt able to perform straight line jogging without significant deviations.    Plan     Continue to work on full AROM, reduction in swelling, isolated and compound strengthening. Will biodex at the 12 week stefanie for baseline strength measurements as long as there are not complications. Will start jogging progressions once criteria has been met ( full knee ext with quad control, single leg bounce 30 seconds or greater without deviation, biodex strength 75% of well leg, MMT hip abd 4/5 or better).    Willy Novak, PT

## 2020-07-06 NOTE — PROGRESS NOTES
S:Ashli Michaels presents for post-operative evaluation.     DATE OF PROCEDURE: 5/25/2020   PROCEDURES PERFORMED:   Right knee arthroscopic all inside aseptic allograft ACL reconstruction (CPT 23216)    Ashli Michaels reports to be doing well 6wk s/p the above mentioned procedure. Denies fevers, chills, night sweats, chest pain, difficulty breathing, calf pain or tenderness. Going to PT 1x Week at the [place] location. She missed about two weeks of PT on vacation, but was dilligent doing her home exercises. Seeing good progress daily. Pain is generally good, occasional soreness and tightness on lateral thigh. Very rarely takes tylenol for pain control.  She notices most of her soreness after physical therapy.  She occasoinally wakes from sleep with pain but this is very rare. She is back to normal, no crutches or brace.      O:  Exam of the right knee shows well-healed incisions.  No effusion.  Quad atrophy present but activating well.  Full passive physiologic hyperextension, flexion to 130°.  Negative Lachman.  No significant tenderness.    RADIOGRAPHS:  Prior x-rays showed ACL reconstruction tunnels to be in an acceptable position.  No hardware complication    A/P:  Continue current physical therapy rehab program.  Closed-chain exercises for strengthening.  Discussed not cleared for cutting/pivoting or sports activity for an extended period.  9-12 months postop.  I will see her back in 6 weeks.

## 2020-07-07 ENCOUNTER — OFFICE VISIT (OUTPATIENT)
Dept: SPORTS MEDICINE | Facility: CLINIC | Age: 40
End: 2020-07-07
Payer: COMMERCIAL

## 2020-07-07 VITALS
BODY MASS INDEX: 20.89 KG/M2 | WEIGHT: 130 LBS | HEIGHT: 66 IN | DIASTOLIC BLOOD PRESSURE: 74 MMHG | HEART RATE: 97 BPM | SYSTOLIC BLOOD PRESSURE: 116 MMHG

## 2020-07-07 DIAGNOSIS — S83.511D RUPTURE OF ANTERIOR CRUCIATE LIGAMENT OF RIGHT KNEE, SUBSEQUENT ENCOUNTER: Primary | ICD-10-CM

## 2020-07-07 PROCEDURE — 99024 POSTOP FOLLOW-UP VISIT: CPT | Mod: S$GLB,,, | Performed by: ORTHOPAEDIC SURGERY

## 2020-07-07 PROCEDURE — 99024 PR POST-OP FOLLOW-UP VISIT: ICD-10-PCS | Mod: S$GLB,,, | Performed by: ORTHOPAEDIC SURGERY

## 2020-07-07 PROCEDURE — 99999 PR PBB SHADOW E&M-EST. PATIENT-LVL III: ICD-10-PCS | Mod: PBBFAC,,, | Performed by: ORTHOPAEDIC SURGERY

## 2020-07-07 PROCEDURE — 99999 PR PBB SHADOW E&M-EST. PATIENT-LVL III: CPT | Mod: PBBFAC,,, | Performed by: ORTHOPAEDIC SURGERY

## 2020-07-09 RX ORDER — SULFAMETHOXAZOLE AND TRIMETHOPRIM 800; 160 MG/1; MG/1
1 TABLET ORAL 2 TIMES DAILY
Qty: 14 TABLET | Refills: 0 | Status: SHIPPED | OUTPATIENT
Start: 2020-07-09 | End: 2021-01-27

## 2020-07-10 ENCOUNTER — CLINICAL SUPPORT (OUTPATIENT)
Dept: REHABILITATION | Facility: HOSPITAL | Age: 40
End: 2020-07-10
Payer: COMMERCIAL

## 2020-07-10 DIAGNOSIS — M25.561 ACUTE PAIN OF RIGHT KNEE: ICD-10-CM

## 2020-07-10 DIAGNOSIS — R26.9 ABNORMAL GAIT: ICD-10-CM

## 2020-07-10 DIAGNOSIS — M62.551 ATROPHY OF MUSCLE OF RIGHT THIGH: ICD-10-CM

## 2020-07-10 PROCEDURE — 97110 THERAPEUTIC EXERCISES: CPT | Mod: CQ

## 2020-07-10 NOTE — PROGRESS NOTES
.    Physical Therapy Progress Note     Name: Ashli Michaels  Clinic Number: [id number]    Therapy Diagnosis:   Encounter Diagnoses   Name Primary?    Acute pain of right knee     Abnormal gait     Atrophy of muscle of right thigh      Physician: Farhad Hess PA*    Visit Date: 7/10/2020     Patient will weight bear as tolerates with the brace locked in extension. Range of motion may be full. Plan to follow an ACL allograft rehab protocol. Initial goals include maintenance of full knee extension, regaining flexion, swelling control, and quadriceps activation exercises.  May wean the brace and crutches as soon as she demonstrates good quadriceps control over the knee.  Expected release for sports no earlier than 12 months following [place] sport cord testing for the allograft. DVT prophylaxis with ASA 81 mg twice daily x 2 weeks.      Medical Diagnosis from Referral:   Evaluation Date: 5/27/2020 S83.511A (ICD-10-CM) - Rupture of anterior cruciate ligament of right knee, initial encounter  Authorization Period Expiration: 12-31-20  Plan of Care Expiration: 11-30-20  Visit # / Visits authorized:8/ 20    Time In: 1515  Time Out: 1545  Total Billable Time: 30 minutes    Precautions: Standard     She is 5 weeks post op at this time.    Subjective   15' late for tx today    Pt reports: min ITB discomfort and min knee pain with squatting. Stated joined gym and to start Saturday.  She was compliant with home exercise program.  Response to previous treatment: improved knee flexion  Functional change: improved ROM    Pain: 0-1/10  Location: right knee      Objective     Range of Motion(*=pain):   Knee AROM PROM   Right* 3-0-122 5-0-140   Left 5-0-135 7-0-155     Gait: independent, non antalgic, good knee extension at heel strike and mid stance    Ashli Eng received therapeutic exercises to develop strength, endurance, ROM and flexibility for 30 minutes including:  Stationary bike for 5' for increase  "circulation and ROM  Heel raises on step 30x   Lateral Step ups L knee 30x   Quad set into hyperext 30x  Supine hamstring curls on sport ball tempo  3x/30"  Wall slide 0-60' 3x10  R LE SLS on foam 4x/30"  Monster walks 3 laps orange    Ashli Eng received Dynamic Activities for strength, endurance, ROM, Function 00 mins including  Hip hinge prep  Dowel squat to 90/90 tempo 5/5/5/0 20 reps  Single leg stance 3 x 1 min max effort  Monster walks 2 laps orange  Short box step downs 2 x 10  Lunge holds with pulse 3 x 1 mins or to tolerance    Ashli Eng received cold pack for 0 minutes to R knee.      Home Exercises Provided and Patient Education Provided     Education provided:   - updated and sent in email    Written Home Exercises Provided: yes.  Exercises were reviewed and Ashli Eng was able to demonstrate them prior to the end of the session.  Ashli Eng demonstrated good  understanding of the education provided.     See EMR under Patient Instructions for exercises provided 5/29/2020.    Assessment     Unable to progress through entire routine due to pt 15' late for tx. Pt tolerating tx and progression well. Improved muscle tone with quad set along with improved gait. End range extension continues to progress with hyperextension qs.  VC/TC for correcting form and technique. Continue progress with full AROM.     Ashli Eng is progressing well towards her goals.   Pt prognosis is Excellent.     Pt will continue to benefit from skilled outpatient physical therapy to address the deficits listed in the problem list box on initial evaluation, provide pt/family education and to maximize pt's level of independence in the home and community environment.     Pt's spiritual, cultural and educational needs considered and pt agreeable to plan of care and goals.     Anticipated barriers to physical therapy: none    Goals:   Short Term Goals: 10 weeks   1. Pt independent in initial hep- met  2. Decreased pain " 0-3/10 with activities- met  3. ROM 5-0-135 or better-met  4. Pt amb independently without significant deviations- progressing  5. Pt strength  4/5 or better throughout RLE- progressing  6. Pt able to perform proper body weight squat without significant deviation- progressing  7. No appreciable swelling- not met     Long Term Goals: 24 weeks   1. Pt independent in d/c hep  2. Decreased pain 0/10  3. ROM 5-0-150 or better  4. Biodex strength test 85% or better  5. Pt able to perform straight line jogging without significant deviations.    Plan     Continue to work on full AROM, reduction in swelling, isolated and compound strengthening. Will biodex at the 12 week stefanie for baseline strength measurements as long as there are not complications. Will start jogging progressions once criteria has been met ( full knee ext with quad control, single leg bounce 30 seconds or greater without deviation, biodex strength 75% of well leg, MMT hip abd 4/5 or better).    Stalin Brothers, PTA

## 2020-07-16 ENCOUNTER — OFFICE VISIT (OUTPATIENT)
Dept: SPORTS MEDICINE | Facility: CLINIC | Age: 40
End: 2020-07-16
Payer: COMMERCIAL

## 2020-07-16 VITALS
SYSTOLIC BLOOD PRESSURE: 81 MMHG | DIASTOLIC BLOOD PRESSURE: 58 MMHG | BODY MASS INDEX: 20.89 KG/M2 | HEART RATE: 86 BPM | WEIGHT: 130 LBS | HEIGHT: 66 IN

## 2020-07-16 DIAGNOSIS — S83.511D RUPTURE OF ANTERIOR CRUCIATE LIGAMENT OF RIGHT KNEE, SUBSEQUENT ENCOUNTER: Primary | ICD-10-CM

## 2020-07-16 DIAGNOSIS — Z09 SURGERY FOLLOW-UP EXAMINATION: ICD-10-CM

## 2020-07-16 PROCEDURE — 99999 PR PBB SHADOW E&M-EST. PATIENT-LVL III: CPT | Mod: PBBFAC,,, | Performed by: PHYSICIAN ASSISTANT

## 2020-07-16 PROCEDURE — 99024 PR POST-OP FOLLOW-UP VISIT: ICD-10-PCS | Mod: S$GLB,,, | Performed by: PHYSICIAN ASSISTANT

## 2020-07-16 PROCEDURE — 99999 PR PBB SHADOW E&M-EST. PATIENT-LVL III: ICD-10-PCS | Mod: PBBFAC,,, | Performed by: PHYSICIAN ASSISTANT

## 2020-07-16 PROCEDURE — 99024 POSTOP FOLLOW-UP VISIT: CPT | Mod: S$GLB,,, | Performed by: PHYSICIAN ASSISTANT

## 2020-07-16 NOTE — PROGRESS NOTES
S:Ashli Michaels presents for post-operative evaluation.     DATE OF PROCEDURE: 5/25/2020       PROCEDURES PERFORMED:   Right knee arthroscopic all inside aseptic allograft ACL reconstruction (CPT 38250)     Surgeon(s) and Role:     * IRLANDA Corral MD - Primary     * Patrick Garcia MD - Resident - Assisting    Ashli Michaels reports to be doing well 7wk s/p the above mentioned procedure. Denies fevers, chills, night sweats, chest pain, difficulty breathing, calf pain or tenderness. Going to PT 1x Week at the [place] location.  She is following up today for a wound check of the lateral incision.  At her previous visit she was prescribed Bactrim for 7 days.  She states she has been taking Bactrim for 6 days now.  She mentions that her knee feels much better, the incision looks much better, and feels like she has less pain after taking this medication. Pain is is doing well.  Very rarely takes tylenol for pain control.        O: The incisions are healing well, with focus over the lateral incision:  There are no signs of infection, based on patient's description erythema has significantly improved as she has very minimal erythema at all.   No tenderness over lateral incision today. No significant pain or unusual tenderness.  Active range of motion in clinic today was full extension with 130° flexion.      A/P:  Patient to finish prescription of Bactrim.  She is to continue her physical therapy per protocol.  Continue use of Tylenol as needed for pain.  Follow up in 5 weeks for 3 month follow-up.  Patient verbalized understanding.    POSTOPERATIVE PLAN: Patient will weight bear as tolerates with the brace locked in extension. Range of motion may be full. Plan to follow an ACL allograft rehab protocol. Initial goals include maintenance of full knee extension, regaining flexion, swelling control, and quadriceps activation exercises.  May wean the brace and crutches as soon as she demonstrates good  quadriceps control over the knee.  Expected release for sports no earlier than 12 months following [place] sport cord testing for the allograft. DVT prophylaxis with ASA 81 mg twice daily x 2 weeks.

## 2020-07-17 ENCOUNTER — CLINICAL SUPPORT (OUTPATIENT)
Dept: REHABILITATION | Facility: HOSPITAL | Age: 40
End: 2020-07-17
Payer: COMMERCIAL

## 2020-07-17 DIAGNOSIS — M25.561 ACUTE PAIN OF RIGHT KNEE: ICD-10-CM

## 2020-07-17 DIAGNOSIS — R26.9 ABNORMAL GAIT: ICD-10-CM

## 2020-07-17 DIAGNOSIS — M62.551 ATROPHY OF MUSCLE OF RIGHT THIGH: ICD-10-CM

## 2020-07-17 PROCEDURE — 97530 THERAPEUTIC ACTIVITIES: CPT | Performed by: PHYSICAL THERAPIST

## 2020-07-17 PROCEDURE — 97110 THERAPEUTIC EXERCISES: CPT | Performed by: PHYSICAL THERAPIST

## 2020-07-17 NOTE — PROGRESS NOTES
.    Physical Therapy Progress Note     Name: Ashli Michaels  Clinic Number: [id number]    Therapy Diagnosis:   Encounter Diagnoses   Name Primary?    Acute pain of right knee     Abnormal gait     Atrophy of muscle of right thigh      Physician: Farhad Hess PA*    Visit Date: 7/17/2020     Patient will weight bear as tolerates with the brace locked in extension. Range of motion may be full. Plan to follow an ACL allograft rehab protocol. Initial goals include maintenance of full knee extension, regaining flexion, swelling control, and quadriceps activation exercises.  May wean the brace and crutches as soon as she demonstrates good quadriceps control over the knee.  Expected release for sports no earlier than 12 months following [place] sport cord testing for the allograft. DVT prophylaxis with ASA 81 mg twice daily x 2 weeks.      Medical Diagnosis from Referral:   Evaluation Date: 5/27/2020 S83.511A (ICD-10-CM) - Rupture of anterior cruciate ligament of right knee, initial encounter  Authorization Period Expiration: 12-31-20  Plan of Care Expiration: 11-30-20  Visit # / Visits authorized:10/ 20    Time In: 1555  Time Out: 1700  Total Billable Time: 60 minutes    Precautions: Standard     She is7 weeks post op at this time.    Subjective       Pt reports: reports knee is doing well. Still has some mild swelling anterior knee and lateral incision site   She was compliant with home exercise program.  Response to previous treatment: improved knee flexion  Functional change: improved ROM    Pain: 0-1/10  Location: right knee      Objective       Gait: independent, non antalgic, good knee extension at heel strike and mid stance    Ashli Eng received therapeutic exercises to develop strength, endurance, ROM and flexibility for 30 minutes including:  Watt bike 10 mins level 6  H/s curls maroon 3 x 15  Leg press single leg 3 x 15 60 lbs 90 degrees  laq 5 lbs 3 x 15  SAQ to SLR 5 lbs 3 x 10        Ashli  Albertina received Dynamic Activities for strength, endurance, ROM, Function 30 mins including  Walking quad stretch  Walking knee pulls  Runner calf stretch  Reverse lunges 3 x 10  [place] lunges 3 x 10    Time includes education in hep and review    Ashli Eng received cold pack for 0 minutes to R knee.      Home Exercises Provided and Patient Education Provided     Education provided:   - updated and sent in email    Written Home Exercises Provided: yes.  Exercises were reviewed and Ashli Eng was able to demonstrate them prior to the end of the session.  Ashli Eng demonstrated good  understanding of the education provided.     See EMR under Patient Instructions for exercises provided 5/29/2020.    Assessment     ROM is progressing well. She gets good quad tone in hyperextended ranges. No increase in pain with new exercises. Pt states she will be going to the gym this week. Will reassess next visit and progress as tolerated.    Ashli Eng is progressing well towards her goals.   Pt prognosis is Excellent.     Pt will continue to benefit from skilled outpatient physical therapy to address the deficits listed in the problem list box on initial evaluation, provide pt/family education and to maximize pt's level of independence in the home and community environment.     Pt's spiritual, cultural and educational needs considered and pt agreeable to plan of care and goals.     Anticipated barriers to physical therapy: none    Goals:   Short Term Goals: 10 weeks   1. Pt independent in initial hep- met  2. Decreased pain 0-3/10 with activities- met  3. ROM 5-0-135 or better-met  4. Pt amb independently without significant deviations- progressing  5. Pt strength  4/5 or better throughout RLE- progressing  6. Pt able to perform proper body weight squat without significant deviation- progressing  7. No appreciable swelling- not met     Long Term Goals: 24 weeks   1. Pt independent in d/c hep  2. Decreased  pain 0/10  3. ROM 5-0-150 or better  4. Biodex strength test 85% or better  5. Pt able to perform straight line jogging without significant deviations.    Plan     Continue to work on full AROM, reduction in swelling, isolated and compound strengthening. Will biodex at the 12 week stefanie for baseline strength measurements as long as there are not complications. Will start jogging progressions once criteria has been met ( full knee ext with quad control, single leg bounce 30 seconds or greater without deviation, biodex strength 75% of well leg, MMT hip abd 4/5 or better).    Willy Novak, PT

## 2020-07-24 ENCOUNTER — CLINICAL SUPPORT (OUTPATIENT)
Dept: REHABILITATION | Facility: HOSPITAL | Age: 40
End: 2020-07-24
Payer: COMMERCIAL

## 2020-07-24 DIAGNOSIS — R26.9 ABNORMAL GAIT: ICD-10-CM

## 2020-07-24 DIAGNOSIS — M25.561 ACUTE PAIN OF RIGHT KNEE: ICD-10-CM

## 2020-07-24 DIAGNOSIS — M62.551 ATROPHY OF MUSCLE OF RIGHT THIGH: ICD-10-CM

## 2020-07-24 PROCEDURE — 97110 THERAPEUTIC EXERCISES: CPT | Performed by: PHYSICAL THERAPIST

## 2020-07-24 PROCEDURE — 97530 THERAPEUTIC ACTIVITIES: CPT | Performed by: PHYSICAL THERAPIST

## 2020-07-24 NOTE — PROGRESS NOTES
.    Physical Therapy Progress Note     Name: Ashli Michaels  Clinic Number: [id number]    Therapy Diagnosis:   Encounter Diagnoses   Name Primary?    Acute pain of right knee     Abnormal gait     Atrophy of muscle of right thigh      Physician: Farhad Hess PA*    Visit Date: 7/24/2020     Patient will weight bear as tolerates with the brace locked in extension. Range of motion may be full. Plan to follow an ACL allograft rehab protocol. Initial goals include maintenance of full knee extension, regaining flexion, swelling control, and quadriceps activation exercises.  May wean the brace and crutches as soon as she demonstrates good quadriceps control over the knee.  Expected release for sports no earlier than 12 months following [place] sport cord testing for the allograft. DVT prophylaxis with ASA 81 mg twice daily x 2 weeks.      Medical Diagnosis from Referral:   Evaluation Date: 5/27/2020 S83.511A (ICD-10-CM) - Rupture of anterior cruciate ligament of right knee, initial encounter  Authorization Period Expiration: 12-31-20  Plan of Care Expiration: 11-30-20  Visit # / Visits authorized:11/ 20    Time In: 0700  Time Out: 0810  Total Billable Time: 60minutes    Precautions: Standard     Sugery 5/25/20: 8 weeks 4 days post op    Subjective       Pt reports: states overall knee is doing well. Had one day of walking stairs for about 3 hours and states that Knee has been more sore since then. No acute loss of ROM, mild fluctuation in swelling.   She was compliant with home exercise program.  Response to previous treatment: improved knee flexion  Functional change: improved ROM    Pain: 2-3/10  Location: right knee      Objective     ROM  L knee 7-0-150, flexion taken in prone  R knee 7-0-150, flexion taken in prone    Gait: independent, non antalgic, good knee extension at heel strike and mid stance    Ashli Eng received therapeutic exercises to develop strength, endurance, ROM and flexibility for  30 minutes including:  Watt bike 10 mins level 6  H/s curls maroon 3 x 15  Leg press single leg 3 x 15 60 lbs 90 degrees- np  Hammer strength 3 x 10 10 lbs knee extension  QS to SLR with heel prop 2  10        Ashli Eng received Dynamic Activities for strength, endurance, ROM, Function 30 mins including  Walking quad stretch  Walking knee pulls  Runner calf stretch  Cable hip flexion 3 x 10  Cable hip abd 3 x 10  Box step ups 3 x 10  Knee cross body stretch  Standing couch stretch    Time includes education in hep and review    Ashli Eng received cold pack for 10 minutes to R knee.      Home Exercises Provided and Patient Education Provided     Education provided:   - updated hep    Written Home Exercises Provided: yes.  Exercises were reviewed and Ashli Eng was able to demonstrate them prior to the end of the session.  Ashli Eng demonstrated good  understanding of the education provided.     See EMR under Patient Instructions for exercises provided 5/29/2020.    Assessment     Great ROM, improved end range quad control. Shows good understanding of updated gym hep. Will cont 1 x week. Will focus treatment on clinic to functional activities.    Ashli Eng is progressing well towards her goals.   Pt prognosis is Excellent.     Pt will continue to benefit from skilled outpatient physical therapy to address the deficits listed in the problem list box on initial evaluation, provide pt/family education and to maximize pt's level of independence in the home and community environment.     Pt's spiritual, cultural and educational needs considered and pt agreeable to plan of care and goals.     Anticipated barriers to physical therapy: none    Goals:   Short Term Goals: 10 weeks   1. Pt independent in initial hep- met  2. Decreased pain 0-3/10 with activities- met  3. ROM 5-0-135 or better-met  4. Pt amb independently without significant deviations- progressing  5. Pt strength  4/5 or better throughout  RLE- progressing  6. Pt able to perform proper body weight squat without significant deviation- progressing  7. No appreciable swelling- not met     Long Term Goals: 24 weeks   1. Pt independent in d/c hep  2. Decreased pain 0/10  3. ROM 5-0-150 or better  4. Biodex strength test 85% or better  5. Pt able to perform straight line jogging without significant deviations.    Plan     Continue to work on full AROM, reduction in swelling, isolated and compound strengthening. Will biodex at the 12 week stefanie for baseline strength measurements as long as there are not complications. Will start jogging progressions once criteria has been met ( full knee ext with quad control, single leg bounce 30 seconds or greater without deviation, biodex strength 75% of well leg, MMT hip abd 4/5 or better).    Willy Novak, PT

## 2020-07-31 ENCOUNTER — CLINICAL SUPPORT (OUTPATIENT)
Dept: REHABILITATION | Facility: HOSPITAL | Age: 40
End: 2020-07-31
Payer: COMMERCIAL

## 2020-07-31 DIAGNOSIS — R26.9 ABNORMAL GAIT: ICD-10-CM

## 2020-07-31 DIAGNOSIS — M25.561 ACUTE PAIN OF RIGHT KNEE: ICD-10-CM

## 2020-07-31 DIAGNOSIS — M62.551 ATROPHY OF MUSCLE OF RIGHT THIGH: ICD-10-CM

## 2020-07-31 PROCEDURE — 97530 THERAPEUTIC ACTIVITIES: CPT | Performed by: PHYSICAL THERAPIST

## 2020-07-31 PROCEDURE — 97110 THERAPEUTIC EXERCISES: CPT | Performed by: PHYSICAL THERAPIST

## 2020-08-13 NOTE — PROGRESS NOTES
DATE OF PROCEDURE: 5/25/2020   PROCEDURES PERFORMED:   Right knee arthroscopic all inside aseptic allograft ACL reconstruction (CPT 69160)     Ashli Michaels reports to be doing well just over 3 months s/p the above mentioned procedure. Going to PT 1x Week at the [place] location which has been going well.   She mentions that her knee feels much better, the incision looks good. Pain is improving.  Very rarely takes tylenol for pain control. She still thinks she has strength to build in her quadriceps.     REVIEW OF SYSTEMS:   Constitution: Negative. Negative for chills, fever and night sweats.    Hematologic/Lymphatic: Negative for bleeding problem. Does not bruise/bleed easily.   Skin: Negative for dry skin, itching and rash.   Musculoskeletal: Negative for falls. Negative for right knee pain and muscle weakness.     All other review of symptoms were reviewed and found to be noncontributory.     PAST MEDICAL HISTORY:   Past Medical History:   Diagnosis Date    Premature ovarian failure      PAST SURGICAL HISTORY:   Past Surgical History:   Procedure Laterality Date    BONE GRAFT Right 5/25/2020    Procedure: BONE GRAFT- AUTOGRAFT;  Surgeon: IRLANDA Corral MD;  Location: [facility] OR;  Service: Orthopedics;  Laterality: Right;    RECONSTRUCTION OF LIGAMENT Right 5/25/2020    Procedure: RECONSTRUCTION, LIGAMENT, ANTERIOR CRUCIATE LIGAMENT;  Surgeon: IRLANDA Corral MD;  Location: [facility] OR;  Service: Orthopedics;  Laterality: Right;  Regional w/Catheter - Adductor    Pericapsular Injection: Clonidine/Epi/Ketorolac/Ropivacaine 30cc    REPAIR OF MENISCUS OF KNEE Right 5/25/2020    Procedure: REPAIR, MENISCUS, KNEE;  Surgeon: IRLANDA Corral MD;  Location: [facility] OR;  Service: Orthopedics;  Laterality: Right;  Regional w/Catheter - Adductor    Pericapsular Injection: Clonidine/Epi/Ketorolac/Ropivacaine 30cc     FAMILY HISTORY:   Family History   Problem Relation Age of Onset    Colon cancer Maternal Grandmother      Hypertension Neg Hx      labor Neg Hx      SOCIAL HISTORY:   Social History     Socioeconomic History    Marital status:      Spouse name: Not on file    Number of children: Not on file    Years of education: Not on file    Highest education level: Not on file   Occupational History    Not on file   Social Needs    Financial resource strain: Not on file    Food insecurity     Worry: Not on file     Inability: Not on file    Transportation needs     Medical: Not on file     Non-medical: Not on file   Tobacco Use    Smoking status: Never Smoker    Smokeless tobacco: Never Used   Substance and Sexual Activity    Alcohol use: Yes    Drug use: No    Sexual activity: Yes     Partners: Male     Birth control/protection: None   Lifestyle    Physical activity     Days per week: Not on file     Minutes per session: Not on file    Stress: Not on file   Relationships    Social connections     Talks on phone: Not on file     Gets together: Not on file     Attends Zoroastrianism service: Not on file     Active member of club or organization: Not on file     Attends meetings of clubs or organizations: Not on file     Relationship status: Not on file   Other Topics Concern    Not on file   Social History Narrative    Not on file     MEDICATIONS:     Current Outpatient Medications:     aspirin (ECOTRIN) 81 MG EC tablet, Take 1 tablet (81 mg total) by mouth 2 (two) times daily. for 14 days, Disp: 28 tablet, Rfl: 0    estradiol (VIVELLE-DOT) 0.1 mg/24 hr PTSW, Place 1 patch onto the skin twice a week., Disp: 8 patch, Rfl: 6    HYDROcodone-acetaminophen (NORCO) 5-325 mg per tablet, Take 1 tablet by mouth every 12 (twelve) hours as needed for Pain. Do not operate machinery or drive while taking this medication., Disp: 20 tablet, Rfl: 0    sulfamethoxazole-trimethoprim 800-160mg (BACTRIM DS) 800-160 mg Tab, Take 1 tablet by mouth 2 (two) times daily., Disp: 14 tablet, Rfl: 0    traMADoL (ULTRAM) 50  mg tablet, 1 tabs every 12 hours/PRN; do not drive or operate machinery while taking this medication, Disp: 20 tablet, Rfl: 0    Current Facility-Administered Medications:     levonorgestrel 20 mcg/24 hr (5 years) IUD 1 Intra Uterine Device, 1 Intra Uterine Device, Intrauterine, , Shanon Romo MD, 1 Intra Uterine Device at 01/17/19 1109    ALLERGIES:   Review of patient's allergies indicates:   Allergen Reactions    Erythromycin Other (See Comments) and Rash     As a child; hives      PHYSICAL EXAMINATION:  There were no vitals taken for this visit.  General: Well-developed well-nourished 40 y.o. femalein no acute distress   Cardiovascular: Regular rhythm by palpation of distal pulse, normal color and temperature, no concerning varicosities on symptomatic side   Lungs: No labored breathing or wheezing appreciated   Neuro: Alert and oriented ×3   Psychiatric: well oriented to person, place and time, demonstrates normal mood and affect   Skin: No rashes, lesions or ulcers, normal temperature, turgor, and texture on involved extremity    Ortho/SPM Exam  RLE: The incisions are well healed.  There are no signs of infection.  No swelling or effusion.  No tenderness over lateral incision today.  There is some mild hypertrophic scar deep to the skin level over that area.  Related to previous ITB band closure.  Full active range of motion of the knee without pain.  Negative Lachman.  Quad atrophy present.    IMAGING:  No new imaging.    ASSESSMENT:      ICD-10-CM ICD-9-CM   1. Quadriceps weakness  M62.81 728.87     PLAN:     The patient is making good progress.  Continue to work on quad strengthening and closed-chain exercises. No cutting, pivoting activities until 10-12 months s/p surgery.  May begin a return to running program in 4 weeks. Knee compression sleeve given.  Return to clinic in 3 months.  All questions answered.  The patient is doing well.    Procedures

## 2020-08-14 ENCOUNTER — CLINICAL SUPPORT (OUTPATIENT)
Dept: REHABILITATION | Facility: HOSPITAL | Age: 40
End: 2020-08-14
Payer: COMMERCIAL

## 2020-08-14 DIAGNOSIS — R26.9 ABNORMAL GAIT: ICD-10-CM

## 2020-08-14 DIAGNOSIS — M62.551 ATROPHY OF MUSCLE OF RIGHT THIGH: ICD-10-CM

## 2020-08-14 DIAGNOSIS — M25.561 ACUTE PAIN OF RIGHT KNEE: ICD-10-CM

## 2020-08-14 PROCEDURE — 97530 THERAPEUTIC ACTIVITIES: CPT | Performed by: PHYSICAL THERAPIST

## 2020-08-14 PROCEDURE — 97110 THERAPEUTIC EXERCISES: CPT | Performed by: PHYSICAL THERAPIST

## 2020-08-14 NOTE — PROGRESS NOTES
.    Physical Therapy Progress Note     Name: Ashli Michaels  Clinic Number: [id number]    Therapy Diagnosis:   Encounter Diagnoses   Name Primary?    Acute pain of right knee     Abnormal gait     Atrophy of muscle of right thigh      Physician: Farhad Hess PA*    Visit Date: 8/14/2020     Patient will weight bear as tolerates with the brace locked in extension. Range of motion may be full. Plan to follow an ACL allograft rehab protocol. Initial goals include maintenance of full knee extension, regaining flexion, swelling control, and quadriceps activation exercises.  May wean the brace and crutches as soon as she demonstrates good quadriceps control over the knee.  Expected release for sports no earlier than 12 months following [place] sport cord testing for the allograft. DVT prophylaxis with ASA 81 mg twice daily x 2 weeks.      Medical Diagnosis from Referral:   Evaluation Date: 5/27/2020 S83.511A (ICD-10-CM) - Rupture of anterior cruciate ligament of right knee, initial encounter  Authorization Period Expiration: 12-31-20  Plan of Care Expiration: 11-30-20  Visit # / Visits authorized:13/ 20    Time In: 0700  Time Out: 0815  Total Billable Time: 60 minutes    Precautions: Standard     Sugery 5/25/20: 11 weeks 4 days post op    Subjective       Pt reports: states she is doing well, performing hep. Little to no swelling. No swelling after exercise.    She was compliant with home exercise program.  Response to previous treatment: improved knee flexion  Functional change: improved ROM    Pain: 0/10  Location: right knee      Objective     ROM  L knee 7-0-150, flexion taken in prone  R knee 7-0-150, flexion taken in prone    Gait: independent, non antalgic, good knee extension at heel strike and mid stance    Ashli Eng received therapeutic exercises to develop strength, endurance, ROM and flexibility for 30 minutes including:  Elliptical 10 mins  H/s curls maroon 3 x 15-np  Leg press single leg  3 x 15 60 lbs 90 degrees- np  Hammer strength 3 x 10 10 lbs knee extension-np  Hammer strength 3 x 10 5 lbs knee flexion-np  QS to SLR with heel prop 2 x 10    Biodex training 180deg/sec 5 x 10 each leg          Ashli Eng received Dynamic Activities for strength, endurance, ROM, Function 30 mins including  Walking quad stretch  Walking knee pulls  Runner calf stretch  Latera walking  Walking carioca  Walking lateral lunge  Backwards lunge  Cable hip flexion 3 x 10np  Cable hip abd 3 x 10np  Box step ups 3 x 10 np  Knee cross body stretch  Standing couch stretch  90/90 squat 3 x10 10 lbs plate-np    Single leg squat 4 x 10    Time includes education in hep and review    Ashli Eng received cold pack for0 minutes to R knee.      Home Exercises Provided and Patient Education Provided     Education provided:   - updated hep    Written Home Exercises Provided: yes.  Exercises were reviewed and Ashli Eng was able to demonstrate them prior to the end of the session.  Ashli Eng demonstrated good  understanding of the education provided.     See EMR under Patient Instructions for exercises provided 5/29/2020.    Assessment     Progressing well. Swelling is limiting factor in full extension but should progress well. Excellent quad control.    Ashli Eng is progressing well towards her goals.   Pt prognosis is Excellent.     Pt will continue to benefit from skilled outpatient physical therapy to address the deficits listed in the problem list box on initial evaluation, provide pt/family education and to maximize pt's level of independence in the home and community environment.     Pt's spiritual, cultural and educational needs considered and pt agreeable to plan of care and goals.     Anticipated barriers to physical therapy: none    Goals:   Short Term Goals: 10 weeks   1. Pt independent in initial hep- met  2. Decreased pain 0-3/10 with activities- met  3. ROM 5-0-135 or better-met  4. Pt amb  independently without significant deviations- progressing  5. Pt strength  4/5 or better throughout RLE- progressing  6. Pt able to perform proper body weight squat without significant deviation- progressing  7. No appreciable swelling- not met     Long Term Goals: 24 weeks   1. Pt independent in d/c hep  2. Decreased pain 0/10  3. ROM 5-0-150 or better  4. Biodex strength test 85% or better  5. Pt able to perform straight line jogging without significant deviations.    Plan     Continue to work on full AROM, reduction in swelling, isolated and compound strengthening. Will biodex at the 12 week stefanie for baseline strength measurements as long as there are not complications. Will start jogging progressions once criteria has been met ( full knee ext with quad control, single leg bounce 30 seconds or greater without deviation, biodex strength 75% of well leg, MMT hip abd 4/5 or better).    Willy Novak, PT

## 2020-08-18 ENCOUNTER — OFFICE VISIT (OUTPATIENT)
Dept: SPORTS MEDICINE | Facility: CLINIC | Age: 40
End: 2020-08-18
Payer: COMMERCIAL

## 2020-08-18 VITALS
SYSTOLIC BLOOD PRESSURE: 111 MMHG | HEIGHT: 66 IN | BODY MASS INDEX: 20.89 KG/M2 | HEART RATE: 90 BPM | DIASTOLIC BLOOD PRESSURE: 73 MMHG | WEIGHT: 130 LBS

## 2020-08-18 DIAGNOSIS — M62.81 QUADRICEPS WEAKNESS: Primary | ICD-10-CM

## 2020-08-18 PROCEDURE — 99213 OFFICE O/P EST LOW 20 MIN: CPT | Mod: 24,S$GLB,, | Performed by: ORTHOPAEDIC SURGERY

## 2020-08-18 PROCEDURE — 3008F BODY MASS INDEX DOCD: CPT | Mod: CPTII,S$GLB,, | Performed by: ORTHOPAEDIC SURGERY

## 2020-08-18 PROCEDURE — 99999 PR PBB SHADOW E&M-EST. PATIENT-LVL III: CPT | Mod: PBBFAC,,, | Performed by: ORTHOPAEDIC SURGERY

## 2020-08-18 PROCEDURE — 99999 PR PBB SHADOW E&M-EST. PATIENT-LVL III: ICD-10-PCS | Mod: PBBFAC,,, | Performed by: ORTHOPAEDIC SURGERY

## 2020-08-18 PROCEDURE — 99213 PR OFFICE/OUTPT VISIT, EST, LEVL III, 20-29 MIN: ICD-10-PCS | Mod: 24,S$GLB,, | Performed by: ORTHOPAEDIC SURGERY

## 2020-08-18 PROCEDURE — 3008F PR BODY MASS INDEX (BMI) DOCUMENTED: ICD-10-PCS | Mod: CPTII,S$GLB,, | Performed by: ORTHOPAEDIC SURGERY

## 2020-08-19 ENCOUNTER — OFFICE VISIT (OUTPATIENT)
Dept: URGENT CARE | Facility: CLINIC | Age: 40
End: 2020-08-19
Payer: COMMERCIAL

## 2020-08-19 VITALS
HEART RATE: 91 BPM | TEMPERATURE: 99 F | HEIGHT: 66 IN | WEIGHT: 130 LBS | RESPIRATION RATE: 19 BRPM | SYSTOLIC BLOOD PRESSURE: 103 MMHG | DIASTOLIC BLOOD PRESSURE: 70 MMHG | OXYGEN SATURATION: 97 % | BODY MASS INDEX: 20.89 KG/M2

## 2020-08-19 DIAGNOSIS — Z11.59 SCREENING FOR VIRAL DISEASE: ICD-10-CM

## 2020-08-19 DIAGNOSIS — R51.9 NONINTRACTABLE EPISODIC HEADACHE, UNSPECIFIED HEADACHE TYPE: Primary | ICD-10-CM

## 2020-08-19 DIAGNOSIS — J02.9 SORE THROAT: ICD-10-CM

## 2020-08-19 PROCEDURE — 99213 OFFICE O/P EST LOW 20 MIN: CPT | Mod: S$GLB,,, | Performed by: PHYSICIAN ASSISTANT

## 2020-08-19 PROCEDURE — 99213 PR OFFICE/OUTPT VISIT, EST, LEVL III, 20-29 MIN: ICD-10-PCS | Mod: S$GLB,,, | Performed by: PHYSICIAN ASSISTANT

## 2020-08-19 PROCEDURE — U0003 INFECTIOUS AGENT DETECTION BY NUCLEIC ACID (DNA OR RNA); SEVERE ACUTE RESPIRATORY SYNDROME CORONAVIRUS 2 (SARS-COV-2) (CORONAVIRUS DISEASE [COVID-19]), AMPLIFIED PROBE TECHNIQUE, MAKING USE OF HIGH THROUGHPUT TECHNOLOGIES AS DESCRIBED BY CMS-2020-01-R: HCPCS

## 2020-08-19 NOTE — PATIENT INSTRUCTIONS
- Rest.    - Drink plenty of fluids.    - Acetaminophen (tylenol) or Ibuprofen (advil,motrin) as directed as needed for fever/pain. Avoid tylenol if you have a history of liver disease. Do not take ibuprofen if you have a history of GI bleeding, kidney disease, or if you take blood thinners.     - Follow up with your PCP or specialty clinic as directed in the next 1-2 weeks if not improved or as needed.  You can call [phone] to schedule an appointment with the appropriate provider.    - Go to the ER or seek medical attention immediately if you develop new or worsening symptoms.    - You must understand that you have received an Urgent Care treatment only and that you may be released before all of your medical problems are known or treated.   - You, the patient, will arrange for follow up care as instructed.   - If your condition worsens or fails to improve we recommend that you receive another evaluation at the ER immediately or contact your PCP to discuss your concerns or return here.       Self-Care for Headaches  Most headaches aren't serious and can be relieved with self-care. But some headaches may be a sign of another health problem like eye trouble or high blood pressure. To find the best treatment, learn what kind of headaches you get. For tension headaches, self-care will usually help. To treat migraines, ask your healthcare provider for advice. It is also possible to get both tension and migraine headaches. Self-care involves relieving the pain and avoiding headache triggers if you can.    Ways to reduce pain and tension  Try these steps:  · Apply a cold compress or ice pack to the pain site.  · Drink fluids. If nausea makes it hard to drink, try sucking on ice.  · Rest. Protect yourself from bright light and loud noises.  · Calm your emotions by imagining a peaceful scene.  · Massage tight neck, shoulder, and head muscles.  · To relax muscles, soak in a hot bath or use a hot shower.  Use  "medicines  Aspirin or aspirin substitutes, such as ibuprofen and acetaminophen, can relieve headache. Remember: Never give aspirin to anyone 18 years old or younger because of the risk of developing Reye syndrome. Use pain medicines only when necessary.  Track your headaches  Keeping a headache diary can help you and your healthcare provider identify what's causing your headaches:  · Note when each headache happens.  · Identify your activities and the foods you've eaten 6 to 8 hours before the headache began.  · Look for any trends or "triggers."  Signs of tension headache  Any of the following can be signs:  · Dull pain or feeling of pressure in a tight band around your head  · Pain in your neck or shoulders  · Headache without a definite beginning or end  · Headache after an activity such as driving or working on a computer  Signs of migraine  Any of the following can be signs:  · Throbbing pain on one or both sides of your head  · Nausea or vomiting  · Extreme sensitivity to light, sound, and smells  · Bright spots, flashes, or other visual changes  · Pain or nausea so severe that you can't continue your daily activities  Call your healthcare provider   If you have any of the following symptoms, contact your healthcare provider:  · A headache that lingers after a recent injury or bump to the head.  · A fever with a stiff neck or pain when you bend your head toward your chest.  · A headache along with slurred speech, changes in your vision, or numbness or weakness in your arms or legs.  · A headache for longer than 3 days.  · Frequent headaches, especially in the morning.  · Headaches with seizures   · Seek immediate medical attention if you have a headache that you would call "the worst headache you have ever had."   Date Last Reviewed: 10/4/2015  © 2907-9450 [employer]. [address] [place] [address], [place] [address]. All rights reserved. This information is not intended as a substitute for professional " medical care. Always follow your healthcare professional's instructions.        Self-Care for Sore Throats    Sore throats happen for many reasons, such as colds, allergies, and infections caused by viruses or bacteria. In any case, your throat becomes red and sore. Your goal for self-care is to reduce your discomfort while giving your throat a chance to heal.  Moisten and soothe your throat  Tips include the following:  · Try a sip of water first thing after waking up.  · Keep your throat moist by drinking 6 or more glasses of clear liquids every day.  · Run a cool-air humidifier in your room overnight.  · Avoid cigarette smoke.   · Suck on throat lozenges, cough drops, hard candy, ice chips, or frozen fruit-juice bars. Use the sugar-free versions if your diet or medical condition requires them.  Gargle to ease irritation  Gargling every hour or 2 can ease irritation. Try gargling with 1 of these solutions:  · 1/4 teaspoon of salt in 1/2 cup of warm water  · An over-the-counter anesthetic gargle  Use medicine for more relief  Over-the-counter medicine can reduce sore throat symptoms. Ask your pharmacist if you have questions about which medicine to use:  · Ease pain with anesthetic sprays. Aspirin or an aspirin substitute also helps. Remember, never give aspirin to anyone 18 or younger, or if you are already taking blood thinners.   · For sore throats caused by allergies, try antihistamines to block the allergic reaction.  · Remember: unless a sore throat is caused by a bacterial infection, antibiotics wont help you.  Prevent future sore throats  Prevention tips include the following:  · Stop smoking or reduce contact with secondhand smoke. Smoke irritates the tender throat lining.  · Limit contact with pets and with allergy-causing substances, such as pollen and mold.  · When youre around someone with a sore throat or cold, wash your hands often to keep viruses or bacteria from spreading.  · Dont strain your  vocal cords.  Call your healthcare provider  Contact your healthcare provider if you have:  · A temperature over 101°F (38.3°C)  · White spots on the throat  · Great difficulty swallowing  · Trouble breathing  · A skin rash  · Recent exposure to someone else with strep bacteria  · Severe hoarseness and swollen glands in the neck or jaw   Date Last Reviewed: 8/1/2016 © 2000-2017 [employer]. [address] [place] [address], [place] [address]. All rights reserved. This information is not intended as a substitute for professional medical care. Always follow your healthcare professional's instructions.

## 2020-08-19 NOTE — PROGRESS NOTES
"Subjective:       Patient ID: Ashli Michaels is a 40 y.o. female.    Vitals:  height is 5' 6" (1.676 m) and weight is 59 kg (130 lb). Her temperature is 98.6 °F (37 °C). Her blood pressure is 103/70 and her pulse is 91. Her respiration is 19 and oxygen saturation is 97%.     Chief Complaint: Sore Throat    Patient presents requesting COVID swab.  She states that yesterday she began developing a scratchy throat and felt a bit more run down than usual.  She states that last night she also had a headache, relieved with ibuprofen, and this morning she also had another headache, which resolved with ibuprofen.  She had mild nausea associated with the headache, which resolved.  She does have a history of migraine headaches.  Denies fever or body aches.  She states that she coughed a few times but no persistent cough.  Denies abdominal pain, vomiting, or diarrhea.  No difficulty breathing or chest pain.  No nasal congestion.  Sore throat severity 2/10, very mild.  She states that symptoms are very mild, but with her daughter having URI symptoms, she wants to get COVID test.  Patient does not get regular menstrual period, has IUD.    Sore Throat   This is a new problem. The current episode started yesterday. The problem has been unchanged. There has been no fever. The pain is at a severity of 2/10. The pain is mild. Associated symptoms include coughing and headaches. Pertinent negatives include no abdominal pain, congestion, diarrhea, neck pain, shortness of breath, trouble swallowing or vomiting. She has had no exposure to strep or mono. She has tried acetaminophen for the symptoms. The treatment provided mild relief.       Constitution: Positive for fatigue. Negative for chills, sweating, fever and unexpected weight change.   HENT: Positive for sore throat. Negative for congestion, foreign body in nose, postnasal drip, sinus pain, sinus pressure and trouble swallowing.    Neck: Negative for neck pain, neck stiffness, " painful lymph nodes and neck swelling.   Cardiovascular: Negative for chest pain, leg swelling, palpitations and sob on exertion.   Eyes: Negative for double vision and blurred vision.   Respiratory: Positive for cough. Negative for chest tightness, sputum production, bloody sputum, COPD and shortness of breath.    Gastrointestinal: Positive for nausea. Negative for abdominal pain, abdominal bloating, vomiting and diarrhea.   Genitourinary: Negative for dysuria, frequency, urgency and history of kidney stones.   Musculoskeletal: Negative for pain, joint pain, joint swelling, muscle cramps and muscle ache.   Skin: Negative for color change, pale, rash and bruising.   Allergic/Immunologic: Negative for seasonal allergies.   Neurological: Positive for headaches. Negative for dizziness, history of vertigo, light-headedness, passing out, facial drooping, speech difficulty, coordination disturbances, loss of balance, history of migraines, disorientation, altered mental status, loss of consciousness, numbness, tingling, seizures and tremors.   Hematologic/Lymphatic: Negative for swollen lymph nodes.   Psychiatric/Behavioral: Negative for altered mental status, disorientation, nervous/anxious, sleep disturbance and depression. The patient is not nervous/anxious.        Objective:      Physical Exam   Constitutional: She is oriented to person, place, and time. She appears well-developed. She is cooperative.  Non-toxic appearance. She does not appear ill. No distress.      Comments:Patient sitting comfortably in no acute distress.  Nontoxic appearing.   HENT:   Head: Normocephalic and atraumatic.   Ears:   Right Ear: Hearing, tympanic membrane, external ear and ear canal normal.   Left Ear: Hearing, tympanic membrane, external ear and ear canal normal.   Nose: Nose normal. No mucosal edema, rhinorrhea or nasal deformity. No epistaxis. Right sinus exhibits no maxillary sinus tenderness and no frontal sinus tenderness. Left  sinus exhibits no maxillary sinus tenderness and no frontal sinus tenderness.   Mouth/Throat: Uvula is midline and mucous membranes are normal. No trismus in the jaw. Normal dentition. No uvula swelling. Posterior oropharyngeal erythema present. No oropharyngeal exudate, posterior oropharyngeal edema, tonsillar abscesses or cobblestoning. Tonsils are 1+ on the right. Tonsils are 1+ on the left. No tonsillar exudate.   Mild erythema.  No significant tonsillitis or posterior or pharyngeal swelling/exudate.  No lymphadenopathy present.      Comments: Mild erythema.  No significant tonsillitis or posterior or pharyngeal swelling/exudate.  No lymphadenopathy present.  Eyes: Pupils are equal, round, and reactive to light. Conjunctivae, EOM and lids are normal. No scleral icterus.   Neck: Trachea normal, normal range of motion, full passive range of motion without pain and phonation normal. Neck supple. No neck rigidity. No edema and no erythema present.   Cardiovascular: Normal rate, regular rhythm, normal heart sounds and normal pulses.   Pulmonary/Chest: Effort normal and breath sounds normal. No accessory muscle usage or stridor. No tachypnea and no bradypnea. No respiratory distress. She has no decreased breath sounds. She has no wheezes. She has no rhonchi. She has no rales.   Abdominal: Soft. Normal appearance and bowel sounds are normal. She exhibits no distension. There is no abdominal tenderness.   Musculoskeletal: Normal range of motion.         General: No deformity.   Lymphadenopathy:        Head (right side): No submandibular, no preauricular and no posterior auricular adenopathy present.        Head (left side): No submandibular, no preauricular and no posterior auricular adenopathy present.     She has no cervical adenopathy.   Neurological: She is alert and oriented to person, place, and time. No cranial nerve deficit. She exhibits normal muscle tone. Coordination normal.   Skin: Skin is warm, dry, intact,  not diaphoretic and not pale. Psychiatric: Her speech is normal and behavior is normal. Judgment and thought content normal.   Nursing note and vitals reviewed.        Assessment:       1. Nonintractable episodic headache, unspecified headache type    2. Sore throat    3. Screening for viral disease        Plan:         Nonintractable episodic headache, unspecified headache type  -     COVID-19 Routine Screening    Sore throat  -     COVID-19 Routine Screening    Screening for viral disease  -     COVID-19 Routine Screening      Patient Instructions     - Rest.    - Drink plenty of fluids.    - Acetaminophen (tylenol) or Ibuprofen (advil,motrin) as directed as needed for fever/pain. Avoid tylenol if you have a history of liver disease. Do not take ibuprofen if you have a history of GI bleeding, kidney disease, or if you take blood thinners.     - Follow up with your PCP or specialty clinic as directed in the next 1-2 weeks if not improved or as needed.  You can call [phone] to schedule an appointment with the appropriate provider.    - Go to the ER or seek medical attention immediately if you develop new or worsening symptoms.    - You must understand that you have received an Urgent Care treatment only and that you may be released before all of your medical problems are known or treated.   - You, the patient, will arrange for follow up care as instructed.   - If your condition worsens or fails to improve we recommend that you receive another evaluation at the ER immediately or contact your PCP to discuss your concerns or return here.       Self-Care for Headaches  Most headaches aren't serious and can be relieved with self-care. But some headaches may be a sign of another health problem like eye trouble or high blood pressure. To find the best treatment, learn what kind of headaches you get. For tension headaches, self-care will usually help. To treat migraines, ask your healthcare provider for advice. It is  "also possible to get both tension and migraine headaches. Self-care involves relieving the pain and avoiding headache triggers if you can.    Ways to reduce pain and tension  Try these steps:  · Apply a cold compress or ice pack to the pain site.  · Drink fluids. If nausea makes it hard to drink, try sucking on ice.  · Rest. Protect yourself from bright light and loud noises.  · Calm your emotions by imagining a peaceful scene.  · Massage tight neck, shoulder, and head muscles.  · To relax muscles, soak in a hot bath or use a hot shower.  Use medicines  Aspirin or aspirin substitutes, such as ibuprofen and acetaminophen, can relieve headache. Remember: Never give aspirin to anyone 18 years old or younger because of the risk of developing Reye syndrome. Use pain medicines only when necessary.  Track your headaches  Keeping a headache diary can help you and your healthcare provider identify what's causing your headaches:  · Note when each headache happens.  · Identify your activities and the foods you've eaten 6 to 8 hours before the headache began.  · Look for any trends or "triggers."  Signs of tension headache  Any of the following can be signs:  · Dull pain or feeling of pressure in a tight band around your head  · Pain in your neck or shoulders  · Headache without a definite beginning or end  · Headache after an activity such as driving or working on a computer  Signs of migraine  Any of the following can be signs:  · Throbbing pain on one or both sides of your head  · Nausea or vomiting  · Extreme sensitivity to light, sound, and smells  · Bright spots, flashes, or other visual changes  · Pain or nausea so severe that you can't continue your daily activities  Call your healthcare provider   If you have any of the following symptoms, contact your healthcare provider:  · A headache that lingers after a recent injury or bump to the head.  · A fever with a stiff neck or pain when you bend your head toward your " "chest.  · A headache along with slurred speech, changes in your vision, or numbness or weakness in your arms or legs.  · A headache for longer than 3 days.  · Frequent headaches, especially in the morning.  · Headaches with seizures   · Seek immediate medical attention if you have a headache that you would call "the worst headache you have ever had."   Date Last Reviewed: 10/4/2015  © 4492-6314 [employer]. [address] [place] [address], [place] [address]. All rights reserved. This information is not intended as a substitute for professional medical care. Always follow your healthcare professional's instructions.        Self-Care for Sore Throats    Sore throats happen for many reasons, such as colds, allergies, and infections caused by viruses or bacteria. In any case, your throat becomes red and sore. Your goal for self-care is to reduce your discomfort while giving your throat a chance to heal.  Moisten and soothe your throat  Tips include the following:  · Try a sip of water first thing after waking up.  · Keep your throat moist by drinking 6 or more glasses of clear liquids every day.  · Run a cool-air humidifier in your room overnight.  · Avoid cigarette smoke.   · Suck on throat lozenges, cough drops, hard candy, ice chips, or frozen fruit-juice bars. Use the sugar-free versions if your diet or medical condition requires them.  Gargle to ease irritation  Gargling every hour or 2 can ease irritation. Try gargling with 1 of these solutions:  · 1/4 teaspoon of salt in 1/2 cup of warm water  · An over-the-counter anesthetic gargle  Use medicine for more relief  Over-the-counter medicine can reduce sore throat symptoms. Ask your pharmacist if you have questions about which medicine to use:  · Ease pain with anesthetic sprays. Aspirin or an aspirin substitute also helps. Remember, never give aspirin to anyone 18 or younger, or if you are already taking blood thinners.   · For sore throats caused by " allergies, try antihistamines to block the allergic reaction.  · Remember: unless a sore throat is caused by a bacterial infection, antibiotics wont help you.  Prevent future sore throats  Prevention tips include the following:  · Stop smoking or reduce contact with secondhand smoke. Smoke irritates the tender throat lining.  · Limit contact with pets and with allergy-causing substances, such as pollen and mold.  · When youre around someone with a sore throat or cold, wash your hands often to keep viruses or bacteria from spreading.  · Dont strain your vocal cords.  Call your healthcare provider  Contact your healthcare provider if you have:  · A temperature over 101°F (38.3°C)  · White spots on the throat  · Great difficulty swallowing  · Trouble breathing  · A skin rash  · Recent exposure to someone else with strep bacteria  · Severe hoarseness and swollen glands in the neck or jaw   Date Last Reviewed: 8/1/2016  © 9289-5126 [employer]. [address] [place] [address], [place] [address]. All rights reserved. This information is not intended as a substitute for professional medical care. Always follow your healthcare professional's instructions.

## 2020-08-20 LAB — SARS-COV-2 RNA RESP QL NAA+PROBE: NOT DETECTED

## 2020-08-28 ENCOUNTER — CLINICAL SUPPORT (OUTPATIENT)
Dept: REHABILITATION | Facility: HOSPITAL | Age: 40
End: 2020-08-28
Payer: COMMERCIAL

## 2020-08-28 DIAGNOSIS — M62.551 ATROPHY OF MUSCLE OF RIGHT THIGH: ICD-10-CM

## 2020-08-28 DIAGNOSIS — M25.561 ACUTE PAIN OF RIGHT KNEE: ICD-10-CM

## 2020-08-28 DIAGNOSIS — R26.9 ABNORMAL GAIT: ICD-10-CM

## 2020-08-28 PROCEDURE — 97530 THERAPEUTIC ACTIVITIES: CPT | Performed by: PHYSICAL THERAPIST

## 2020-08-28 PROCEDURE — 97110 THERAPEUTIC EXERCISES: CPT | Performed by: PHYSICAL THERAPIST

## 2020-08-28 NOTE — PROGRESS NOTES
.    Physical Therapy Progress Note     Name: Ashli Michaels  Clinic Number: [id number]    Therapy Diagnosis:   Encounter Diagnoses   Name Primary?    Acute pain of right knee     Abnormal gait     Atrophy of muscle of right thigh      Physician: Farhad Hess PA*    Visit Date: 8/28/2020     Patient will weight bear as tolerates with the brace locked in extension. Range of motion may be full. Plan to follow an ACL allograft rehab protocol. Initial goals include maintenance of full knee extension, regaining flexion, swelling control, and quadriceps activation exercises.  May wean the brace and crutches as soon as she demonstrates good quadriceps control over the knee.  Expected release for sports no earlier than 12 months following [place] sport cord testing for the allograft. DVT prophylaxis with ASA 81 mg twice daily x 2 weeks.      Medical Diagnosis from Referral:   Evaluation Date: 5/27/2020 S83.511A (ICD-10-CM) - Rupture of anterior cruciate ligament of right knee, initial encounter  Authorization Period Expiration: 12-31-20  Plan of Care Expiration: 11-30-20  Visit # / Visits authorized:14/ 20    Time In: 0735  Time Out: 0840  Total Billable Time: 60 minutes    Precautions: Standard     Sugery 5/25/20: 13 weeks 4 days post op    Subjective       Pt reports: no pain after last tx. Had some muscle soreness from biodex. States over all she feels she is progressing very well.    She was compliant with home exercise program.  Response to previous treatment: improved knee flexion  Functional change: improved ROM    Pain: 0/10  Location: right knee      Objective     ROM  L knee 7-0-150, flexion taken in prone  R knee 7-0-150, flexion taken in prone    Gait: independent, non antalgic, good knee extension at heel strike and mid stance    Ashli Eng received therapeutic exercises to develop strength, endurance, ROM and flexibility for 15 minutes including:  Elliptical 10 mins  H/s curls maroon 3 x  15-np  Leg press single leg 3 x 15 60 lbs 90 degrees- np  Hammer strength 3 x 10 10 lbs knee extension-np  Hammer strength 3 x 10 5 lbs knee flexion-np  QS to SLR with heel prop 2 x 10    Biodex training 180deg/sec 5 x 10 each leg          Ashli Eng received Dynamic Activities for strength, endurance, ROM, Function 40 mins including  Walking quad stretch  Walking knee pulls  Runner calf stretch  Lateral shuffle   carioca  Backwards lunge  Lateral over under    Knee cross body stretch  Standing couch stretch    Shuttle double leg jump 10 x 1 one bottom band  Shuttle single leg jump to double landing 2 set 10 x 1     Monster walk maroon at knees 2 laps    Time includes education in hep and review    Ashli Eng received cold pack for0 minutes to R knee.      Home Exercises Provided and Patient Education Provided     Education provided:   - updated hep    Written Home Exercises Provided: yes.  Exercises were reviewed and Ashli Eng was able to demonstrate them prior to the end of the session.  Ashli Eng demonstrated good  understanding of the education provided.     See EMR under Patient Instructions for exercises provided 5/29/2020.    Assessment     Pt needs cues for jump/landing technique. No increase in pain with any activities. She is progressing well. biodex in 1-2 weeks.    Ashli Eng is progressing well towards her goals.   Pt prognosis is Excellent.     Pt will continue to benefit from skilled outpatient physical therapy to address the deficits listed in the problem list box on initial evaluation, provide pt/family education and to maximize pt's level of independence in the home and community environment.     Pt's spiritual, cultural and educational needs considered and pt agreeable to plan of care and goals.     Anticipated barriers to physical therapy: none    Goals:   Short Term Goals: 10 weeks   1. Pt independent in initial hep- met  2. Decreased pain 0-3/10 with activities- met  3.  ROM 5-0-135 or better-met  4. Pt amb independently without significant deviations- progressing  5. Pt strength  4/5 or better throughout RLE- progressing  6. Pt able to perform proper body weight squat without significant deviation- progressing  7. No appreciable swelling- not met     Long Term Goals: 24 weeks   1. Pt independent in d/c hep  2. Decreased pain 0/10  3. ROM 5-0-150 or better  4. Biodex strength test 85% or better  5. Pt able to perform straight line jogging without significant deviations.    Plan     Continue to work on full AROM, reduction in swelling, isolated and compound strengthening. Will biodex at the 12 week stefanie for baseline strength measurements as long as there are not complications. Will start jogging progressions once criteria has been met ( full knee ext with quad control, single leg bounce 30 seconds or greater without deviation, biodex strength 75% of well leg, MMT hip abd 4/5 or better).    Willy Novak, PT

## 2020-09-08 ENCOUNTER — CLINICAL SUPPORT (OUTPATIENT)
Dept: REHABILITATION | Facility: HOSPITAL | Age: 40
End: 2020-09-08
Payer: COMMERCIAL

## 2020-09-08 DIAGNOSIS — M62.551 ATROPHY OF MUSCLE OF RIGHT THIGH: ICD-10-CM

## 2020-09-08 DIAGNOSIS — R26.9 ABNORMAL GAIT: ICD-10-CM

## 2020-09-08 DIAGNOSIS — M25.561 ACUTE PAIN OF RIGHT KNEE: ICD-10-CM

## 2020-09-08 PROCEDURE — 97110 THERAPEUTIC EXERCISES: CPT | Performed by: PHYSICAL THERAPIST

## 2020-09-08 PROCEDURE — 97530 THERAPEUTIC ACTIVITIES: CPT | Performed by: PHYSICAL THERAPIST

## 2020-09-18 ENCOUNTER — CLINICAL SUPPORT (OUTPATIENT)
Dept: REHABILITATION | Facility: HOSPITAL | Age: 40
End: 2020-09-18
Payer: COMMERCIAL

## 2020-09-18 DIAGNOSIS — M25.561 ACUTE PAIN OF RIGHT KNEE: ICD-10-CM

## 2020-09-18 DIAGNOSIS — M62.551 ATROPHY OF MUSCLE OF RIGHT THIGH: ICD-10-CM

## 2020-09-18 DIAGNOSIS — R26.9 ABNORMAL GAIT: ICD-10-CM

## 2020-09-18 PROCEDURE — 97110 THERAPEUTIC EXERCISES: CPT | Performed by: PHYSICAL THERAPIST

## 2020-09-18 NOTE — PROGRESS NOTES
.    Physical Therapy Progress Note     Name: Ashli Michaels  Clinic Number: [id number]    Therapy Diagnosis:   Encounter Diagnoses   Name Primary?    Acute pain of right knee     Abnormal gait     Atrophy of muscle of right thigh      Physician: Farhad Hess PA*    Visit Date: 9/18/2020     Patient will weight bear as tolerates with the brace locked in extension. Range of motion may be full. Plan to follow an ACL allograft rehab protocol. Initial goals include maintenance of full knee extension, regaining flexion, swelling control, and quadriceps activation exercises.  May wean the brace and crutches as soon as she demonstrates good quadriceps control over the knee.  Expected release for sports no earlier than 12 months following [place] sport cord testing for the allograft. DVT prophylaxis with ASA 81 mg twice daily x 2 weeks.      Medical Diagnosis from Referral:   Evaluation Date: 5/27/2020 S83.511A (ICD-10-CM) - Rupture of anterior cruciate ligament of right knee, initial encounter  Authorization Period Expiration: 12-31-20  Plan of Care Expiration: 11-30-20  Visit # / Visits authorized:15/ 20    Time In: 0700  Time Out: 0800  Total Billable Time: 45 minutes    Precautions: Standard     Sugery 5/25/20: 16 weeks 3 days post op    Subjective       Pt reports: pt reports no significant pain. States at times she will some mild anterior knee pain. Overall she feels that she is doing very well with rehab    She was compliant with home exercise program.  Response to previous treatment: improved knee flexion  Functional change: improved ROM    Pain: 0/10  Location: right knee      Objective     ROM  L knee 7-0-150, flexion taken in prone  R knee 7-0-150, flexion taken in prone    Gait: independent, non antalgic, good knee extension at heel strike and mid stance    Biodex: see attached form in Media section    Girth Measurement  Knee Left Right   15cm 42.5 41.4   0cm 33.2 33.4   15cm 33.6 32.9       Ashli  Albertina received therapeutic exercises to develop strength, endurance, ROM and flexibility for  minutes including:  Elliptical 5 mins    H/s curls maroon 3 x 15-np  Leg press single leg 3 x 15 60 lbs 90 degrees- np  Hammer strength 3 x 10 10 lbs knee extension-np  Hammer strength 3 x 10 5 lbs knee flexion-np  QS to SLR with heel prop 2 x 10    Biodex training 180deg/sec 5 x 10 each leg      Ashli Eng received Dynamic Activities for strength, endurance, ROM, Function 10 mins including  Walking quad stretch  Walking knee pulls  Runner calf stretch  Lateral shuffle   carioca  Backwards lunge  Lateral over under    Knee cross body stretch  Standing couch stretch    NP  Dumbbell deadlift tempo 3/3/3/0 40 lbs  Single leg squat 3 x 10    Shuttle single leg jump to double landing 3 x 10 1 lower band    Monster walk maroon at knees 2 laps-np    Time includes education in hep and review    Ashli Eng received cold pack for0 minutes to R knee.      Home Exercises Provided and Patient Education Provided     Education provided:   - updated hep    Written Home Exercises Provided: yes.  Exercises were reviewed and Ashli Eng was able to demonstrate them prior to the end of the session.  Ashli Eng demonstrated good  understanding of the education provided.     See EMR under Patient Instructions for exercises provided 5/29/2020.    Assessment     Pt has good ROM, progressing strength. Pt quad is about 20-25% weaker than well quad. Hamstring strength is within 10% difference. Overall she is progressing well. Will start return to running program. Pt to continue hep focusing on quad , hamstring , and hip strength.      Ashli Eng is progressing well towards her goals.   Pt prognosis is Excellent.     Pt will continue to benefit from skilled outpatient physical therapy to address the deficits listed in the problem list box on initial evaluation, provide pt/family education and to maximize pt's level of  independence in the home and community environment.     Pt's spiritual, cultural and educational needs considered and pt agreeable to plan of care and goals.     Anticipated barriers to physical therapy: none    Goals:   Short Term Goals: 10 weeks   1. Pt independent in initial hep- met  2. Decreased pain 0-3/10 with activities- met  3. ROM 5-0-135 or better-met  4. Pt amb independently without significant deviations- progressing  5. Pt strength  4/5 or better throughout RLE- progressing  6. Pt able to perform proper body weight squat without significant deviation- progressing  7. No appreciable swelling- not met     Long Term Goals: 24 weeks   1. Pt independent in d/c hep  2. Decreased pain 0/10  3. ROM 5-0-150 or better  4. Biodex strength test 85% or better  5. Pt able to perform straight line jogging without significant deviations.    Plan     Will Start return to run program. Pt to continues strengthening HEP.    Willy Novak, PT

## 2020-09-29 ENCOUNTER — OFFICE VISIT (OUTPATIENT)
Dept: URGENT CARE | Facility: CLINIC | Age: 40
End: 2020-09-29
Payer: COMMERCIAL

## 2020-09-29 VITALS — TEMPERATURE: 99 F | OXYGEN SATURATION: 100 % | HEART RATE: 92 BPM

## 2020-09-29 DIAGNOSIS — J30.9 ALLERGIC RHINITIS, UNSPECIFIED SEASONALITY, UNSPECIFIED TRIGGER: ICD-10-CM

## 2020-09-29 DIAGNOSIS — R05.9 COUGH: Primary | ICD-10-CM

## 2020-09-29 LAB
CTP QC/QA: YES
SARS-COV-2 RDRP RESP QL NAA+PROBE: NEGATIVE

## 2020-09-29 PROCEDURE — U0002: ICD-10-PCS | Mod: QW,S$GLB,, | Performed by: FAMILY MEDICINE

## 2020-09-29 PROCEDURE — 99213 PR OFFICE/OUTPT VISIT, EST, LEVL III, 20-29 MIN: ICD-10-PCS | Mod: S$GLB,,, | Performed by: FAMILY MEDICINE

## 2020-09-29 PROCEDURE — 99213 OFFICE O/P EST LOW 20 MIN: CPT | Mod: S$GLB,,, | Performed by: FAMILY MEDICINE

## 2020-09-29 PROCEDURE — U0002 COVID-19 LAB TEST NON-CDC: HCPCS | Mod: QW,S$GLB,, | Performed by: FAMILY MEDICINE

## 2020-09-29 NOTE — PROGRESS NOTES
Subjective:       Patient ID: Ashli Michaels is a 40 y.o. female.    Vitals:  temperature is 98.6 °F (37 °C). Her pulse is 92. Her oxygen saturation is 100%.     Chief Complaint: Cough    40-year-old female with cough over past 1 week.  She was around a co-worker 5 days ago who following day tested positive for COVID.  There were both wearing masks and greater than 6 ft apart.  No fever.  No shortness of breath.  Some degree of body ache.  Requesting COVID test.  She also has a 3-year-old with frequent cough          Cough  This is a new problem. The current episode started in the past 7 days. The problem has been unchanged. The problem occurs every few minutes. The cough is non-productive. Associated symptoms include myalgias and a sore throat. Pertinent negatives include no chills, ear pain, eye redness, fever, hemoptysis, rash, shortness of breath or wheezing. She has tried nothing for the symptoms. There is no history of asthma, bronchitis or pneumonia.       Constitution: Positive for fatigue. Negative for chills, sweating and fever.   HENT: Positive for sore throat. Negative for ear pain, congestion, sinus pain, sinus pressure and voice change.    Neck: Negative for painful lymph nodes.   Eyes: Negative for eye redness.   Respiratory: Positive for cough. Negative for chest tightness, sputum production, bloody sputum, COPD, shortness of breath, stridor, wheezing and asthma.    Gastrointestinal: Positive for nausea. Negative for vomiting.   Musculoskeletal: Positive for muscle ache.   Skin: Negative for rash.   Allergic/Immunologic: Negative for seasonal allergies and asthma.   Hematologic/Lymphatic: Negative for swollen lymph nodes.       Objective:      Physical Exam   Constitutional: She is oriented to person, place, and time. She appears well-developed. She is cooperative.  Non-toxic appearance. She does not appear ill. No distress.   HENT:   Head: Normocephalic and atraumatic.   Ears:   Right Ear:  Hearing, tympanic membrane, external ear and ear canal normal.   Left Ear: Hearing, tympanic membrane, external ear and ear canal normal.   Nose: Nose normal. No mucosal edema, rhinorrhea or nasal deformity. No epistaxis. Right sinus exhibits no maxillary sinus tenderness and no frontal sinus tenderness. Left sinus exhibits no maxillary sinus tenderness and no frontal sinus tenderness.   Mouth/Throat: Uvula is midline, oropharynx is clear and moist and mucous membranes are normal. No trismus in the jaw. Normal dentition. No uvula swelling. No oropharyngeal exudate, posterior oropharyngeal edema or posterior oropharyngeal erythema.      Comments: Pharynx with cobblestoning, otherwise negative.  Eyes: Conjunctivae and lids are normal. No scleral icterus.   Neck: Trachea normal, full passive range of motion without pain and phonation normal. Neck supple. No neck rigidity. No edema and no erythema present.   Cardiovascular: Normal rate, regular rhythm, normal heart sounds and normal pulses.   Pulmonary/Chest: Effort normal and breath sounds normal. No stridor. No respiratory distress. She has no decreased breath sounds. She has no wheezes. She has no rhonchi. She has no rales.   Abdominal: Normal appearance.   Musculoskeletal: Normal range of motion.         General: No deformity.   Neurological: She is alert and oriented to person, place, and time. She exhibits normal muscle tone. Coordination normal.   Skin: Skin is warm, dry, intact, not diaphoretic and not pale. Psychiatric: Her speech is normal and behavior is normal. Judgment and thought content normal.   Nursing note and vitals reviewed.        Results for orders placed or performed in visit on 09/29/20   POCT COVID-19 Rapid Screening   Result Value Ref Range    POC Rapid COVID Negative Negative     Acceptable Yes      Assessment:       1. Cough    2. Allergic rhinitis, unspecified seasonality, unspecified trigger        Plan:         Cough  -      POCT COVID-19 Rapid Screening    Allergic rhinitis, unspecified seasonality, unspecified trigger    TRY ZYRTEC OR CLARITIN OR ALLEGRA (OR GENERICS FOR THESE) DAILY AS NEEDED.    TRY USING FLONASE 2 INHALATIONS EACH NOSTRIL ONCE DAILY ON A REGULAR BASIS DURING THESE TIMES.    Make sure that you follow up with your primary care doctor in the next 2-5 days if needed .  Return to the Urgent Care if signs or symptoms change and certainly if you have worsening symptoms go to the nearest emergency department for further evaluation.

## 2020-09-29 NOTE — PATIENT INSTRUCTIONS
Understanding Nasal Allergies  Nasal allergies (also called allergic rhinitis) are a common health problem. They may be seasonal. This means they cause symptoms only at certain times of the year. Or they may be perennial. This means they cause symptoms all year long. Other health problems, such as asthma, often occur along with allergies as well.    What is an allergic reaction?  An allergy is a reaction to a substance called an allergen. Common allergens include:  · Wind-borne pollen  · Mold  · Dust mites  · Furry and feathered animals  · Cockroaches  Normally, allergens are harmless. But when a person has allergies, the body thinks they are harmful. The body then attacks allergens with antibodies. Antibodies are attached to special cells called mast cells. Allergens stick to the antibodies. This makes the mast cells release histamine and other chemicals. This is an allergic reaction. The chemicals irritate nearby nasal tissue. This causes nasal allergy symptoms.  Common nasal allergy symptoms  Allergies can cause nasal tissue to swell. This makes the air passages smaller. The nose may feel stuffed up. The nose may also make extra mucus, which can plug the nasal passages or drip out of the nose. Mucus can drip down the back of the throat (postnasal drip) as well. Sinus tissue can swell. This may cause pain and headache. Common allergy symptoms include:  · Runny nose with clear, watery discharge  · Stuffy nose (nasal congestion)  · Drainage down your throat (postnasal drip)  · Sneezing  · Red, watery eyes  · Itchy nose, eyes, ears, and throat  · Plugged-up ears (ear congestion)  · Sore throat  · Coughing  · Sinus pain and swelling  · Headache  It may not be allergies  Other health problems can cause symptoms like those of nasal allergies. These include:  · Nonallergic rhinitis and viruses such as colds  · Irritants and pollutants, such as strong odors or smoke  · Certain medicines  · Changes in the weather    Treatment  Your healthcare provider will evaluate you to find the cause of your symptoms then recommend treatment. If your symptoms are due to nasal allergies, your healthcare provider may prescribe nasal steroid sprays or oral antihistamines to help reduce symptoms. Avoidance of the allergen will also be suggested. You may also be referred to an allergist.   Date Last Reviewed: 10/1/2016  © 5420-8573 [employer]. [address] [place] [address], [place] [address]. All rights reserved. This information is not intended as a substitute for professional medical care. Always follow your healthcare professional's instructions.          TRY ZYRTEC OR CLARITIN OR ALLEGRA (OR GENERICS FOR THESE) DAILY AS NEEDED.    TRY USING FLONASE 2 INHALATIONS EACH NOSTRIL ONCE DAILY ON A REGULAR BASIS DURING THESE TIMES.    Make sure that you follow up with your primary care doctor in the next 2-5 days if needed .  Return to the Urgent Care if signs or symptoms change and certainly if you have worsening symptoms go to the nearest emergency department for further evaluation.

## 2020-10-02 ENCOUNTER — CLINICAL SUPPORT (OUTPATIENT)
Dept: REHABILITATION | Facility: HOSPITAL | Age: 40
End: 2020-10-02
Payer: COMMERCIAL

## 2020-10-02 DIAGNOSIS — R26.9 ABNORMAL GAIT: ICD-10-CM

## 2020-10-02 DIAGNOSIS — M25.561 ACUTE PAIN OF RIGHT KNEE: ICD-10-CM

## 2020-10-02 DIAGNOSIS — M62.551 ATROPHY OF MUSCLE OF RIGHT THIGH: ICD-10-CM

## 2020-10-02 PROCEDURE — 97530 THERAPEUTIC ACTIVITIES: CPT | Performed by: PHYSICAL THERAPIST

## 2020-10-02 NOTE — PROGRESS NOTES
.    Physical Therapy Progress Note     Name: Ashli Michaels  Clinic Number: [id number]    Therapy Diagnosis:   Encounter Diagnoses   Name Primary?    Acute pain of right knee     Abnormal gait     Atrophy of muscle of right thigh      Physician: Farhad Hess PA*    Visit Date: 10/2/2020     Patient will weight bear as tolerates with the brace locked in extension. Range of motion may be full. Plan to follow an ACL allograft rehab protocol. Initial goals include maintenance of full knee extension, regaining flexion, swelling control, and quadriceps activation exercises.  May wean the brace and crutches as soon as she demonstrates good quadriceps control over the knee.  Expected release for sports no earlier than 12 months following [place] sport cord testing for the allograft. DVT prophylaxis with ASA 81 mg twice daily x 2 weeks.      Medical Diagnosis from Referral:   Evaluation Date: 5/27/2020 S83.511A (ICD-10-CM) - Rupture of anterior cruciate ligament of right knee, initial encounter  Authorization Period Expiration: 12-31-20  Plan of Care Expiration: 11-30-20  Visit # / Visits authorized:17/ 20    Time In: 0700  Time Out: 0800  Total Billable Time: 45 minutes    Precautions: Standard     Sugery 5/25/20: 18 weeks 3 days post op    Subjective       Pt reports: pt reports no significant pain. States at times she will some mild anterior knee pain. Overall she feels that she is doing very well with rehab.    She was compliant with home exercise program.  Response to previous treatment: improved knee flexion  Functional change: improved ROM    Pain: 0/10  Location: right knee      Objective     ROM  L knee 7-0-150, flexion taken in prone  R knee 7-0-150, flexion taken in prone    Gait: independent, non antalgic, good knee extension at heel strike and mid stance    Biodex: see attached form in Media section    Girth Measurement  Knee Left Right   15cm 42.5 41.4   0cm 33.2 33.4   15cm 33.6 32.9        Ashli Eng received therapeutic exercises to develop strength, endurance, ROM and flexibility for  0minutes including:      Ashli Eng received Dynamic Activities for strength, endurance, ROM, Function 45 mins including  Walking quad stretch  Walking knee pulls  Runner calf stretch  Lateral shuffle   carioca  Backwards lunge  Lateral over under  Hip flexor hamstring   A skips  B skips    Knee cross body stretch  Standing couch stretch    Running  5 mins walk 3.5 mph  1 min jog 5.5 mph temp 160-170 bpm  5 min walk 3.5 mhp    Reviewed jogging progression  Perform plyometric training per return to run program.    Time includes education in hep and review    Ashli Eng received cold pack for0 minutes to R knee.      Home Exercises Provided and Patient Education Provided     Education provided:   - updated hep    Written Home Exercises Provided: yes.  Exercises were reviewed and Ashli Eng was able to demonstrate them prior to the end of the session.  Ashli Eng demonstrated good  understanding of the education provided.     See EMR under Patient Instructions for exercises provided 5/29/2020.    Assessment     ROM is looks good. Strength is progressing. She has some mild anterior knee pain at time but no swelling. Reviewed pain control modalities as well as sliding scale load, frequency of exercises. Pt to call if there are issues. She will strength train 2 x week, plyometric 1 x week, jog 1 x week for the next 4 weeks then we will reassess. Biodex test next visit.      Ashli Eng is progressing well towards her goals.   Pt prognosis is Excellent.     Pt will continue to benefit from skilled outpatient physical therapy to address the deficits listed in the problem list box on initial evaluation, provide pt/family education and to maximize pt's level of independence in the home and community environment.     Pt's spiritual, cultural and educational needs considered and pt agreeable to plan of  care and goals.     Anticipated barriers to physical therapy: none    Goals:   Short Term Goals: 10 weeks   1. Pt independent in initial hep- met  2. Decreased pain 0-3/10 with activities- met  3. ROM 5-0-135 or better-met  4. Pt amb independently without significant deviations- progressing  5. Pt strength  4/5 or better throughout RLE- progressing  6. Pt able to perform proper body weight squat without significant deviation- progressing  7. No appreciable swelling- not met     Long Term Goals: 24 weeks   1. Pt independent in d/c hep  2. Decreased pain 0/10  3. ROM 5-0-150 or better  4. Biodex strength test 85% or better  5. Pt able to perform straight line jogging without significant deviations.    Plan     Reassess and biodex.    Willy Novak, PT

## 2020-10-13 ENCOUNTER — OFFICE VISIT (OUTPATIENT)
Dept: URGENT CARE | Facility: CLINIC | Age: 40
End: 2020-10-13
Payer: COMMERCIAL

## 2020-10-13 VITALS
DIASTOLIC BLOOD PRESSURE: 53 MMHG | OXYGEN SATURATION: 97 % | HEART RATE: 85 BPM | TEMPERATURE: 99 F | BODY MASS INDEX: 20.89 KG/M2 | WEIGHT: 130 LBS | SYSTOLIC BLOOD PRESSURE: 109 MMHG | HEIGHT: 66 IN

## 2020-10-13 DIAGNOSIS — R05.9 COUGH: Primary | ICD-10-CM

## 2020-10-13 LAB
CTP QC/QA: YES
SARS-COV-2 RDRP RESP QL NAA+PROBE: NEGATIVE

## 2020-10-13 PROCEDURE — U0002 COVID-19 LAB TEST NON-CDC: HCPCS | Mod: QW,S$GLB,, | Performed by: INTERNAL MEDICINE

## 2020-10-13 PROCEDURE — U0002: ICD-10-PCS | Mod: QW,S$GLB,, | Performed by: INTERNAL MEDICINE

## 2020-10-13 PROCEDURE — 99213 PR OFFICE/OUTPT VISIT, EST, LEVL III, 20-29 MIN: ICD-10-PCS | Mod: S$GLB,,, | Performed by: INTERNAL MEDICINE

## 2020-10-13 PROCEDURE — 99213 OFFICE O/P EST LOW 20 MIN: CPT | Mod: S$GLB,,, | Performed by: INTERNAL MEDICINE

## 2020-10-13 NOTE — LETTER
[address] [place] [address]  Phone: [phone]  Fax: [phone]          Return to Work/School    Patient: Ashli Michaels  YOB: 1980   Date: 10/13/2020     To Whom It May Concern:     Ashli Michaels was in contact with/seen in my office on 10/13/2020. COVID-19 is present in our communities across the state. Ashli tested negative for COVID-19.     If you have any questions or concerns, or if I can be of further assistance, please do not hesitate to contact me.     Sincerely,    Elías Rivas MD

## 2020-10-13 NOTE — PROGRESS NOTES
"Subjective:       Patient ID: Ashli Michaels is a 40 y.o. female.    Vitals:  height is 5' 6" (1.676 m) and weight is 59 kg (130 lb). Her temperature is 98.7 °F (37.1 °C). Her blood pressure is 109/53 (abnormal) and her pulse is 85. Her oxygen saturation is 97%.     Chief Complaint: Cough    Cough  This is a new problem. The current episode started in the past 7 days. The problem has been unchanged. The problem occurs every few minutes. The cough is non-productive. Pertinent negatives include no chills, ear pain, eye redness, fever, hemoptysis, myalgias, rash, sore throat, shortness of breath or wheezing. She has tried nothing for the symptoms.       Constitution: Positive for fatigue. Negative for chills, sweating and fever.   HENT: Negative for ear pain, congestion, sinus pain, sinus pressure, sore throat and voice change.    Neck: Negative for painful lymph nodes.   Eyes: Negative for eye redness.   Respiratory: Positive for cough. Negative for chest tightness, sputum production, bloody sputum, COPD, shortness of breath, stridor, wheezing and asthma.    Gastrointestinal: Negative for nausea and vomiting.   Musculoskeletal: Negative for muscle ache.   Skin: Negative for rash and erythema.   Allergic/Immunologic: Negative for seasonal allergies and asthma.   Hematologic/Lymphatic: Negative for swollen lymph nodes.       Objective:      Physical Exam   Constitutional: She is oriented to person, place, and time. She appears well-developed. No distress.   HENT:   Head: Normocephalic and atraumatic.   Ears:   Right Ear: External ear normal.   Left Ear: External ear normal.   Nose: Nose normal.   Mouth/Throat: Oropharynx is clear and moist. No oropharyngeal exudate.   Eyes: Pupils are equal, round, and reactive to light. Conjunctivae and EOM are normal. Right eye exhibits no discharge. Left eye exhibits no discharge. No scleral icterus.   Neck: Normal range of motion. Neck supple.   Cardiovascular: Normal rate, " regular rhythm and normal heart sounds. Exam reveals no gallop and no friction rub.   No murmur heard.  Pulmonary/Chest: Effort normal. No respiratory distress. She has no wheezes. She has no rales.   Abdominal: Soft. Bowel sounds are normal. She exhibits no distension.   Lymphadenopathy:     She has no cervical adenopathy.   Neurological: She is alert and oriented to person, place, and time.   Skin: Skin is warm, dry, not diaphoretic and no rash. Capillary refill takes less than 2 seconds. erythemaPsychiatric: Her behavior is normal.   Nursing note and vitals reviewed.        Assessment:       1. Cough        Plan:         Cough  -     POCT COVID-19 Rapid Screening    COVID negative in clinic, no positive contacts. Likely component of allergic rhinitis, post nasal drip. Recommended antihistamines, flonase for symptoms.

## 2020-10-13 NOTE — PATIENT INSTRUCTIONS
You were tested for COVID-19 and tested negative. You had no contact as defined by the CDC with a known positive individual, therefore you are OK to return to work. Please continue practicing good hand hygiene, social distancing, and mask guidelines to help prevent further spread of the Coronavirus.       Viral Upper Respiratory Illness (Adult)  You have a viral upper respiratory illness (URI), which is another term for the common cold. This illness is contagious during the first few days. It is spread through the air by coughing and sneezing. It may also be spread by direct contact (touching the sick person and then touching your own eyes, nose, or mouth). Frequent handwashing will decrease risk of spread. Most viral illnesses go away within 7 to 10 days with rest and simple home remedies. Sometimes the illness may last for several weeks. Antibiotics will not kill a virus, and they are generally not prescribed for this condition.    Home care  · If symptoms are severe, rest at home for the first 2 to 3 days. When you resume activity, don't let yourself get too tired.  · Avoid being exposed to cigarette smoke (yours or others).  · You may use acetaminophen or ibuprofen to control pain and fever, unless another medicine was prescribed. (Note: If you have chronic liver or kidney disease, have ever had a stomach ulcer or gastrointestinal bleeding, or are taking blood-thinning medicines, talk with your healthcare provider before using these medicines.) Aspirin should never be given to anyone under 18 years of age who is ill with a viral infection or fever. It may cause severe liver or brain damage.  · Your appetite may be poor, so a light diet is fine. Avoid dehydration by drinking 6 to 8 glasses of fluids per day (water, soft drinks, juices, tea, or soup). Extra fluids will help loosen secretions in the nose and lungs.  · Over-the-counter cold medicines will not shorten the length of time youre sick, but they may  be helpful for the following symptoms: cough, sore throat, and nasal and sinus congestion. (Note: Do not use decongestants if you have high blood pressure.)  Follow-up care  Follow up with your healthcare provider, or as advised.  When to seek medical advice  Call your healthcare provider right away if any of these occur:  · Cough with lots of colored sputum (mucus)  · Severe headache; face, neck, or ear pain  · Difficulty swallowing due to throat pain  · Fever of 100.4°F (38°C)  Call 911, or get immediate medical care  Call emergency services right away if any of these occur:  · Chest pain, shortness of breath, wheezing, or difficulty breathing  · Coughing up blood  · Inability to swallow due to throat pain  Date Last Reviewed: 9/13/2015  © 7496-2922 [employer]. [address] [place] [address], [place] [address]. All rights reserved. This information is not intended as a substitute for professional medical care. Always follow your healthcare professional's instructions.

## 2020-11-23 NOTE — PROGRESS NOTES
DATE OF PROCEDURE: 5/25/2020   PROCEDURES PERFORMED:   Right knee arthroscopic all inside aseptic allograft ACL reconstruction (CPT 21103)     Ashli Michaels reports to be doing well just over 6 months s/p the above mentioned procedure. Going to PT 1x Week at the [place] location which has been going well.   She mentions that her knee feels much better, the incision looks good. She does report having mild anterior knee pain when she runs, otherwise she is pleased with her progress.    REVIEW OF SYSTEMS:   Constitution: Negative. Negative for chills, fever and night sweats.    Hematologic/Lymphatic: Negative for bleeding problem. Does not bruise/bleed easily.   Skin: Negative for dry skin, itching and rash.   Musculoskeletal: Negative for falls. Negative for right knee pain and muscle weakness.     All other review of symptoms were reviewed and found to be noncontributory.     PAST MEDICAL HISTORY:   Past Medical History:   Diagnosis Date    Premature ovarian failure      PAST SURGICAL HISTORY:   Past Surgical History:   Procedure Laterality Date    BONE GRAFT Right 5/25/2020    Procedure: BONE GRAFT- AUTOGRAFT;  Surgeon: IRLANDA Corral MD;  Location: [facility] OR;  Service: Orthopedics;  Laterality: Right;    RECONSTRUCTION OF LIGAMENT Right 5/25/2020    Procedure: RECONSTRUCTION, LIGAMENT, ANTERIOR CRUCIATE LIGAMENT;  Surgeon: IRLANDA Corral MD;  Location: [facility] OR;  Service: Orthopedics;  Laterality: Right;  Regional w/Catheter - Adductor    Pericapsular Injection: Clonidine/Epi/Ketorolac/Ropivacaine 30cc    REPAIR OF MENISCUS OF KNEE Right 5/25/2020    Procedure: REPAIR, MENISCUS, KNEE;  Surgeon: IRLANDA Corral MD;  Location: [facility] OR;  Service: Orthopedics;  Laterality: Right;  Regional w/Catheter - Adductor    Pericapsular Injection: Clonidine/Epi/Ketorolac/Ropivacaine 30cc     FAMILY HISTORY:   Family History   Problem Relation Age of Onset    Colon cancer Maternal Grandmother     Hypertension  Neg Hx      labor Neg Hx      SOCIAL HISTORY:   Social History     Socioeconomic History    Marital status:      Spouse name: Not on file    Number of children: Not on file    Years of education: Not on file    Highest education level: Not on file   Occupational History    Not on file   Social Needs    Financial resource strain: Not on file    Food insecurity     Worry: Not on file     Inability: Not on file    Transportation needs     Medical: Not on file     Non-medical: Not on file   Tobacco Use    Smoking status: Never Smoker    Smokeless tobacco: Never Used   Substance and Sexual Activity    Alcohol use: Yes    Drug use: No    Sexual activity: Yes     Partners: Male     Birth control/protection: None   Lifestyle    Physical activity     Days per week: Not on file     Minutes per session: Not on file    Stress: Not on file   Relationships    Social connections     Talks on phone: Not on file     Gets together: Not on file     Attends Yazidism service: Not on file     Active member of club or organization: Not on file     Attends meetings of clubs or organizations: Not on file     Relationship status: Not on file   Other Topics Concern    Not on file   Social History Narrative    Not on file     MEDICATIONS:     Current Outpatient Medications:     aspirin (ECOTRIN) 81 MG EC tablet, Take 1 tablet (81 mg total) by mouth 2 (two) times daily. for 14 days, Disp: 28 tablet, Rfl: 0    estradiol (VIVELLE-DOT) 0.1 mg/24 hr PTSW, Place 1 patch onto the skin twice a week., Disp: 8 patch, Rfl: 6    HYDROcodone-acetaminophen (NORCO) 5-325 mg per tablet, Take 1 tablet by mouth every 12 (twelve) hours as needed for Pain. Do not operate machinery or drive while taking this medication., Disp: 20 tablet, Rfl: 0    sulfamethoxazole-trimethoprim 800-160mg (BACTRIM DS) 800-160 mg Tab, Take 1 tablet by mouth 2 (two) times daily. (Patient not taking: Reported on 2020), Disp: 14 tablet, Rfl:  0    traMADoL (ULTRAM) 50 mg tablet, 1 tabs every 12 hours/PRN; do not drive or operate machinery while taking this medication, Disp: 20 tablet, Rfl: 0    Current Facility-Administered Medications:     levonorgestrel 20 mcg/24 hr (5 years) IUD 1 Intra Uterine Device, 1 Intra Uterine Device, Intrauterine, , Shanon Romo MD, 1 Intra Uterine Device at 01/17/19 1109    ALLERGIES:   Review of patient's allergies indicates:   Allergen Reactions    Erythromycin Other (See Comments) and Rash     As a child; hives      PHYSICAL EXAMINATION:  There were no vitals taken for this visit.  General: Well-developed well-nourished 40 y.o. femalein no acute distress   Cardiovascular: Regular rhythm by palpation of distal pulse, normal color and temperature, no concerning varicosities on symptomatic side   Lungs: No labored breathing or wheezing appreciated   Neuro: Alert and oriented ×3   Psychiatric: well oriented to person, place and time, demonstrates normal mood and affect   Skin: No rashes, lesions or ulcers, normal temperature, turgor, and texture on involved extremity    Ortho/SPM Exam     Exam of the right knee:  The incisions are well healed.  There are no signs of infection. Mild quadriceps atrophy. Evident hip abductor weakness.  No swelling or effusion.  No tenderness over lateral incision or joint lines today.  There is some mild hypertrophic scar deep to the skin level over that area.  Related to previous ITB band closure.  Full active range of motion of the knee without pain.  Negative Lachman 1 A.  Quad atrophy present.    IMAGING:  No new imaging.    ASSESSMENT:      ICD-10-CM ICD-9-CM   1. Quadriceps weakness  M62.81 728.87      PLAN:     -Improve quadriceps and abductor strength to improve anterior knee pain to address biomechanical weaknesses  -She will continue therapy and HEP  -RTC 2 months    Procedures

## 2020-11-30 ENCOUNTER — CLINICAL SUPPORT (OUTPATIENT)
Dept: REHABILITATION | Facility: HOSPITAL | Age: 40
End: 2020-11-30
Payer: COMMERCIAL

## 2020-11-30 DIAGNOSIS — M25.561 ACUTE PAIN OF RIGHT KNEE: ICD-10-CM

## 2020-11-30 DIAGNOSIS — R26.9 ABNORMAL GAIT: ICD-10-CM

## 2020-11-30 DIAGNOSIS — M62.551 ATROPHY OF MUSCLE OF RIGHT THIGH: ICD-10-CM

## 2020-12-01 ENCOUNTER — OFFICE VISIT (OUTPATIENT)
Dept: SPORTS MEDICINE | Facility: CLINIC | Age: 40
End: 2020-12-01
Payer: COMMERCIAL

## 2020-12-01 VITALS
BODY MASS INDEX: 21.38 KG/M2 | HEART RATE: 65 BPM | SYSTOLIC BLOOD PRESSURE: 114 MMHG | HEIGHT: 66 IN | WEIGHT: 133 LBS | DIASTOLIC BLOOD PRESSURE: 71 MMHG

## 2020-12-01 DIAGNOSIS — M62.81 QUADRICEPS WEAKNESS: Primary | ICD-10-CM

## 2020-12-01 PROCEDURE — 99213 PR OFFICE/OUTPT VISIT, EST, LEVL III, 20-29 MIN: ICD-10-PCS | Mod: S$GLB,,, | Performed by: ORTHOPAEDIC SURGERY

## 2020-12-01 PROCEDURE — 3008F PR BODY MASS INDEX (BMI) DOCUMENTED: ICD-10-PCS | Mod: CPTII,S$GLB,, | Performed by: ORTHOPAEDIC SURGERY

## 2020-12-01 PROCEDURE — 99999 PR PBB SHADOW E&M-EST. PATIENT-LVL III: ICD-10-PCS | Mod: PBBFAC,,, | Performed by: ORTHOPAEDIC SURGERY

## 2020-12-01 PROCEDURE — 99999 PR PBB SHADOW E&M-EST. PATIENT-LVL III: CPT | Mod: PBBFAC,,, | Performed by: ORTHOPAEDIC SURGERY

## 2020-12-01 PROCEDURE — 99213 OFFICE O/P EST LOW 20 MIN: CPT | Mod: S$GLB,,, | Performed by: ORTHOPAEDIC SURGERY

## 2020-12-01 PROCEDURE — 1126F AMNT PAIN NOTED NONE PRSNT: CPT | Mod: S$GLB,,, | Performed by: ORTHOPAEDIC SURGERY

## 2020-12-01 PROCEDURE — 3008F BODY MASS INDEX DOCD: CPT | Mod: CPTII,S$GLB,, | Performed by: ORTHOPAEDIC SURGERY

## 2020-12-01 PROCEDURE — 1126F PR PAIN SEVERITY QUANTIFIED, NO PAIN PRESENT: ICD-10-PCS | Mod: S$GLB,,, | Performed by: ORTHOPAEDIC SURGERY

## 2020-12-01 NOTE — PROGRESS NOTES
Outpatient Therapy Updated Plan of Care     Visit Date: 11/30/2020  Name: Ashli Michaels  Clinic Number: [id number]    Therapy Diagnosis:   Encounter Diagnoses   Name Primary?    Acute pain of right knee     Abnormal gait     Atrophy of muscle of right thigh      Physician: Farhad Hess PA*    Physician Orders: eval and treat  Medical Diagnosis: R ACL reconstruction  Evaluation Date: 5-27-20    Total Visits Received: 18  Cancelled Visits: 0  No Show Visits: 0    Current Certification Period:  5-27-20 to 11-30-20  Precautions:  n/a  Visits from Evaluation Date:  18  Functional Level Prior to Evaluation:  Independent in all activities    Subjective     Update: Pt states she is doing well. Feel she has made good progress. States she has been performing issued hep but has no been consistent. States she has been performing straight line jogging. Feels that she would be able to continue independently with hep. Reports 85-90% improvement in function. Pain 0/10. Pt reports some anterior knee pain on occasion.    Objective     Update:   ROM   7-0-155 R knee    Strength: see attached biodex in media section. Pt has less than 15% deficit in R quad compared to L.    Girth:                    L                   R  15 cm above:      43.3              42.5  Mid patella:         34.1              35  15 cm below:       34                 33.9    Assessment     Update: pt has made good progress since starting therapy. She is independent in current hep and has good quad/ hamstring strength. She has been given a return to running program and shows understanding of progression.  Pt to be d/c at this time with all goals met. Pt to contact me as needed.       Short Term Goals: 10 weeks - met  1. Pt independent in initial hep  2. Decreased pain 0-3/10 with activities  3. ROM 5-0-135 or better  4. Pt amb independently without significant deviations  5. Pt strength  4/5 or better throughout RLE  6. Pt able to perform  proper body weight squat without significant deviation  7. No appreciable swelling     Long Term Goals: 24 weeks - met  1. Pt independent in d/c hep  2. Decreased pain 0/10  3. ROM 5-0-150 or better  4. Biodex strength test 85% or better  5. Pt able to perform straight line jogging without significant deviations.    Reasons for Recertification of Therapy:   n/a    Plan     Updated Certification Period: n/a    Other Recommendations: discharge    Willy Novak, PT  11/30/2020      I CERTIFY THE NEED FOR THESE SERVICES FURNISHED UNDER THIS PLAN OF TREATMENT AND WHILE UNDER MY CARE    Physician's comments:        Physician's Signature: ___________________________________________________

## 2020-12-01 NOTE — PLAN OF CARE
Outpatient Therapy Discharge Note     Visit Date: 11/30/2020  Name: Ashli Michaels  Clinic Number: [id number]    Therapy Diagnosis:   Encounter Diagnoses   Name Primary?    Acute pain of right knee     Abnormal gait     Atrophy of muscle of right thigh      Physician: Farhad Hess PA*    Physician Orders: eval and treat  Medical Diagnosis: R ACL reconstruction  Evaluation Date: 5-27-20    Total Visits Received: 18  Cancelled Visits: 0  No Show Visits: 0    Current Certification Period:  5-27-20 to 11-30-20  Precautions:  n/a  Visits from Evaluation Date:  18  Functional Level Prior to Evaluation:  Independent in all activities    Subjective     Update: Pt states she is doing well. Feel she has made good progress. States she has been performing issued hep but has no been consistent. States she has been performing straight line jogging. Feels that she would be able to continue independently with hep. Reports 85-90% improvement in function. Pain 0/10. Pt reports some anterior knee pain on occasion.    Objective     Update:   ROM   7-0-155 R knee    Strength: see attached biodex in media section. Pt has less than 15% deficit in R quad compared to L.    Girth:                    L                   R  15 cm above:      43.3              42.5  Mid patella:         34.1              35  15 cm below:       34                 33.9    Assessment     Update: pt has made good progress since starting therapy. She is independent in current hep and has good quad/ hamstring strength. She has been given a return to running program and shows understanding of progression.  Pt to be d/c at this time with all goals met. Pt to contact me as needed.       Short Term Goals: 10 weeks - met  1. Pt independent in initial hep  2. Decreased pain 0-3/10 with activities  3. ROM 5-0-135 or better  4. Pt amb independently without significant deviations  5. Pt strength  4/5 or better throughout RLE  6. Pt able to perform proper  body weight squat without significant deviation  7. No appreciable swelling     Long Term Goals: 24 weeks - met  1. Pt independent in d/c hep  2. Decreased pain 0/10  3. ROM 5-0-150 or better  4. Biodex strength test 85% or better  5. Pt able to perform straight line jogging without significant deviations.    Reasons for Recertification of Therapy:   n/a    Plan     Updated Certification Period: n/a    Other Recommendations: discharge    Willy Novak, PT  11/30/2020      I CERTIFY THE NEED FOR THESE SERVICES FURNISHED UNDER THIS PLAN OF TREATMENT AND WHILE UNDER MY CARE    Physician's comments:        Physician's Signature: ___________________________________________________

## 2020-12-04 ENCOUNTER — PATIENT MESSAGE (OUTPATIENT)
Dept: OBSTETRICS AND GYNECOLOGY | Facility: CLINIC | Age: 40
End: 2020-12-04

## 2020-12-04 DIAGNOSIS — E28.39 PREMATURE OVARIAN FAILURE: ICD-10-CM

## 2020-12-04 RX ORDER — ESTRADIOL 0.1 MG/D
1 FILM, EXTENDED RELEASE TRANSDERMAL
Qty: 8 PATCH | Refills: 6 | Status: SHIPPED | OUTPATIENT
Start: 2020-12-07 | End: 2021-08-23 | Stop reason: SDUPTHER

## 2020-12-16 ENCOUNTER — HOSPITAL ENCOUNTER (OUTPATIENT)
Dept: RADIOLOGY | Facility: OTHER | Age: 40
Discharge: HOME | End: 2020-12-16
Attending: OBSTETRICS & GYNECOLOGY
Payer: COMMERCIAL

## 2020-12-16 DIAGNOSIS — Z12.39 SCREENING FOR BREAST CANCER: ICD-10-CM

## 2020-12-16 DIAGNOSIS — Z12.31 BREAST CANCER SCREENING BY MAMMOGRAM: ICD-10-CM

## 2020-12-16 PROCEDURE — 77067 SCR MAMMO BI INCL CAD: CPT | Mod: 26,,, | Performed by: RADIOLOGY

## 2020-12-16 PROCEDURE — 77067 MAMMO DIGITAL SCREENING BILAT WITH TOMO: ICD-10-PCS | Mod: 26,,, | Performed by: RADIOLOGY

## 2020-12-16 PROCEDURE — 77063 BREAST TOMOSYNTHESIS BI: CPT | Mod: 26,,, | Performed by: RADIOLOGY

## 2020-12-16 PROCEDURE — 77063 MAMMO DIGITAL SCREENING BILAT WITH TOMO: ICD-10-PCS | Mod: 26,,, | Performed by: RADIOLOGY

## 2020-12-16 PROCEDURE — 77067 SCR MAMMO BI INCL CAD: CPT | Mod: TC

## 2021-01-27 ENCOUNTER — OFFICE VISIT (OUTPATIENT)
Dept: OBSTETRICS AND GYNECOLOGY | Facility: CLINIC | Age: 41
End: 2021-01-27
Payer: COMMERCIAL

## 2021-01-27 ENCOUNTER — PATIENT MESSAGE (OUTPATIENT)
Dept: OBSTETRICS AND GYNECOLOGY | Facility: CLINIC | Age: 41
End: 2021-01-27

## 2021-01-27 VITALS
SYSTOLIC BLOOD PRESSURE: 102 MMHG | DIASTOLIC BLOOD PRESSURE: 74 MMHG | WEIGHT: 133.38 LBS | HEIGHT: 66 IN | BODY MASS INDEX: 21.44 KG/M2

## 2021-01-27 DIAGNOSIS — Z01.419 WELL WOMAN EXAM WITH ROUTINE GYNECOLOGICAL EXAM: Primary | ICD-10-CM

## 2021-01-27 PROCEDURE — 1126F AMNT PAIN NOTED NONE PRSNT: CPT | Mod: S$GLB,,, | Performed by: OBSTETRICS & GYNECOLOGY

## 2021-01-27 PROCEDURE — 99396 PR PREVENTIVE VISIT,EST,40-64: ICD-10-PCS | Mod: S$GLB,,, | Performed by: OBSTETRICS & GYNECOLOGY

## 2021-01-27 PROCEDURE — 99396 PREV VISIT EST AGE 40-64: CPT | Mod: S$GLB,,, | Performed by: OBSTETRICS & GYNECOLOGY

## 2021-01-27 PROCEDURE — 3008F BODY MASS INDEX DOCD: CPT | Mod: CPTII,S$GLB,, | Performed by: OBSTETRICS & GYNECOLOGY

## 2021-01-27 PROCEDURE — 99999 PR PBB SHADOW E&M-EST. PATIENT-LVL II: CPT | Mod: PBBFAC,,, | Performed by: OBSTETRICS & GYNECOLOGY

## 2021-01-27 PROCEDURE — 3008F PR BODY MASS INDEX (BMI) DOCUMENTED: ICD-10-PCS | Mod: CPTII,S$GLB,, | Performed by: OBSTETRICS & GYNECOLOGY

## 2021-01-27 PROCEDURE — 1126F PR PAIN SEVERITY QUANTIFIED, NO PAIN PRESENT: ICD-10-PCS | Mod: S$GLB,,, | Performed by: OBSTETRICS & GYNECOLOGY

## 2021-01-27 PROCEDURE — 99999 PR PBB SHADOW E&M-EST. PATIENT-LVL II: ICD-10-PCS | Mod: PBBFAC,,, | Performed by: OBSTETRICS & GYNECOLOGY

## 2021-02-11 ENCOUNTER — TELEPHONE (OUTPATIENT)
Dept: SPORTS MEDICINE | Facility: CLINIC | Age: 41
End: 2021-02-11

## 2021-04-26 ENCOUNTER — PATIENT MESSAGE (OUTPATIENT)
Dept: RESEARCH | Facility: HOSPITAL | Age: 41
End: 2021-04-26

## 2021-05-17 ENCOUNTER — TELEPHONE (OUTPATIENT)
Dept: INTERNAL MEDICINE | Facility: CLINIC | Age: 41
End: 2021-05-17

## 2021-05-31 ENCOUNTER — OFFICE VISIT (OUTPATIENT)
Dept: INTERNAL MEDICINE | Facility: CLINIC | Age: 41
End: 2021-05-31
Payer: COMMERCIAL

## 2021-05-31 ENCOUNTER — LAB VISIT (OUTPATIENT)
Dept: LAB | Facility: HOSPITAL | Age: 41
End: 2021-05-31
Attending: INTERNAL MEDICINE
Payer: COMMERCIAL

## 2021-05-31 ENCOUNTER — PATIENT MESSAGE (OUTPATIENT)
Dept: INTERNAL MEDICINE | Facility: CLINIC | Age: 41
End: 2021-05-31

## 2021-05-31 VITALS
HEART RATE: 70 BPM | DIASTOLIC BLOOD PRESSURE: 64 MMHG | WEIGHT: 136.25 LBS | HEIGHT: 66 IN | BODY MASS INDEX: 21.9 KG/M2 | SYSTOLIC BLOOD PRESSURE: 94 MMHG | OXYGEN SATURATION: 99 %

## 2021-05-31 DIAGNOSIS — Z12.83 SKIN CANCER SCREENING: ICD-10-CM

## 2021-05-31 DIAGNOSIS — Z00.00 ANNUAL PHYSICAL EXAM: ICD-10-CM

## 2021-05-31 DIAGNOSIS — F43.9 STRESS: ICD-10-CM

## 2021-05-31 DIAGNOSIS — Z00.00 ANNUAL PHYSICAL EXAM: Primary | ICD-10-CM

## 2021-05-31 LAB
ALBUMIN SERPL BCP-MCNC: 3.9 G/DL (ref 3.5–5.2)
ALP SERPL-CCNC: 53 U/L (ref 55–135)
ALT SERPL W/O P-5'-P-CCNC: 12 U/L (ref 10–44)
ANION GAP SERPL CALC-SCNC: 9 MMOL/L (ref 8–16)
AST SERPL-CCNC: 18 U/L (ref 10–40)
BASOPHILS # BLD AUTO: 0.1 K/UL (ref 0–0.2)
BASOPHILS NFR BLD: 1.4 % (ref 0–1.9)
BILIRUB SERPL-MCNC: 0.4 MG/DL (ref 0.1–1)
BUN SERPL-MCNC: 11 MG/DL (ref 6–20)
CALCIUM SERPL-MCNC: 9.6 MG/DL (ref 8.7–10.5)
CHLORIDE SERPL-SCNC: 106 MMOL/L (ref 95–110)
CHOLEST SERPL-MCNC: 198 MG/DL (ref 120–199)
CHOLEST/HDLC SERPL: 3.2 {RATIO} (ref 2–5)
CO2 SERPL-SCNC: 25 MMOL/L (ref 23–29)
CREAT SERPL-MCNC: 0.8 MG/DL (ref 0.5–1.4)
DIFFERENTIAL METHOD: ABNORMAL
EOSINOPHIL # BLD AUTO: 0.6 K/UL (ref 0–0.5)
EOSINOPHIL NFR BLD: 8.3 % (ref 0–8)
ERYTHROCYTE [DISTWIDTH] IN BLOOD BY AUTOMATED COUNT: 12.6 % (ref 11.5–14.5)
EST. GFR  (AFRICAN AMERICAN): >60 ML/MIN/1.73 M^2
EST. GFR  (NON AFRICAN AMERICAN): >60 ML/MIN/1.73 M^2
ESTIMATED AVG GLUCOSE: 97 MG/DL (ref 68–131)
GLUCOSE SERPL-MCNC: 82 MG/DL (ref 70–110)
HBA1C MFR BLD: 5 % (ref 4–5.6)
HCT VFR BLD AUTO: 40.8 % (ref 37–48.5)
HCV AB SERPL QL IA: NEGATIVE
HDLC SERPL-MCNC: 61 MG/DL (ref 40–75)
HDLC SERPL: 30.8 % (ref 20–50)
HGB BLD-MCNC: 13.1 G/DL (ref 12–16)
HIV 1+2 AB+HIV1 P24 AG SERPL QL IA: NEGATIVE
IMM GRANULOCYTES # BLD AUTO: 0.02 K/UL (ref 0–0.04)
IMM GRANULOCYTES NFR BLD AUTO: 0.3 % (ref 0–0.5)
LDLC SERPL CALC-MCNC: 128.4 MG/DL (ref 63–159)
LYMPHOCYTES # BLD AUTO: 1.9 K/UL (ref 1–4.8)
LYMPHOCYTES NFR BLD: 26.8 % (ref 18–48)
MCH RBC QN AUTO: 30.9 PG (ref 27–31)
MCHC RBC AUTO-ENTMCNC: 32.1 G/DL (ref 32–36)
MCV RBC AUTO: 96 FL (ref 82–98)
MONOCYTES # BLD AUTO: 0.5 K/UL (ref 0.3–1)
MONOCYTES NFR BLD: 7.6 % (ref 4–15)
NEUTROPHILS # BLD AUTO: 3.9 K/UL (ref 1.8–7.7)
NEUTROPHILS NFR BLD: 55.6 % (ref 38–73)
NONHDLC SERPL-MCNC: 137 MG/DL
NRBC BLD-RTO: 0 /100 WBC
PLATELET # BLD AUTO: 298 K/UL (ref 150–450)
PMV BLD AUTO: 10.4 FL (ref 9.2–12.9)
POTASSIUM SERPL-SCNC: 4.4 MMOL/L (ref 3.5–5.1)
PROT SERPL-MCNC: 6.8 G/DL (ref 6–8.4)
RBC # BLD AUTO: 4.24 M/UL (ref 4–5.4)
SODIUM SERPL-SCNC: 140 MMOL/L (ref 136–145)
TRIGL SERPL-MCNC: 43 MG/DL (ref 30–150)
TSH SERPL DL<=0.005 MIU/L-ACNC: 2.54 UIU/ML (ref 0.4–4)
WBC # BLD AUTO: 6.98 K/UL (ref 3.9–12.7)

## 2021-05-31 PROCEDURE — 99396 PR PREVENTIVE VISIT,EST,40-64: ICD-10-PCS | Mod: 25,S$GLB,, | Performed by: INTERNAL MEDICINE

## 2021-05-31 PROCEDURE — 99396 PREV VISIT EST AGE 40-64: CPT | Mod: 25,S$GLB,, | Performed by: INTERNAL MEDICINE

## 2021-05-31 PROCEDURE — 86703 HIV-1/HIV-2 1 RESULT ANTBDY: CPT | Performed by: INTERNAL MEDICINE

## 2021-05-31 PROCEDURE — 85025 COMPLETE CBC W/AUTO DIFF WBC: CPT | Performed by: INTERNAL MEDICINE

## 2021-05-31 PROCEDURE — 90471 IMMUNIZATION ADMIN: CPT | Mod: S$GLB,,, | Performed by: INTERNAL MEDICINE

## 2021-05-31 PROCEDURE — 99999 PR PBB SHADOW E&M-EST. PATIENT-LVL IV: CPT | Mod: PBBFAC,,, | Performed by: INTERNAL MEDICINE

## 2021-05-31 PROCEDURE — 90471 TDAP VACCINE GREATER THAN OR EQUAL TO 7YO IM: ICD-10-PCS | Mod: S$GLB,,, | Performed by: INTERNAL MEDICINE

## 2021-05-31 PROCEDURE — 86803 HEPATITIS C AB TEST: CPT | Performed by: INTERNAL MEDICINE

## 2021-05-31 PROCEDURE — 1126F PR PAIN SEVERITY QUANTIFIED, NO PAIN PRESENT: ICD-10-PCS | Mod: S$GLB,,, | Performed by: INTERNAL MEDICINE

## 2021-05-31 PROCEDURE — 84443 ASSAY THYROID STIM HORMONE: CPT | Performed by: INTERNAL MEDICINE

## 2021-05-31 PROCEDURE — 80053 COMPREHEN METABOLIC PANEL: CPT | Performed by: INTERNAL MEDICINE

## 2021-05-31 PROCEDURE — 3008F PR BODY MASS INDEX (BMI) DOCUMENTED: ICD-10-PCS | Mod: CPTII,S$GLB,, | Performed by: INTERNAL MEDICINE

## 2021-05-31 PROCEDURE — 80061 LIPID PANEL: CPT | Performed by: INTERNAL MEDICINE

## 2021-05-31 PROCEDURE — 99999 PR PBB SHADOW E&M-EST. PATIENT-LVL IV: ICD-10-PCS | Mod: PBBFAC,,, | Performed by: INTERNAL MEDICINE

## 2021-05-31 PROCEDURE — 36415 COLL VENOUS BLD VENIPUNCTURE: CPT | Performed by: INTERNAL MEDICINE

## 2021-05-31 PROCEDURE — 83036 HEMOGLOBIN GLYCOSYLATED A1C: CPT | Performed by: INTERNAL MEDICINE

## 2021-05-31 PROCEDURE — 3008F BODY MASS INDEX DOCD: CPT | Mod: CPTII,S$GLB,, | Performed by: INTERNAL MEDICINE

## 2021-05-31 PROCEDURE — 90715 TDAP VACCINE GREATER THAN OR EQUAL TO 7YO IM: ICD-10-PCS | Mod: S$GLB,,, | Performed by: INTERNAL MEDICINE

## 2021-05-31 PROCEDURE — 1126F AMNT PAIN NOTED NONE PRSNT: CPT | Mod: S$GLB,,, | Performed by: INTERNAL MEDICINE

## 2021-05-31 PROCEDURE — 90715 TDAP VACCINE 7 YRS/> IM: CPT | Mod: S$GLB,,, | Performed by: INTERNAL MEDICINE

## 2021-05-31 RX ORDER — ESCITALOPRAM OXALATE 5 MG/1
5 TABLET ORAL NIGHTLY
Qty: 30 TABLET | Refills: 2 | Status: SHIPPED | OUTPATIENT
Start: 2021-05-31 | End: 2021-09-06

## 2021-06-11 ENCOUNTER — OFFICE VISIT (OUTPATIENT)
Dept: DERMATOLOGY | Facility: CLINIC | Age: 41
End: 2021-06-11
Payer: COMMERCIAL

## 2021-06-11 DIAGNOSIS — D18.01 CHERRY ANGIOMA: ICD-10-CM

## 2021-06-11 DIAGNOSIS — D22.9 MULTIPLE BENIGN NEVI: ICD-10-CM

## 2021-06-11 DIAGNOSIS — Z12.83 SCREENING EXAM FOR SKIN CANCER: Primary | ICD-10-CM

## 2021-06-11 DIAGNOSIS — L82.1 SEBORRHEIC KERATOSES: ICD-10-CM

## 2021-06-11 DIAGNOSIS — Z12.83 SKIN CANCER SCREENING: ICD-10-CM

## 2021-06-11 PROCEDURE — 1126F AMNT PAIN NOTED NONE PRSNT: CPT | Mod: S$GLB,,, | Performed by: DERMATOLOGY

## 2021-06-11 PROCEDURE — 99203 OFFICE O/P NEW LOW 30 MIN: CPT | Mod: S$GLB,,, | Performed by: DERMATOLOGY

## 2021-06-11 PROCEDURE — 1126F PR PAIN SEVERITY QUANTIFIED, NO PAIN PRESENT: ICD-10-PCS | Mod: S$GLB,,, | Performed by: DERMATOLOGY

## 2021-06-11 PROCEDURE — 99999 PR PBB SHADOW E&M-EST. PATIENT-LVL II: ICD-10-PCS | Mod: PBBFAC,,, | Performed by: DERMATOLOGY

## 2021-06-11 PROCEDURE — 99203 PR OFFICE/OUTPT VISIT, NEW, LEVL III, 30-44 MIN: ICD-10-PCS | Mod: S$GLB,,, | Performed by: DERMATOLOGY

## 2021-06-11 PROCEDURE — 99999 PR PBB SHADOW E&M-EST. PATIENT-LVL II: CPT | Mod: PBBFAC,,, | Performed by: DERMATOLOGY

## 2021-07-21 ENCOUNTER — PATIENT MESSAGE (OUTPATIENT)
Dept: INTERNAL MEDICINE | Facility: CLINIC | Age: 41
End: 2021-07-21

## 2021-07-26 ENCOUNTER — OFFICE VISIT (OUTPATIENT)
Dept: URGENT CARE | Facility: CLINIC | Age: 41
End: 2021-07-26
Payer: COMMERCIAL

## 2021-07-26 VITALS
WEIGHT: 130 LBS | SYSTOLIC BLOOD PRESSURE: 99 MMHG | HEART RATE: 94 BPM | RESPIRATION RATE: 18 BRPM | TEMPERATURE: 100 F | BODY MASS INDEX: 20.89 KG/M2 | DIASTOLIC BLOOD PRESSURE: 63 MMHG | OXYGEN SATURATION: 96 % | HEIGHT: 66 IN

## 2021-07-26 DIAGNOSIS — R05.8 DRY COUGH: Primary | ICD-10-CM

## 2021-07-26 DIAGNOSIS — Z11.59 SCREENING FOR VIRAL DISEASE: ICD-10-CM

## 2021-07-26 DIAGNOSIS — Z71.89 EDUCATED ABOUT COVID-19 VIRUS INFECTION: ICD-10-CM

## 2021-07-26 LAB
CTP QC/QA: YES
SARS-COV-2 RDRP RESP QL NAA+PROBE: NEGATIVE

## 2021-07-26 PROCEDURE — 1159F PR MEDICATION LIST DOCUMENTED IN MEDICAL RECORD: ICD-10-PCS | Mod: CPTII,S$GLB,, | Performed by: PHYSICIAN ASSISTANT

## 2021-07-26 PROCEDURE — U0002 COVID-19 LAB TEST NON-CDC: HCPCS | Mod: QW,S$GLB,, | Performed by: PHYSICIAN ASSISTANT

## 2021-07-26 PROCEDURE — 99214 OFFICE O/P EST MOD 30 MIN: CPT | Mod: S$GLB,,, | Performed by: PHYSICIAN ASSISTANT

## 2021-07-26 PROCEDURE — 99214 PR OFFICE/OUTPT VISIT, EST, LEVL IV, 30-39 MIN: ICD-10-PCS | Mod: S$GLB,,, | Performed by: PHYSICIAN ASSISTANT

## 2021-07-26 PROCEDURE — 3008F BODY MASS INDEX DOCD: CPT | Mod: CPTII,S$GLB,, | Performed by: PHYSICIAN ASSISTANT

## 2021-07-26 PROCEDURE — 3008F PR BODY MASS INDEX (BMI) DOCUMENTED: ICD-10-PCS | Mod: CPTII,S$GLB,, | Performed by: PHYSICIAN ASSISTANT

## 2021-07-26 PROCEDURE — 1159F MED LIST DOCD IN RCRD: CPT | Mod: CPTII,S$GLB,, | Performed by: PHYSICIAN ASSISTANT

## 2021-07-26 PROCEDURE — U0002: ICD-10-PCS | Mod: QW,S$GLB,, | Performed by: PHYSICIAN ASSISTANT

## 2021-08-02 ENCOUNTER — PATIENT MESSAGE (OUTPATIENT)
Dept: INTERNAL MEDICINE | Facility: CLINIC | Age: 41
End: 2021-08-02

## 2021-08-05 ENCOUNTER — PATIENT MESSAGE (OUTPATIENT)
Dept: INTERNAL MEDICINE | Facility: CLINIC | Age: 41
End: 2021-08-05

## 2021-08-06 ENCOUNTER — PATIENT MESSAGE (OUTPATIENT)
Dept: INTERNAL MEDICINE | Facility: CLINIC | Age: 41
End: 2021-08-06

## 2021-08-07 ENCOUNTER — PATIENT MESSAGE (OUTPATIENT)
Dept: INTERNAL MEDICINE | Facility: CLINIC | Age: 41
End: 2021-08-07

## 2021-08-09 ENCOUNTER — PATIENT MESSAGE (OUTPATIENT)
Dept: OBSTETRICS AND GYNECOLOGY | Facility: CLINIC | Age: 41
End: 2021-08-09

## 2021-08-09 ENCOUNTER — PATIENT MESSAGE (OUTPATIENT)
Dept: INTERNAL MEDICINE | Facility: CLINIC | Age: 41
End: 2021-08-09

## 2021-08-11 ENCOUNTER — TELEPHONE (OUTPATIENT)
Dept: OBSTETRICS AND GYNECOLOGY | Facility: CLINIC | Age: 41
End: 2021-08-11

## 2021-08-22 ENCOUNTER — PATIENT MESSAGE (OUTPATIENT)
Dept: OBSTETRICS AND GYNECOLOGY | Facility: CLINIC | Age: 41
End: 2021-08-22

## 2021-08-22 DIAGNOSIS — E28.39 PREMATURE OVARIAN FAILURE: ICD-10-CM

## 2021-08-24 RX ORDER — ESTRADIOL 0.1 MG/D
1 FILM, EXTENDED RELEASE TRANSDERMAL
Qty: 8 PATCH | Refills: 6 | Status: SHIPPED | OUTPATIENT
Start: 2021-08-26 | End: 2022-01-03

## 2021-09-14 ENCOUNTER — PATIENT MESSAGE (OUTPATIENT)
Dept: OBSTETRICS AND GYNECOLOGY | Facility: CLINIC | Age: 41
End: 2021-09-14

## 2021-09-20 ENCOUNTER — PATIENT MESSAGE (OUTPATIENT)
Dept: INTERNAL MEDICINE | Facility: CLINIC | Age: 41
End: 2021-09-20

## 2021-09-21 ENCOUNTER — TELEPHONE (OUTPATIENT)
Dept: INTERNAL MEDICINE | Facility: CLINIC | Age: 41
End: 2021-09-21

## 2021-09-21 ENCOUNTER — PATIENT MESSAGE (OUTPATIENT)
Dept: INTERNAL MEDICINE | Facility: CLINIC | Age: 41
End: 2021-09-21

## 2021-09-21 ENCOUNTER — OFFICE VISIT (OUTPATIENT)
Dept: INTERNAL MEDICINE | Facility: CLINIC | Age: 41
End: 2021-09-21
Payer: COMMERCIAL

## 2021-09-21 DIAGNOSIS — T30.0 BURN: Primary | ICD-10-CM

## 2021-09-21 PROCEDURE — 1159F MED LIST DOCD IN RCRD: CPT | Mod: CPTII,95,, | Performed by: INTERNAL MEDICINE

## 2021-09-21 PROCEDURE — 99213 PR OFFICE/OUTPT VISIT, EST, LEVL III, 20-29 MIN: ICD-10-PCS | Mod: 95,,, | Performed by: INTERNAL MEDICINE

## 2021-09-21 PROCEDURE — 3044F HG A1C LEVEL LT 7.0%: CPT | Mod: CPTII,95,, | Performed by: INTERNAL MEDICINE

## 2021-09-21 PROCEDURE — 1160F PR REVIEW ALL MEDS BY PRESCRIBER/CLIN PHARMACIST DOCUMENTED: ICD-10-PCS | Mod: CPTII,95,, | Performed by: INTERNAL MEDICINE

## 2021-09-21 PROCEDURE — 1159F PR MEDICATION LIST DOCUMENTED IN MEDICAL RECORD: ICD-10-PCS | Mod: CPTII,95,, | Performed by: INTERNAL MEDICINE

## 2021-09-21 PROCEDURE — 99213 OFFICE O/P EST LOW 20 MIN: CPT | Mod: 95,,, | Performed by: INTERNAL MEDICINE

## 2021-09-21 PROCEDURE — 3044F PR MOST RECENT HEMOGLOBIN A1C LEVEL <7.0%: ICD-10-PCS | Mod: CPTII,95,, | Performed by: INTERNAL MEDICINE

## 2021-09-21 PROCEDURE — 1160F RVW MEDS BY RX/DR IN RCRD: CPT | Mod: CPTII,95,, | Performed by: INTERNAL MEDICINE

## 2021-09-21 RX ORDER — CEPHALEXIN 500 MG/1
500 CAPSULE ORAL EVERY 12 HOURS
Qty: 14 CAPSULE | Refills: 0 | Status: SHIPPED | OUTPATIENT
Start: 2021-09-21 | End: 2021-09-28

## 2022-04-28 ENCOUNTER — PATIENT MESSAGE (OUTPATIENT)
Dept: OBSTETRICS AND GYNECOLOGY | Facility: CLINIC | Age: 42
End: 2022-04-28
Payer: COMMERCIAL

## 2022-04-28 DIAGNOSIS — E28.39 PREMATURE OVARIAN FAILURE: ICD-10-CM

## 2022-04-29 RX ORDER — ESTRADIOL 0.1 MG/D
1 FILM, EXTENDED RELEASE TRANSDERMAL
Qty: 8 PATCH | Refills: 0 | Status: SHIPPED | OUTPATIENT
Start: 2022-05-02 | End: 2022-06-01

## 2022-06-29 ENCOUNTER — OFFICE VISIT (OUTPATIENT)
Dept: URGENT CARE | Facility: CLINIC | Age: 42
End: 2022-06-29
Payer: COMMERCIAL

## 2022-06-29 VITALS
TEMPERATURE: 98 F | RESPIRATION RATE: 17 BRPM | BODY MASS INDEX: 21.69 KG/M2 | WEIGHT: 135 LBS | HEART RATE: 89 BPM | SYSTOLIC BLOOD PRESSURE: 92 MMHG | OXYGEN SATURATION: 98 % | HEIGHT: 66 IN | DIASTOLIC BLOOD PRESSURE: 62 MMHG

## 2022-06-29 DIAGNOSIS — U07.1 COVID-19: ICD-10-CM

## 2022-06-29 DIAGNOSIS — J02.9 SORE THROAT: Primary | ICD-10-CM

## 2022-06-29 LAB
CTP QC/QA: YES
SARS-COV-2 RDRP RESP QL NAA+PROBE: POSITIVE

## 2022-06-29 PROCEDURE — 3008F BODY MASS INDEX DOCD: CPT | Mod: CPTII,S$GLB,, | Performed by: FAMILY MEDICINE

## 2022-06-29 PROCEDURE — U0002 COVID-19 LAB TEST NON-CDC: HCPCS | Mod: QW,S$GLB,, | Performed by: FAMILY MEDICINE

## 2022-06-29 PROCEDURE — 1160F PR REVIEW ALL MEDS BY PRESCRIBER/CLIN PHARMACIST DOCUMENTED: ICD-10-PCS | Mod: CPTII,S$GLB,, | Performed by: FAMILY MEDICINE

## 2022-06-29 PROCEDURE — U0002: ICD-10-PCS | Mod: QW,S$GLB,, | Performed by: FAMILY MEDICINE

## 2022-06-29 PROCEDURE — 1159F MED LIST DOCD IN RCRD: CPT | Mod: CPTII,S$GLB,, | Performed by: FAMILY MEDICINE

## 2022-06-29 PROCEDURE — 99213 OFFICE O/P EST LOW 20 MIN: CPT | Mod: S$GLB,,, | Performed by: FAMILY MEDICINE

## 2022-06-29 PROCEDURE — 1160F RVW MEDS BY RX/DR IN RCRD: CPT | Mod: CPTII,S$GLB,, | Performed by: FAMILY MEDICINE

## 2022-06-29 PROCEDURE — 3074F SYST BP LT 130 MM HG: CPT | Mod: CPTII,S$GLB,, | Performed by: FAMILY MEDICINE

## 2022-06-29 PROCEDURE — 3074F PR MOST RECENT SYSTOLIC BLOOD PRESSURE < 130 MM HG: ICD-10-PCS | Mod: CPTII,S$GLB,, | Performed by: FAMILY MEDICINE

## 2022-06-29 PROCEDURE — 1159F PR MEDICATION LIST DOCUMENTED IN MEDICAL RECORD: ICD-10-PCS | Mod: CPTII,S$GLB,, | Performed by: FAMILY MEDICINE

## 2022-06-29 PROCEDURE — 3008F PR BODY MASS INDEX (BMI) DOCUMENTED: ICD-10-PCS | Mod: CPTII,S$GLB,, | Performed by: FAMILY MEDICINE

## 2022-06-29 PROCEDURE — 99213 PR OFFICE/OUTPT VISIT, EST, LEVL III, 20-29 MIN: ICD-10-PCS | Mod: S$GLB,,, | Performed by: FAMILY MEDICINE

## 2022-06-29 PROCEDURE — 3078F PR MOST RECENT DIASTOLIC BLOOD PRESSURE < 80 MM HG: ICD-10-PCS | Mod: CPTII,S$GLB,, | Performed by: FAMILY MEDICINE

## 2022-06-29 PROCEDURE — 3078F DIAST BP <80 MM HG: CPT | Mod: CPTII,S$GLB,, | Performed by: FAMILY MEDICINE

## 2022-06-29 NOTE — PROGRESS NOTES
"Subjective:       Patient ID: Ashli Michaels is a 42 y.o. female.    Vitals:  height is 5' 5.98" (1.676 m) and weight is 61.2 kg (135 lb). Her tympanic temperature is 97.8 °F (36.6 °C). Her blood pressure is 92/62 and her pulse is 89. Her respiration is 17 and oxygen saturation is 98%.     Chief Complaint: Sore Throat    42 y.o female presents today c/o sore throat, cough, headaches and fatigue that started yesterday.  Patient is vaccinated for Covid 19 and states exposure to Covid 19.  Patient denies chest pain and denies SOB. Denies any significant fever.       Sore Throat   This is a new problem. The current episode started yesterday. Neither side of throat is experiencing more pain than the other. There has been no fever. The pain is at a severity of 3/10. The pain is mild. Associated symptoms include congestion, coughing and headaches. Pertinent negatives include no hoarse voice, shortness of breath, stridor, swollen glands, trouble swallowing or vomiting. Treatments tried: Advil. The treatment provided mild relief.       HENT: Positive for congestion and sore throat. Negative for trouble swallowing.    Respiratory: Positive for cough. Negative for shortness of breath and stridor.    Gastrointestinal: Negative for vomiting.   Neurological: Positive for headaches.       Objective:      Physical Exam   Constitutional: She is oriented to person, place, and time. She appears well-developed. She is cooperative.  Non-toxic appearance. She does not appear ill. No distress.   HENT:   Head: Normocephalic and atraumatic.   Ears:   Right Ear: Hearing normal.   Left Ear: Hearing normal.   Nose: Nose normal. No mucosal edema, rhinorrhea or nasal deformity. No epistaxis. Right sinus exhibits no maxillary sinus tenderness and no frontal sinus tenderness. Left sinus exhibits no maxillary sinus tenderness and no frontal sinus tenderness.   Mouth/Throat: Uvula is midline, oropharynx is clear and moist and mucous membranes " are normal. No trismus in the jaw. Normal dentition. No uvula swelling.   Eyes: Conjunctivae and lids are normal. Right eye exhibits no discharge. Left eye exhibits no discharge. No scleral icterus.   Neck: Trachea normal and phonation normal. Neck supple.   Cardiovascular: Normal rate, regular rhythm, normal heart sounds and normal pulses.   No murmur heard.  Pulmonary/Chest: Effort normal and breath sounds normal. No stridor. No respiratory distress. She has no wheezes. She has no rhonchi. She has no rales.   Abdominal: Normal appearance and bowel sounds are normal. She exhibits no distension and no mass. Soft. There is no abdominal tenderness.   Musculoskeletal: Normal range of motion.         General: No deformity. Normal range of motion.      Cervical back: She exhibits no tenderness.   Lymphadenopathy:     She has no cervical adenopathy.   Neurological: She is alert and oriented to person, place, and time. She exhibits normal muscle tone. Coordination normal.   Skin: Skin is warm, dry, intact, not diaphoretic and not pale.   Psychiatric: Her speech is normal and behavior is normal. Judgment and thought content normal.   Nursing note and vitals reviewed.        Assessment:       1. Sore throat    2. COVID-19          Plan:     0 Counseled patient and answered questions in regards to COVID-19 testing and diagnosis. Quarantine precautions, home care with plenty of fluids and use of OTC meds discussed. Use of pulse oxymeter discussed. Advised the patient to inform the PCP for +ve COVID status. ER precautions discussed. Patient verbalized understanding.  Monoclonal infusion Tx discussed and patient agreed.  Sore throat  -     POCT COVID-19 Rapid Screening    COVID-19

## 2022-07-12 ENCOUNTER — OFFICE VISIT (OUTPATIENT)
Dept: OBSTETRICS AND GYNECOLOGY | Facility: CLINIC | Age: 42
End: 2022-07-12
Payer: COMMERCIAL

## 2022-07-12 VITALS
SYSTOLIC BLOOD PRESSURE: 126 MMHG | HEIGHT: 66 IN | BODY MASS INDEX: 23.88 KG/M2 | WEIGHT: 148.56 LBS | DIASTOLIC BLOOD PRESSURE: 74 MMHG

## 2022-07-12 DIAGNOSIS — T83.32XA INTRAUTERINE CONTRACEPTIVE DEVICE THREADS LOST, INITIAL ENCOUNTER: ICD-10-CM

## 2022-07-12 DIAGNOSIS — E01.0 THYROMEGALY: ICD-10-CM

## 2022-07-12 DIAGNOSIS — Z01.419 WELL WOMAN EXAM WITH ROUTINE GYNECOLOGICAL EXAM: Primary | ICD-10-CM

## 2022-07-12 DIAGNOSIS — Z12.31 ENCOUNTER FOR SCREENING MAMMOGRAM FOR MALIGNANT NEOPLASM OF BREAST: ICD-10-CM

## 2022-07-12 PROCEDURE — 3078F PR MOST RECENT DIASTOLIC BLOOD PRESSURE < 80 MM HG: ICD-10-PCS | Mod: CPTII,S$GLB,, | Performed by: OBSTETRICS & GYNECOLOGY

## 2022-07-12 PROCEDURE — 99999 PR PBB SHADOW E&M-EST. PATIENT-LVL III: ICD-10-PCS | Mod: PBBFAC,,, | Performed by: OBSTETRICS & GYNECOLOGY

## 2022-07-12 PROCEDURE — 99396 PREV VISIT EST AGE 40-64: CPT | Mod: S$GLB,,, | Performed by: OBSTETRICS & GYNECOLOGY

## 2022-07-12 PROCEDURE — 3074F SYST BP LT 130 MM HG: CPT | Mod: CPTII,S$GLB,, | Performed by: OBSTETRICS & GYNECOLOGY

## 2022-07-12 PROCEDURE — 3078F DIAST BP <80 MM HG: CPT | Mod: CPTII,S$GLB,, | Performed by: OBSTETRICS & GYNECOLOGY

## 2022-07-12 PROCEDURE — 99396 PR PREVENTIVE VISIT,EST,40-64: ICD-10-PCS | Mod: S$GLB,,, | Performed by: OBSTETRICS & GYNECOLOGY

## 2022-07-12 PROCEDURE — 3074F PR MOST RECENT SYSTOLIC BLOOD PRESSURE < 130 MM HG: ICD-10-PCS | Mod: CPTII,S$GLB,, | Performed by: OBSTETRICS & GYNECOLOGY

## 2022-07-12 PROCEDURE — 99999 PR PBB SHADOW E&M-EST. PATIENT-LVL III: CPT | Mod: PBBFAC,,, | Performed by: OBSTETRICS & GYNECOLOGY

## 2022-07-12 PROCEDURE — 1159F MED LIST DOCD IN RCRD: CPT | Mod: CPTII,S$GLB,, | Performed by: OBSTETRICS & GYNECOLOGY

## 2022-07-12 PROCEDURE — 3008F PR BODY MASS INDEX (BMI) DOCUMENTED: ICD-10-PCS | Mod: CPTII,S$GLB,, | Performed by: OBSTETRICS & GYNECOLOGY

## 2022-07-12 PROCEDURE — 1159F PR MEDICATION LIST DOCUMENTED IN MEDICAL RECORD: ICD-10-PCS | Mod: CPTII,S$GLB,, | Performed by: OBSTETRICS & GYNECOLOGY

## 2022-07-12 PROCEDURE — 3008F BODY MASS INDEX DOCD: CPT | Mod: CPTII,S$GLB,, | Performed by: OBSTETRICS & GYNECOLOGY

## 2022-07-12 NOTE — PROGRESS NOTES
History & Physical  Gynecology      SUBJECTIVE:     Chief Complaint: Well Woman       History of Present Illness:  Annual Exam-Premenopausal  Patient presents for annual exam. The patient has complaints today of weight gain- 15lb heavier than last year.  Reports less exercise since ACL tear two years ago. Menstrual cycles are absent- IUD and vivelle dot for POI.  The patient is sexually active. GYN screening history: last pap: approximate date 2019 and was normal. The patient participates in regular exercise: recent injury.  Smoking status:  no.  Last mammo: 2020.     Contraception: IUD    FH:  Breast cancer: neg  Colon cancer: mat grandmother  Ovarian cancer: neg    Review of patient's allergies indicates:   Allergen Reactions    Erythromycin Other (See Comments) and Rash     As a child; hives       Past Medical History:   Diagnosis Date    Premature ovarian failure      Past Surgical History:   Procedure Laterality Date    BONE GRAFT Right 2020    Procedure: BONE GRAFT- AUTOGRAFT;  Surgeon: IRLANDA Corral MD;  Location: [facility] OR;  Service: Orthopedics;  Laterality: Right;    RECONSTRUCTION OF LIGAMENT Right 2020    Procedure: RECONSTRUCTION, LIGAMENT, ANTERIOR CRUCIATE LIGAMENT;  Surgeon: IRLANDA Corral MD;  Location: [facility] OR;  Service: Orthopedics;  Laterality: Right;  Regional w/Catheter - Adductor    Pericapsular Injection: Clonidine/Epi/Ketorolac/Ropivacaine 30cc    REPAIR OF MENISCUS OF KNEE Right 2020    Procedure: REPAIR, MENISCUS, KNEE;  Surgeon: IRLANDA Corral MD;  Location: [facility] OR;  Service: Orthopedics;  Laterality: Right;  Regional w/Catheter - Adductor    Pericapsular Injection: Clonidine/Epi/Ketorolac/Ropivacaine 30cc     OB History        0    Para   0    Term   0       0    AB   0    Living   0       SAB   0    IAB   0    Ectopic   0    Multiple   0    Live Births                   Family History   Problem Relation Age of Onset    Colon cancer  Maternal Grandmother 67    Alzheimer's disease Mother 72        memory issues about 5 yrs ago    Cancer Father 45        prostate CA    Heart disease Father 76        CAD and afib    No Known Problems Sister     No Known Problems Brother     Hypertension Neg Hx      labor Neg Hx     Breast cancer Neg Hx     Ovarian cancer Neg Hx      Social History     Tobacco Use    Smoking status: Never Smoker    Smokeless tobacco: Never Used   Substance Use Topics    Alcohol use: Yes     Comment: rarely    Drug use: No       Current Outpatient Medications   Medication Sig    estradioL (VIVELLE-DOT) 0.1 mg/24 hr PTSW APPLY 1 PATCH TOPICALLY TO THE SKIN 2 TIMES A WEEK    EScitalopram oxalate (LEXAPRO) 5 MG Tab TAKE 1 TABLET(5 MG) BY MOUTH EVERY NIGHT (Patient not taking: No sig reported)     Current Facility-Administered Medications   Medication    levonorgestrel 20 mcg/24 hr (5 years) IUD 1 Intra Uterine Device         Review of Systems:  Review of Systems   Constitutional: Negative for activity change, appetite change and fever.   Respiratory: Negative for shortness of breath.    Cardiovascular: Negative for chest pain.   Gastrointestinal: Negative for abdominal pain, constipation, diarrhea, nausea and vomiting.   Genitourinary: Negative for menorrhagia, menstrual problem, pelvic pain, vaginal bleeding, vaginal discharge and vaginal pain.   Integumentary:  Negative for breast mass.   Breast: Negative for mass and mastodynia       OBJECTIVE:     Physical Exam:  Physical Exam  Vitals and nursing note reviewed.   Constitutional:       Appearance: She is well-developed.   Neck:      Thyroid: Thyromegaly present.      Trachea: No tracheal deviation.   Cardiovascular:      Rate and Rhythm: Normal rate and regular rhythm.      Heart sounds: Normal heart sounds.   Pulmonary:      Effort: Pulmonary effort is normal.      Breath sounds: Normal breath sounds.   Chest:   Breasts: Breasts are symmetrical.      Right: No  inverted nipple, mass, nipple discharge, skin change or tenderness.      Left: No inverted nipple, mass, nipple discharge, skin change or tenderness.       Abdominal:      Palpations: Abdomen is soft.   Genitourinary:     General: Normal vulva.      Labia:         Right: No rash, tenderness, lesion or injury.         Left: No rash, tenderness, lesion or injury.       Urethra: No prolapse, urethral pain, urethral swelling or urethral lesion.      Vagina: Normal. No signs of injury and foreign body. No vaginal discharge, erythema, tenderness or bleeding.      Cervix: No cervical motion tenderness, discharge or friability.      Uterus: Not deviated, not enlarged, not fixed and not tender.       Adnexa:         Right: No mass, tenderness or fullness.          Left: No mass, tenderness or fullness.        Rectum: No anal fissure or external hemorrhoid.      Comments: Urethral meatus: normal size, anterior vaginal wall with no prolapse, no lesions  Bladder: no fullness, masses or tenderness  No strings present on exam  Musculoskeletal:      Cervical back: Normal range of motion and neck supple. No rigidity or tenderness.   Neurological:      Mental Status: She is alert and oriented to person, place, and time.   Psychiatric:         Behavior: Behavior normal.         Thought Content: Thought content normal.         Judgment: Judgment normal.         Chaperoned by: Olive    ASSESSMENT:       ICD-10-CM ICD-9-CM    1. Well woman exam with routine gynecological exam  Z01.419 V72.31    2. Encounter for screening mammogram for malignant neoplasm of breast  Z12.31 V76.12 Mammo Digital Screening Bilat w/ Bernabe   3. Thyromegaly  E01.0 240.9 US Thyroid   4. Intrauterine contraceptive device threads lost, initial encounter  T83.32XA 996.32 US Pelvis Limited_IUD Placement or Check          Plan:      Ashli was seen today for well woman.    Diagnoses and all orders for this visit:    Well woman exam with routine gynecological  exam    Encounter for screening mammogram for malignant neoplasm of breast  -     Mammo Digital Screening Bilat w/ Bernabe; Future    Thyromegaly  -     US Thyroid; Future    Intrauterine contraceptive device threads lost, initial encounter  -     US Pelvis Limited_IUD Placement or Check; Future        Orders Placed This Encounter   Procedures    Mammo Digital Screening Bilat w/ Bernabe    US Pelvis Limited_IUD Placement or Check    US Thyroid       Well Woman:  - Pap smear up to date  - Birth control: IUD- strings not visible; u/s ordered to check  - GC/CT:n/a  - Mammogram: ordered  - Smoking cessation: n/a  - Labs: none required - recent TSH normal  - Vaccines: none required  - Exercise counseling  - U/S of thyroid ordered given enlargement today      Follow up in one year for annual or prn.    Shanon Romo

## 2022-07-19 ENCOUNTER — HOSPITAL ENCOUNTER (OUTPATIENT)
Dept: RADIOLOGY | Facility: OTHER | Age: 42
Discharge: HOME | End: 2022-07-19
Attending: OBSTETRICS & GYNECOLOGY
Payer: COMMERCIAL

## 2022-07-19 DIAGNOSIS — Z12.31 ENCOUNTER FOR SCREENING MAMMOGRAM FOR MALIGNANT NEOPLASM OF BREAST: ICD-10-CM

## 2022-07-19 DIAGNOSIS — T83.32XA INTRAUTERINE CONTRACEPTIVE DEVICE THREADS LOST, INITIAL ENCOUNTER: ICD-10-CM

## 2022-07-19 DIAGNOSIS — E01.0 THYROMEGALY: ICD-10-CM

## 2022-07-19 PROCEDURE — 76856 US EXAM PELVIC COMPLETE: CPT | Mod: 26,,, | Performed by: RADIOLOGY

## 2022-07-19 PROCEDURE — 76856 US PELVIS COMP WITH TRANSVAG NON-OB (XPD): ICD-10-PCS | Mod: 26,,, | Performed by: RADIOLOGY

## 2022-07-19 PROCEDURE — 77067 SCR MAMMO BI INCL CAD: CPT | Mod: 26,,, | Performed by: RADIOLOGY

## 2022-07-19 PROCEDURE — 77063 BREAST TOMOSYNTHESIS BI: CPT | Mod: TC

## 2022-07-19 PROCEDURE — 76830 TRANSVAGINAL US NON-OB: CPT | Mod: 26,,, | Performed by: RADIOLOGY

## 2022-07-19 PROCEDURE — 76830 TRANSVAGINAL US NON-OB: CPT | Mod: TC

## 2022-07-19 PROCEDURE — 77067 MAMMO DIGITAL SCREENING BILAT WITH TOMO: ICD-10-PCS | Mod: 26,,, | Performed by: RADIOLOGY

## 2022-07-19 PROCEDURE — 77067 SCR MAMMO BI INCL CAD: CPT | Mod: TC

## 2022-07-19 PROCEDURE — 77063 MAMMO DIGITAL SCREENING BILAT WITH TOMO: ICD-10-PCS | Mod: 26,,, | Performed by: RADIOLOGY

## 2022-07-19 PROCEDURE — 77063 BREAST TOMOSYNTHESIS BI: CPT | Mod: 26,,, | Performed by: RADIOLOGY

## 2022-07-19 PROCEDURE — 76536 US EXAM OF HEAD AND NECK: CPT | Mod: 26,,, | Performed by: RADIOLOGY

## 2022-07-19 PROCEDURE — 76830 US PELVIS COMP WITH TRANSVAG NON-OB (XPD): ICD-10-PCS | Mod: 26,,, | Performed by: RADIOLOGY

## 2022-07-19 PROCEDURE — 76536 US EXAM OF HEAD AND NECK: CPT | Mod: TC

## 2022-07-19 PROCEDURE — 76536 US THYROID: ICD-10-PCS | Mod: 26,,, | Performed by: RADIOLOGY

## 2022-07-20 ENCOUNTER — PATIENT MESSAGE (OUTPATIENT)
Dept: OBSTETRICS AND GYNECOLOGY | Facility: CLINIC | Age: 42
End: 2022-07-20
Payer: COMMERCIAL

## 2022-07-26 ENCOUNTER — PATIENT MESSAGE (OUTPATIENT)
Dept: OBSTETRICS AND GYNECOLOGY | Facility: CLINIC | Age: 42
End: 2022-07-26
Payer: COMMERCIAL

## 2022-12-23 NOTE — PROGRESS NOTES
.    Physical Therapy Progress Note     Name: Ashli Michaels  Clinic Number: [id number]    Therapy Diagnosis:   Encounter Diagnoses   Name Primary?    Acute pain of right knee     Abnormal gait     Atrophy of muscle of right thigh      Physician: Farhad Hess PA*    Visit Date: 9/8/2020     Patient will weight bear as tolerates with the brace locked in extension. Range of motion may be full. Plan to follow an ACL allograft rehab protocol. Initial goals include maintenance of full knee extension, regaining flexion, swelling control, and quadriceps activation exercises.  May wean the brace and crutches as soon as she demonstrates good quadriceps control over the knee.  Expected release for sports no earlier than 12 months following [place] sport cord testing for the allograft. DVT prophylaxis with ASA 81 mg twice daily x 2 weeks.      Medical Diagnosis from Referral:   Evaluation Date: 5/27/2020 S83.511A (ICD-10-CM) - Rupture of anterior cruciate ligament of right knee, initial encounter  Authorization Period Expiration: 12-31-20  Plan of Care Expiration: 11-30-20  Visit # / Visits authorized:15/ 20    Time In: 0830  Time Out: 0930  Total Billable Time: 60 minutes    Precautions: Standard     Sugery 5/25/20: 14 weeks  days post op    Subjective       Pt reports: no pain today. States that she did have some slight pain after last tx that went away in a day. Sore from playing with daughter.    She was compliant with home exercise program.  Response to previous treatment: improved knee flexion  Functional change: improved ROM    Pain: 0/10  Location: right knee      Objective     ROM  L knee 7-0-150, flexion taken in prone  R knee 7-0-150, flexion taken in prone    Gait: independent, non antalgic, good knee extension at heel strike and mid stance    Ashli Eng received therapeutic exercises to develop strength, endurance, ROM and flexibility for 10 minutes including:  Elliptical 5 mins  H/s curls  maroon 3 x 15-np  Leg press single leg 3 x 15 60 lbs 90 degrees- np  Hammer strength 3 x 10 10 lbs knee extension-np  Hammer strength 3 x 10 5 lbs knee flexion-np  QS to SLR with heel prop 2 x 10    Biodex training 180deg/sec 5 x 10 each leg          Ashli Eng received Dynamic Activities for strength, endurance, ROM, Function 45 mins including  Walking quad stretch  Walking knee pulls  Runner calf stretch  Lateral shuffle   carioca  Backwards lunge  Lateral over under    Knee cross body stretch  Standing couch stretch    Dumbbell deadlift tempo 3/3/3/0 40 lbs  Single leg squat 3 x 10    Shuttle single leg jump to double landing 3 x 10 1 lower band    Monster walk maroon at knees 2 laps-np    Time includes education in hep and review    Ashli Eng received cold pack for0 minutes to R knee.      Home Exercises Provided and Patient Education Provided     Education provided:   - updated hep    Written Home Exercises Provided: yes.  Exercises were reviewed and Ashli Eng was able to demonstrate them prior to the end of the session.  Ashli Eng demonstrated good  understanding of the education provided.     See EMR under Patient Instructions for exercises provided 5/29/2020.    Assessment     No increase in pain with exercises. Progressing well. biodex test at week 16.  Ashli Eng is progressing well towards her goals.   Pt prognosis is Excellent.     Pt will continue to benefit from skilled outpatient physical therapy to address the deficits listed in the problem list box on initial evaluation, provide pt/family education and to maximize pt's level of independence in the home and community environment.     Pt's spiritual, cultural and educational needs considered and pt agreeable to plan of care and goals.     Anticipated barriers to physical therapy: none    Goals:   Short Term Goals: 10 weeks   1. Pt independent in initial hep- met  2. Decreased pain 0-3/10 with activities- met  3. ROM 5-0-135 or  better-met  4. Pt amb independently without significant deviations- progressing  5. Pt strength  4/5 or better throughout RLE- progressing  6. Pt able to perform proper body weight squat without significant deviation- progressing  7. No appreciable swelling- not met     Long Term Goals: 24 weeks   1. Pt independent in d/c hep  2. Decreased pain 0/10  3. ROM 5-0-150 or better  4. Biodex strength test 85% or better  5. Pt able to perform straight line jogging without significant deviations.    Plan     Continue to work on full AROM, reduction in swelling, isolated and compound strengthening. Will biodex at the 12 week stefanie for baseline strength measurements as long as there are not complications. Will start jogging progressions once criteria has been met ( full knee ext with quad control, single leg bounce 30 seconds or greater without deviation, biodex strength 75% of well leg, MMT hip abd 4/5 or better).    Willy Novak, PT    Gallo, significant other

## 2023-01-03 ENCOUNTER — PATIENT MESSAGE (OUTPATIENT)
Dept: RESEARCH | Facility: HOSPITAL | Age: 43
End: 2023-01-03
Payer: COMMERCIAL

## 2023-05-12 ENCOUNTER — PATIENT MESSAGE (OUTPATIENT)
Dept: RESEARCH | Facility: HOSPITAL | Age: 43
End: 2023-05-12
Payer: COMMERCIAL

## 2023-06-13 NOTE — PROGRESS NOTES
ANNUAL VISIT NOTE     PRESENTING HISTORY     Reason for Visit:  Annual visit.    No chief complaint on file.      History of Present Illness & ROS: Ms. Ashli Michaels is a 43 y.o. female.  Annual  today.   No longer has a PCP.   Very pleasant lady.   She is in the process of trying to Adopt a child and request to have forms completed for the process.   She is not yet est'd with a new PCP, since Dr. Espinoza has left the Practice and interested in re-establishing with a new PCP at this time.   She is fasting for labs.   *Resides in [place], [place] but travels her once a week for work.     Review of Systems:  Eyes: denies visual changes at this time denies floaters   ENT: no nasal congestion or sore throat  Respiratory: no cough or shorness of breath  Cardiovascular: no chest pain or palpitations  Gastrointestinal: no nausea or vomiting, no abdominal pain or change in bowel habits  Genitourinary: no hematuria or dysuria; denies frequency  Hematologic/Lymphatic: no easy bruising or lymphadenopathy  Musculoskeletal: no arthralgias or myalgias  Neurological: no seizures or tremors  Endocrine: no heat or cold intolerance    PAST HISTORY:     Past Medical History:   Diagnosis Date    Premature ovarian failure        Past Surgical History:   Procedure Laterality Date    BONE GRAFT Right 5/25/2020    Procedure: BONE GRAFT- AUTOGRAFT;  Surgeon: IRLANDA Corral MD;  Location: [facility] OR;  Service: Orthopedics;  Laterality: Right;    RECONSTRUCTION OF LIGAMENT Right 5/25/2020    Procedure: RECONSTRUCTION, LIGAMENT, ANTERIOR CRUCIATE LIGAMENT;  Surgeon: IRLANDA Corral MD;  Location: [facility] OR;  Service: Orthopedics;  Laterality: Right;  Regional w/Catheter - Adductor    Pericapsular Injection: Clonidine/Epi/Ketorolac/Ropivacaine 30cc    REPAIR OF MENISCUS OF KNEE Right 5/25/2020    Procedure: REPAIR, MENISCUS, KNEE;  Surgeon: IRLANDA Corral MD;  Location: [facility] OR;  Service: Orthopedics;  Laterality: Right;  Regional w/Catheter -  Adductor    Pericapsular Injection: Clonidine/Epi/Ketorolac/Ropivacaine 30cc       Family History   Problem Relation Age of Onset    Colon cancer Maternal Grandmother 67    Alzheimer's disease Mother 72        memory issues about 5 yrs ago    Cancer Father 45        prostate CA    Heart disease Father 76        CAD and afib    No Known Problems Sister     No Known Problems Brother     Hypertension Neg Hx      labor Neg Hx     Breast cancer Neg Hx     Ovarian cancer Neg Hx        Social History     Socioeconomic History    Marital status:    Tobacco Use    Smoking status: Never    Smokeless tobacco: Never   Substance and Sexual Activity    Alcohol use: Yes     Comment: rarely    Drug use: No    Sexual activity: Yes     Partners: Male     Birth control/protection: None   Social History Narrative    Ran all her life but had R ACL surgery last yr. Just started w/ short spurts of jogging and also biking. A little bit of soreness in the last mo.        MEDICATIONS & ALLERGIES:     Current Outpatient Medications on File Prior to Visit   Medication Sig Dispense Refill    EScitalopram oxalate (LEXAPRO) 5 MG Tab TAKE 1 TABLET(5 MG) BY MOUTH EVERY NIGHT (Patient not taking: No sig reported) 30 tablet 2    estradioL (VIVELLE-DOT) 0.1 mg/24 hr PTSW APPLY 1 PATCH TOPICALLY TO THE SKIN 2 TIMES A WEEK 8 patch 11     Current Facility-Administered Medications on File Prior to Visit   Medication Dose Route Frequency Provider Last Rate Last Admin    levonorgestrel 20 mcg/24 hr (5 years) IUD 1 Intra Uterine Device  1 Intra Uterine Device Intrauterine  Shanon Romo MD   1 Intra Uterine Device at 19 1109        Review of patient's allergies indicates:   Allergen Reactions    Erythromycin Other (See Comments) and Rash     As a child; hives       Medications Reconciliation:   I have reconciled the patient's home medications and discharge medications with the patient/family. I have updated all changes.  Refer to  After-Visit Medication List.    OBJECTIVE:     Vital Signs:  There were no vitals filed for this visit.  Wt Readings from Last 3 Encounters:   07/12/22 1028 67.4 kg (148 lb 9.4 oz)   06/29/22 0805 61.2 kg (135 lb)   07/26/21 1457 59 kg (130 lb)     There is no height or weight on file to calculate BMI.   Wt Readings from Last 3 Encounters:   06/14/23 68 kg (149 lb 14.6 oz)   07/12/22 67.4 kg (148 lb 9.4 oz)   06/29/22 61.2 kg (135 lb)     Temp Readings from Last 3 Encounters:   06/29/22 97.8 °F (36.6 °C) (Tympanic)   07/26/21 99.5 °F (37.5 °C) (Oral)   10/13/20 98.7 °F (37.1 °C)     BP Readings from Last 3 Encounters:   06/14/23 104/62   07/12/22 126/74   06/29/22 92/62     Pulse Readings from Last 3 Encounters:   06/14/23 64   06/29/22 89   07/26/21 94     Physical Exam:  General: Well developed, well nourished. No distress.  HEENT: Head is normocephalic, atraumatic; ears are normal with appearance of scaring to TM  Eyes: Clear conjunctiva.  Neck: Supple, symmetrical neck; trachea midline.  Lungs: Clear to auscultation bilaterally and normal respiratory effort.  Cardiovascular: Heart with regular rate and rhythm. No murmurs, gallops or rubs  Extremities: No LE edema. Pulses 2+ and symmetric.   Skin: Skin color, texture, turgor normal. No rashes.  Musculoskeletal: Normal gait.  Neurologic: Normal strength and tone. No focal numbness or weakness.   Psychiatric: Not depressed.    Laboratory  Lab Results   Component Value Date    WBC 6.98 05/31/2021    HGB 13.1 05/31/2021    HCT 40.8 05/31/2021     05/31/2021    CHOL 198 05/31/2021    TRIG 43 05/31/2021    HDL 61 05/31/2021    ALT 12 05/31/2021    AST 18 05/31/2021     05/31/2021    K 4.4 05/31/2021     05/31/2021    CREATININE 0.8 05/31/2021    BUN 11 05/31/2021    CO2 25 05/31/2021    TSH 2.538 05/31/2021    HGBA1C 5.0 05/31/2021     ASSESSMENT & PLAN:     Annual  Last 2021    Annual physical exam today and will RTC to Establish care with new  PCP  -     Comprehensive Metabolic Panel; Future; Expected date: 06/14/2023  -     CBC Auto Differential; Future; Expected date: 06/14/2023  -     Lipid Panel; Future; Expected date: 06/14/2023  -     Hemoglobin A1C; Future; Expected date: 06/14/2023  -     TSH; Future; Expected date: 06/14/2023  -     Vitamin D; Future; Expected date: 06/14/2023  -     Mammo Digital Screening Bilat w/ Bernabe; Future; Expected date: 06/14/2023  -     HIV 1/2 Ag/Ab (4th Gen); Future; Expected date: 06/14/2023    Premature ovarian failure  Followed by GYN  ` Estradiol and will get her Mammogram   -     Mammo Digital Screening Bilat w/ Bernabe; Future; Expected date: 06/14/2023      *Perform Annual PE today and will need to follow up with one of our  Physician Providers to be considered est'd in medical care with practice site and for completion of any needed forms.   She will message me when back in the [facility] area to get re-established with an MD accepting new patients.     Future Appointments   Date Time Provider Department Center   6/14/2023  8:30 AM LAB, APPOINTMENT [facility]  Patrick Seay PCW        Medication List            Accurate as of June 14, 2023  8:22 AM. If you have any questions, ask your nurse or doctor.                CONTINUE taking these medications      estradioL 0.1 mg/24 hr Ptsw  Commonly known as: VIVELLE-DOT  APPLY 1 PATCH TOPICALLY TO THE SKIN 2 TIMES A WEEK            STOP taking these medications      EScitalopram oxalate 5 MG Tab  Commonly known as: LEXAPRO  Stopped by: WYATT Tyler              Signing Physician:  WYATT Tyler

## 2023-06-14 ENCOUNTER — PATIENT MESSAGE (OUTPATIENT)
Dept: INTERNAL MEDICINE | Facility: CLINIC | Age: 43
End: 2023-06-14

## 2023-06-14 ENCOUNTER — LAB VISIT (OUTPATIENT)
Dept: LAB | Facility: HOSPITAL | Age: 43
End: 2023-06-14
Payer: COMMERCIAL

## 2023-06-14 ENCOUNTER — TELEPHONE (OUTPATIENT)
Dept: INTERNAL MEDICINE | Facility: CLINIC | Age: 43
End: 2023-06-14

## 2023-06-14 ENCOUNTER — OFFICE VISIT (OUTPATIENT)
Dept: INTERNAL MEDICINE | Facility: CLINIC | Age: 43
End: 2023-06-14
Payer: COMMERCIAL

## 2023-06-14 VITALS
SYSTOLIC BLOOD PRESSURE: 104 MMHG | HEIGHT: 66 IN | OXYGEN SATURATION: 99 % | DIASTOLIC BLOOD PRESSURE: 62 MMHG | WEIGHT: 149.94 LBS | HEART RATE: 64 BPM | BODY MASS INDEX: 24.1 KG/M2

## 2023-06-14 DIAGNOSIS — E28.39 PREMATURE OVARIAN FAILURE: ICD-10-CM

## 2023-06-14 DIAGNOSIS — Z00.00 ANNUAL PHYSICAL EXAM: ICD-10-CM

## 2023-06-14 DIAGNOSIS — Z00.00 ANNUAL PHYSICAL EXAM: Primary | ICD-10-CM

## 2023-06-14 LAB
25(OH)D3+25(OH)D2 SERPL-MCNC: 24 NG/ML (ref 30–96)
ALBUMIN SERPL BCP-MCNC: 3.8 G/DL (ref 3.5–5.2)
ALP SERPL-CCNC: 63 U/L (ref 55–135)
ALT SERPL W/O P-5'-P-CCNC: 17 U/L (ref 10–44)
ANION GAP SERPL CALC-SCNC: 8 MMOL/L (ref 8–16)
AST SERPL-CCNC: 18 U/L (ref 10–40)
BASOPHILS # BLD AUTO: 0.05 K/UL (ref 0–0.2)
BASOPHILS NFR BLD: 0.7 % (ref 0–1.9)
BILIRUB SERPL-MCNC: 0.3 MG/DL (ref 0.1–1)
BUN SERPL-MCNC: 10 MG/DL (ref 6–20)
CALCIUM SERPL-MCNC: 9.5 MG/DL (ref 8.7–10.5)
CHLORIDE SERPL-SCNC: 105 MMOL/L (ref 95–110)
CHOLEST SERPL-MCNC: 200 MG/DL (ref 120–199)
CHOLEST/HDLC SERPL: 3.6 {RATIO} (ref 2–5)
CO2 SERPL-SCNC: 25 MMOL/L (ref 23–29)
CREAT SERPL-MCNC: 0.7 MG/DL (ref 0.5–1.4)
DIFFERENTIAL METHOD: NORMAL
EOSINOPHIL # BLD AUTO: 0.1 K/UL (ref 0–0.5)
EOSINOPHIL NFR BLD: 1.6 % (ref 0–8)
ERYTHROCYTE [DISTWIDTH] IN BLOOD BY AUTOMATED COUNT: 12.3 % (ref 11.5–14.5)
EST. GFR  (NO RACE VARIABLE): >60 ML/MIN/1.73 M^2
ESTIMATED AVG GLUCOSE: 97 MG/DL (ref 68–131)
GLUCOSE SERPL-MCNC: 76 MG/DL (ref 70–110)
HBA1C MFR BLD: 5 % (ref 4–5.6)
HCT VFR BLD AUTO: 40.8 % (ref 37–48.5)
HDLC SERPL-MCNC: 55 MG/DL (ref 40–75)
HDLC SERPL: 27.5 % (ref 20–50)
HGB BLD-MCNC: 13.6 G/DL (ref 12–16)
HIV 1+2 AB+HIV1 P24 AG SERPL QL IA: NORMAL
IMM GRANULOCYTES # BLD AUTO: 0.02 K/UL (ref 0–0.04)
IMM GRANULOCYTES NFR BLD AUTO: 0.3 % (ref 0–0.5)
LDLC SERPL CALC-MCNC: 128 MG/DL (ref 63–159)
LYMPHOCYTES # BLD AUTO: 1.9 K/UL (ref 1–4.8)
LYMPHOCYTES NFR BLD: 25.4 % (ref 18–48)
MCH RBC QN AUTO: 30.9 PG (ref 27–31)
MCHC RBC AUTO-ENTMCNC: 33.3 G/DL (ref 32–36)
MCV RBC AUTO: 93 FL (ref 82–98)
MONOCYTES # BLD AUTO: 0.5 K/UL (ref 0.3–1)
MONOCYTES NFR BLD: 7 % (ref 4–15)
NEUTROPHILS # BLD AUTO: 4.8 K/UL (ref 1.8–7.7)
NEUTROPHILS NFR BLD: 65 % (ref 38–73)
NONHDLC SERPL-MCNC: 145 MG/DL
NRBC BLD-RTO: 0 /100 WBC
PLATELET # BLD AUTO: 307 K/UL (ref 150–450)
PMV BLD AUTO: 10.5 FL (ref 9.2–12.9)
POTASSIUM SERPL-SCNC: 4.2 MMOL/L (ref 3.5–5.1)
PROT SERPL-MCNC: 6.6 G/DL (ref 6–8.4)
RBC # BLD AUTO: 4.4 M/UL (ref 4–5.4)
SODIUM SERPL-SCNC: 138 MMOL/L (ref 136–145)
TRIGL SERPL-MCNC: 85 MG/DL (ref 30–150)
TSH SERPL DL<=0.005 MIU/L-ACNC: 2.27 UIU/ML (ref 0.4–4)
WBC # BLD AUTO: 7.33 K/UL (ref 3.9–12.7)

## 2023-06-14 PROCEDURE — 3074F PR MOST RECENT SYSTOLIC BLOOD PRESSURE < 130 MM HG: ICD-10-PCS | Mod: CPTII,S$GLB,, | Performed by: NURSE PRACTITIONER

## 2023-06-14 PROCEDURE — 36415 COLL VENOUS BLD VENIPUNCTURE: CPT | Performed by: NURSE PRACTITIONER

## 2023-06-14 PROCEDURE — 84443 ASSAY THYROID STIM HORMONE: CPT | Performed by: NURSE PRACTITIONER

## 2023-06-14 PROCEDURE — 3078F DIAST BP <80 MM HG: CPT | Mod: CPTII,S$GLB,, | Performed by: NURSE PRACTITIONER

## 2023-06-14 PROCEDURE — 83036 HEMOGLOBIN GLYCOSYLATED A1C: CPT | Performed by: NURSE PRACTITIONER

## 2023-06-14 PROCEDURE — 80053 COMPREHEN METABOLIC PANEL: CPT | Performed by: NURSE PRACTITIONER

## 2023-06-14 PROCEDURE — 85025 COMPLETE CBC W/AUTO DIFF WBC: CPT | Performed by: NURSE PRACTITIONER

## 2023-06-14 PROCEDURE — 99999 PR PBB SHADOW E&M-EST. PATIENT-LVL III: ICD-10-PCS | Mod: PBBFAC,,, | Performed by: NURSE PRACTITIONER

## 2023-06-14 PROCEDURE — 82306 VITAMIN D 25 HYDROXY: CPT | Performed by: NURSE PRACTITIONER

## 2023-06-14 PROCEDURE — 3008F PR BODY MASS INDEX (BMI) DOCUMENTED: ICD-10-PCS | Mod: CPTII,S$GLB,, | Performed by: NURSE PRACTITIONER

## 2023-06-14 PROCEDURE — 1160F PR REVIEW ALL MEDS BY PRESCRIBER/CLIN PHARMACIST DOCUMENTED: ICD-10-PCS | Mod: CPTII,S$GLB,, | Performed by: NURSE PRACTITIONER

## 2023-06-14 PROCEDURE — 3008F BODY MASS INDEX DOCD: CPT | Mod: CPTII,S$GLB,, | Performed by: NURSE PRACTITIONER

## 2023-06-14 PROCEDURE — 99999 PR PBB SHADOW E&M-EST. PATIENT-LVL III: CPT | Mod: PBBFAC,,, | Performed by: NURSE PRACTITIONER

## 2023-06-14 PROCEDURE — 1159F PR MEDICATION LIST DOCUMENTED IN MEDICAL RECORD: ICD-10-PCS | Mod: CPTII,S$GLB,, | Performed by: NURSE PRACTITIONER

## 2023-06-14 PROCEDURE — 99396 PREV VISIT EST AGE 40-64: CPT | Mod: S$GLB,,, | Performed by: NURSE PRACTITIONER

## 2023-06-14 PROCEDURE — 1159F MED LIST DOCD IN RCRD: CPT | Mod: CPTII,S$GLB,, | Performed by: NURSE PRACTITIONER

## 2023-06-14 PROCEDURE — 87389 HIV-1 AG W/HIV-1&-2 AB AG IA: CPT | Performed by: NURSE PRACTITIONER

## 2023-06-14 PROCEDURE — 3074F SYST BP LT 130 MM HG: CPT | Mod: CPTII,S$GLB,, | Performed by: NURSE PRACTITIONER

## 2023-06-14 PROCEDURE — 1160F RVW MEDS BY RX/DR IN RCRD: CPT | Mod: CPTII,S$GLB,, | Performed by: NURSE PRACTITIONER

## 2023-06-14 PROCEDURE — 3078F PR MOST RECENT DIASTOLIC BLOOD PRESSURE < 80 MM HG: ICD-10-PCS | Mod: CPTII,S$GLB,, | Performed by: NURSE PRACTITIONER

## 2023-06-14 PROCEDURE — 99396 PR PREVENTIVE VISIT,EST,40-64: ICD-10-PCS | Mod: S$GLB,,, | Performed by: NURSE PRACTITIONER

## 2023-06-14 PROCEDURE — 80061 LIPID PANEL: CPT | Performed by: NURSE PRACTITIONER

## 2023-06-14 RX ORDER — CHOLECALCIFEROL (VITAMIN D3) 50 MCG
1 TABLET ORAL DAILY
Qty: 90 TABLET | Refills: 0
Start: 2023-06-14

## 2023-06-15 ENCOUNTER — PATIENT MESSAGE (OUTPATIENT)
Dept: INTERNAL MEDICINE | Facility: CLINIC | Age: 43
End: 2023-06-15
Payer: COMMERCIAL

## 2023-06-30 ENCOUNTER — PATIENT MESSAGE (OUTPATIENT)
Dept: INTERNAL MEDICINE | Facility: CLINIC | Age: 43
End: 2023-06-30

## 2023-06-30 ENCOUNTER — OFFICE VISIT (OUTPATIENT)
Dept: INTERNAL MEDICINE | Facility: CLINIC | Age: 43
End: 2023-06-30
Payer: COMMERCIAL

## 2023-06-30 DIAGNOSIS — Z02.82 ENCOUNTER FOR ADOPTION REFERRAL: Primary | ICD-10-CM

## 2023-06-30 PROCEDURE — 3044F HG A1C LEVEL LT 7.0%: CPT | Mod: CPTII,95,, | Performed by: INTERNAL MEDICINE

## 2023-06-30 PROCEDURE — 1160F PR REVIEW ALL MEDS BY PRESCRIBER/CLIN PHARMACIST DOCUMENTED: ICD-10-PCS | Mod: CPTII,95,, | Performed by: INTERNAL MEDICINE

## 2023-06-30 PROCEDURE — 99213 PR OFFICE/OUTPT VISIT, EST, LEVL III, 20-29 MIN: ICD-10-PCS | Mod: 95,,, | Performed by: INTERNAL MEDICINE

## 2023-06-30 PROCEDURE — 1159F PR MEDICATION LIST DOCUMENTED IN MEDICAL RECORD: ICD-10-PCS | Mod: CPTII,95,, | Performed by: INTERNAL MEDICINE

## 2023-06-30 PROCEDURE — 3044F PR MOST RECENT HEMOGLOBIN A1C LEVEL <7.0%: ICD-10-PCS | Mod: CPTII,95,, | Performed by: INTERNAL MEDICINE

## 2023-06-30 PROCEDURE — 1159F MED LIST DOCD IN RCRD: CPT | Mod: CPTII,95,, | Performed by: INTERNAL MEDICINE

## 2023-06-30 PROCEDURE — 1160F RVW MEDS BY RX/DR IN RCRD: CPT | Mod: CPTII,95,, | Performed by: INTERNAL MEDICINE

## 2023-06-30 PROCEDURE — 99213 OFFICE O/P EST LOW 20 MIN: CPT | Mod: 95,,, | Performed by: INTERNAL MEDICINE

## 2023-06-30 NOTE — PROGRESS NOTES
Subjective:       Patient ID: Ashli Michaels is a 43 y.o. female.    Chief Complaint: No chief complaint on file.    The patient location is: home in the [facility]    The chief complaint leading to consultation is: adoption forms  Visit type: audiovisual  Total time spent with patient: 15    Each patient to whom he or she provides medical services by telemedicine is:  (1) informed of the relationship between the physician and patient and the respective role of any other health care provider with respect to management of the patient; and (2) notified that he or she may decline to receive medical services by telemedicine and may withdraw from such care at any time.    Notes:     Here to be evaluated for adoption with [place]. Dr Espinoza was her prev pcp. Patient saw Ms Bangurachanda Fletcher  2 weeks ago for full checkup.    Review of Systems   Constitutional:  Negative for activity change and unexpected weight change.   HENT:  Negative for hearing loss, rhinorrhea and trouble swallowing.    Eyes:  Negative for discharge and visual disturbance.   Respiratory:  Negative for chest tightness and wheezing.    Cardiovascular:  Negative for chest pain and palpitations.   Gastrointestinal:  Negative for blood in stool, constipation, diarrhea and vomiting.   Endocrine: Negative for polydipsia and polyuria.   Genitourinary:  Negative for difficulty urinating, dysuria, hematuria and menstrual problem.   Musculoskeletal:  Negative for arthralgias, joint swelling and neck pain.   Neurological:  Negative for weakness and headaches.   Psychiatric/Behavioral:  Negative for confusion and dysphoric mood.          Objective:      Physical Exam  Constitutional:       General: She is not in acute distress.     Appearance: Normal appearance. She is well-developed. She is not ill-appearing, toxic-appearing or diaphoretic.      Comments: Well appearing, breathing comfortably, speaking full sentences, no coughing during telemed  encounter     Pulmonary:      Effort: Pulmonary effort is normal. No respiratory distress.   Skin:     Findings: No rash.   Neurological:      Mental Status: She is alert and oriented to person, place, and time.   Psychiatric:         Behavior: Behavior normal.         Thought Content: Thought content normal.         Judgment: Judgment normal.       Assessment:       1. Encounter for adoption referral        Plan:       Diagnoses and all orders for this visit:    Encounter for adoption referral  I reviewed recent blood work results with the patient.  Patient in good medical and psychologic health to adopt, form completed        Health Maintenance         Date Due Completion Date    COVID-19 Vaccine (3 - Moderna series) 05/05/2021 3/10/2021    Mammogram 07/19/2023 7/19/2022    Influenza Vaccine (Season Ended) 09/01/2023 9/29/2019    Cervical Cancer Screening 01/17/2024 1/17/2019    TETANUS VACCINE 05/31/2031 5/31/2021            See back 1 year/prn prior    No future appointments.

## 2023-07-20 ENCOUNTER — OFFICE VISIT (OUTPATIENT)
Dept: INTERNAL MEDICINE | Facility: CLINIC | Age: 43
End: 2023-07-20
Payer: COMMERCIAL

## 2023-07-20 ENCOUNTER — PATIENT MESSAGE (OUTPATIENT)
Dept: INTERNAL MEDICINE | Facility: CLINIC | Age: 43
End: 2023-07-20

## 2023-07-20 DIAGNOSIS — U07.1 COVID: Primary | ICD-10-CM

## 2023-07-20 DIAGNOSIS — R05.1 ACUTE COUGH: ICD-10-CM

## 2023-07-20 DIAGNOSIS — R09.81 NASAL CONGESTION: ICD-10-CM

## 2023-07-20 PROCEDURE — 1160F RVW MEDS BY RX/DR IN RCRD: CPT | Mod: CPTII,95,, | Performed by: NURSE PRACTITIONER

## 2023-07-20 PROCEDURE — 3044F PR MOST RECENT HEMOGLOBIN A1C LEVEL <7.0%: ICD-10-PCS | Mod: CPTII,95,, | Performed by: NURSE PRACTITIONER

## 2023-07-20 PROCEDURE — 1159F MED LIST DOCD IN RCRD: CPT | Mod: CPTII,95,, | Performed by: NURSE PRACTITIONER

## 2023-07-20 PROCEDURE — 3044F HG A1C LEVEL LT 7.0%: CPT | Mod: CPTII,95,, | Performed by: NURSE PRACTITIONER

## 2023-07-20 PROCEDURE — 1159F PR MEDICATION LIST DOCUMENTED IN MEDICAL RECORD: ICD-10-PCS | Mod: CPTII,95,, | Performed by: NURSE PRACTITIONER

## 2023-07-20 PROCEDURE — 1160F PR REVIEW ALL MEDS BY PRESCRIBER/CLIN PHARMACIST DOCUMENTED: ICD-10-PCS | Mod: CPTII,95,, | Performed by: NURSE PRACTITIONER

## 2023-07-20 PROCEDURE — 99213 PR OFFICE/OUTPT VISIT, EST, LEVL III, 20-29 MIN: ICD-10-PCS | Mod: 95,,, | Performed by: NURSE PRACTITIONER

## 2023-07-20 PROCEDURE — 99213 OFFICE O/P EST LOW 20 MIN: CPT | Mod: 95,,, | Performed by: NURSE PRACTITIONER

## 2023-07-20 RX ORDER — PROMETHAZINE HYDROCHLORIDE AND DEXTROMETHORPHAN HYDROBROMIDE 6.25; 15 MG/5ML; MG/5ML
5 SYRUP ORAL EVERY 8 HOURS PRN
Qty: 105 ML | Refills: 0 | Status: SHIPPED | OUTPATIENT
Start: 2023-07-20 | End: 2023-07-27

## 2023-07-20 RX ORDER — BENZONATATE 100 MG/1
100 CAPSULE ORAL 3 TIMES DAILY PRN
Qty: 30 CAPSULE | Refills: 0 | Status: SHIPPED | OUTPATIENT
Start: 2023-07-20 | End: 2023-07-30

## 2023-07-20 RX ORDER — FLUTICASONE PROPIONATE 50 MCG
1 SPRAY, SUSPENSION (ML) NASAL DAILY
Qty: 16 G | Refills: 2 | Status: SHIPPED | OUTPATIENT
Start: 2023-07-20

## 2023-07-20 NOTE — PROGRESS NOTES
The patient location is: [place]   The chief complaint leading to consultation is: Tested positive for Covid    Visit type: audiovisual    Face to Face time with patient:   15 minutes of total time spent on the encounter, which includes face to face time and non-face to face time preparing to see the patient (eg, review of tests), Obtaining and/or reviewing separately obtained history, Documenting clinical information in the electronic or other health record, Independently interpreting results (not separately reported) and communicating results to the patient/family/caregiver, or Care coordination (not separately reported).       Each patient to whom he or she provides medical services by telemedicine is:  (1) informed of the relationship between the physician and patient and the respective role of any other health care provider with respect to management of the patient; and (2) notified that he or she may decline to receive medical services by telemedicine and may withdraw from such care at any time.    Notes:    Ashli is a very pleasant lady. Began to experience 'sneezing, a cough, and feeling really bad on yesterday; took a home COVID test (sent results to [facility] and are in the system, confirming she is Positive).   She is working from home, but also reports that 'work has been a bit heavier in duties and thought she was just tired and run down'. Reports that 'last night and this morning have been her worse, but feeling slightly better since this morning'.   She is candid with being conservative in treatment mgt. Does not endorse, chills, fever, SOB or chest wall pain. No GI symptoms.     A/P:   * reviewed    COVID Infection  Acute cough related to COVID Infection   Nasal congestion related to Acute Viral/Covid Syndrome:   -     benzonatate (TESSALON) 100 MG capsule; Take 1 capsule (100 mg total) by mouth 3 (three) times daily as needed for Cough.  Dispense: 30 capsule; Refill: 0  -      promethazine-dextromethorphan (PROMETHAZINE-DM) 6.25-15 mg/5 mL Syrp; Take 5 mLs by mouth every 8 (eight) hours as needed (Severe Cough).  Dispense: 105 mL; Refill: 0  -     fluticasone propionate (FLONASE) 50 mcg/actuation nasal spray; 1 spray (50 mcg total) by Each Nostril route once daily.  Dispense: 16 g; Refill: 2  *Declines EUA at this time and will keep us posted on her progress and / or if she should decide to start the EUA in the next 24 hours.                Medication List            Accurate as of July 20, 2023  1:15 PM. If you have any questions, ask your nurse or doctor.                START taking these medications      benzonatate 100 MG capsule  Commonly known as: TESSALON  Take 1 capsule (100 mg total) by mouth 3 (three) times daily as needed for Cough.  Started by: WYATT Tyler     fluticasone propionate 50 mcg/actuation nasal spray  Commonly known as: FLONASE  1 spray (50 mcg total) by Each Nostril route once daily.  Started by: WYATT Tyler     promethazine-dextromethorphan 6.25-15 mg/5 mL Syrp  Commonly known as: PROMETHAZINE-DM  Take 5 mLs by mouth every 8 (eight) hours as needed (Severe Cough).  Started by: WYATT Tyler            CONTINUE taking these medications      cholecalciferol (vitamin D3) 50 mcg (2,000 unit) Tab  Commonly known as: VITAMIN D3  Take 1 tablet (2,000 Units total) by mouth once daily.     estradioL 0.1 mg/24 hr Ptsw  Commonly known as: VIVELLE-DOT  APPLY 1 PATCH TOPICALLY TO THE SKIN 2 TIMES A WEEK               Where to Get Your Medications        These medications were sent to [id number] - [place], [place] - [address] AT [facility] [address]  [address] [place] [address] [place] [address]      Phone: [phone]   benzonatate 100 MG capsule  fluticasone propionate 50 mcg/actuation nasal spray  promethazine-dextromethorphan 6.25-15 mg/5 mL Syrp         S Narayan-Hector, MSN, APRN, FNP-BC

## 2023-08-21 ENCOUNTER — PATIENT MESSAGE (OUTPATIENT)
Dept: OBSTETRICS AND GYNECOLOGY | Facility: CLINIC | Age: 43
End: 2023-08-21
Payer: COMMERCIAL

## 2023-08-21 DIAGNOSIS — E28.39 PREMATURE OVARIAN FAILURE: ICD-10-CM

## 2023-08-22 RX ORDER — ESTRADIOL 0.1 MG/D
FILM, EXTENDED RELEASE TRANSDERMAL
Qty: 8 PATCH | Refills: 11 | Status: SHIPPED | OUTPATIENT
Start: 2023-08-22

## 2024-06-10 ENCOUNTER — PATIENT MESSAGE (OUTPATIENT)
Dept: INTERNAL MEDICINE | Facility: CLINIC | Age: 44
End: 2024-06-10
Payer: COMMERCIAL

## 2024-10-12 DIAGNOSIS — E28.39 PREMATURE OVARIAN FAILURE: ICD-10-CM

## 2024-10-12 NOTE — TELEPHONE ENCOUNTER
Refill Routing Note   Medication(s) are not appropriate for processing by [facility] for the following reason(s):        Patient not seen by provider within 15 months  Required labs outdated  Required vitals outdated    [facility] action(s):  Defer        Medication Therapy Plan: HRT patches now in scope. Mammo and vitals ; LOV >15mths      Appointments  past 12m or future 3m with PCP    Date Provider   Last Visit   2022 Shanon Romo MD   Next Visit   Visit date not found Shanon Romo MD   ED visits in past 90 days: 0        Note composed:8:13 AM 10/12/2024

## 2024-10-14 RX ORDER — ESTRADIOL 0.1 MG/D
FILM, EXTENDED RELEASE TRANSDERMAL
Qty: 8 PATCH | Refills: 0 | OUTPATIENT
Start: 2024-10-14

## 2024-10-14 NOTE — TELEPHONE ENCOUNTER
Provider Staff:  Please note Refusal of medication.     Action required for this patient.      Requested Prescriptions     Refused Prescriptions Disp Refills    estradioL (VIVELLE-DOT) 0.1 mg/24 hr PTSW [Pharmacy Med Name: ESTRADIOL 0.1MG PATCH (TWICE WK)] 8 patch 0     Sig: APPLY 1 PATCH TOPICALLY TO THE SKIN 2 TIMES A WEEK     Refused By: MORRO RICHARDSON     Reason for Refusal: Patient needs an appointment      Thanks!  [facility]   Note composed: 10/14/2024 10:10 AM

## (undated) DEVICE — CAUTERY BOVIE PENCIL

## (undated) DEVICE — SEE MEDLINE ITEM 146231

## (undated) DEVICE — ELECTRODE REM PLYHSV RETURN 9

## (undated) DEVICE — UNDERGLOVES BIOGEL PI SIZE 8.5

## (undated) DEVICE — NDL 18GA X1 1/2 REG BEVEL

## (undated) DEVICE — SEE MEDLINE ITEM 152530

## (undated) DEVICE — DRAPE STERI INSTRUMENT 1018

## (undated) DEVICE — SEE MEDLINE ITEM 157150

## (undated) DEVICE — SEE MEDLINE ITEM 157169

## (undated) DEVICE — NDL SAFETY 22G X 1.5 ECLIPSE

## (undated) DEVICE — PENCIL ROCKER SWITCH 10FT CORD

## (undated) DEVICE — CLOSURE SKIN STERI STRIP 1/2X4

## (undated) DEVICE — GLOVE BIOGEL SKINSENSE PI 8.0

## (undated) DEVICE — PAD COLD THERAPY KNEE WRAP ON

## (undated) DEVICE — GAUZE SPONGE 4X4 12PLY

## (undated) DEVICE — BLADE SHAVER 4.5 6/BX

## (undated) DEVICE — CUP MEDICINE STERILE 2OZ

## (undated) DEVICE — COVER CAMERA OPERATING ROOM

## (undated) DEVICE — SEE MEDLINE ITEM 152487

## (undated) DEVICE — PROBE ARTHSCP EDGE ENERGY 50

## (undated) DEVICE — PUMP COLD THERAPY

## (undated) DEVICE — APPLICATOR CHLORAPREP ORN 26ML

## (undated) DEVICE — Device

## (undated) DEVICE — SUT MONOCRYL 3-0 PS-1

## (undated) DEVICE — BANDAGE ACE ELASTIC 6"

## (undated) DEVICE — BLADE SHAVER LANZA 4.2X13CM

## (undated) DEVICE — COVER LIGHT HANDLE 80/CA

## (undated) DEVICE — SYR 30CC LUER LOCK

## (undated) DEVICE — SUT VICRYL 3-0 27 CT-1

## (undated) DEVICE — BRACE KNEE T SCOPE PREMIER

## (undated) DEVICE — SEE MEDLINE ITEM 157131

## (undated) DEVICE — CUTTER FLIPCUTTER II 10MM ST

## (undated) DEVICE — GOWN SMART IMP BREATHABLE XXLG

## (undated) DEVICE — PAD ABD 8X10 STERILE

## (undated) DEVICE — DRAPE EMERALD 87X114.75X113

## (undated) DEVICE — COVER MAYO STAND REINFRCD 30

## (undated) DEVICE — POSITIONER IV ARMBOARD FOAM

## (undated) DEVICE — SUT 0 VICRYL / CT-1

## (undated) DEVICE — PAD CAST SPECIALIST STRL 6

## (undated) DEVICE — NDL SPINAL 18GX3.5 SPINOCAN

## (undated) DEVICE — SOL IRR NACL .9% 3000ML

## (undated) DEVICE — SEE MEDLINE ITEM 146347

## (undated) DEVICE — TUBE SET INFLOW/OUTFLOW

## (undated) DEVICE — SEE MEDLINE ITEM 146313

## (undated) DEVICE — SCALPEL #15 BLADE STRL DISP.

## (undated) DEVICE — SUT FIBERWIRE 2 50IN BLUE

## (undated) DEVICE — DRESSING XEROFORM FOIL PK 1X8

## (undated) DEVICE — DRESSING XEROFORM 1X8IN

## (undated) DEVICE — TOURNIQUET SB QC DP 34X4IN